# Patient Record
Sex: FEMALE | Race: WHITE | NOT HISPANIC OR LATINO | URBAN - METROPOLITAN AREA
[De-identification: names, ages, dates, MRNs, and addresses within clinical notes are randomized per-mention and may not be internally consistent; named-entity substitution may affect disease eponyms.]

---

## 2017-01-25 ENCOUNTER — INPATIENT (INPATIENT)
Facility: HOSPITAL | Age: 82
LOS: 0 days | Discharge: HOME | End: 2017-01-26
Admitting: INTERNAL MEDICINE

## 2017-06-27 DIAGNOSIS — Z85.3 PERSONAL HISTORY OF MALIGNANT NEOPLASM OF BREAST: ICD-10-CM

## 2017-06-27 DIAGNOSIS — Z88.6 ALLERGY STATUS TO ANALGESIC AGENT: ICD-10-CM

## 2017-06-27 DIAGNOSIS — I95.1 ORTHOSTATIC HYPOTENSION: ICD-10-CM

## 2017-06-27 DIAGNOSIS — Z90.11 ACQUIRED ABSENCE OF RIGHT BREAST AND NIPPLE: ICD-10-CM

## 2017-06-27 DIAGNOSIS — Z86.73 PERSONAL HISTORY OF TRANSIENT ISCHEMIC ATTACK (TIA), AND CEREBRAL INFARCTION WITHOUT RESIDUAL DEFICITS: ICD-10-CM

## 2017-06-27 DIAGNOSIS — I65.22 OCCLUSION AND STENOSIS OF LEFT CAROTID ARTERY: ICD-10-CM

## 2017-06-27 DIAGNOSIS — E11.9 TYPE 2 DIABETES MELLITUS WITHOUT COMPLICATIONS: ICD-10-CM

## 2018-01-07 ENCOUNTER — INPATIENT (INPATIENT)
Facility: HOSPITAL | Age: 83
LOS: 1 days | Discharge: HOME | End: 2018-01-09
Attending: INTERNAL MEDICINE

## 2018-01-07 DIAGNOSIS — R42 DIZZINESS AND GIDDINESS: ICD-10-CM

## 2018-01-07 DIAGNOSIS — N39.0 URINARY TRACT INFECTION, SITE NOT SPECIFIED: ICD-10-CM

## 2018-01-07 DIAGNOSIS — E11.9 TYPE 2 DIABETES MELLITUS WITHOUT COMPLICATIONS: ICD-10-CM

## 2018-01-07 DIAGNOSIS — E78.00 PURE HYPERCHOLESTEROLEMIA, UNSPECIFIED: ICD-10-CM

## 2018-01-07 DIAGNOSIS — I10 ESSENTIAL (PRIMARY) HYPERTENSION: ICD-10-CM

## 2018-01-07 DIAGNOSIS — R55 SYNCOPE AND COLLAPSE: ICD-10-CM

## 2018-01-12 DIAGNOSIS — R42 DIZZINESS AND GIDDINESS: ICD-10-CM

## 2018-01-12 DIAGNOSIS — E11.9 TYPE 2 DIABETES MELLITUS WITHOUT COMPLICATIONS: ICD-10-CM

## 2018-01-12 DIAGNOSIS — S09.90XA UNSPECIFIED INJURY OF HEAD, INITIAL ENCOUNTER: ICD-10-CM
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
## 2018-01-12 DIAGNOSIS — I10 ESSENTIAL (PRIMARY) HYPERTENSION: ICD-10-CM

## 2018-01-12 DIAGNOSIS — H53.8 OTHER VISUAL DISTURBANCES: ICD-10-CM

## 2018-01-12 DIAGNOSIS — H91.8X1 OTHER SPECIFIED HEARING LOSS, RIGHT EAR: ICD-10-CM

## 2018-01-12 DIAGNOSIS — G45.0 VERTEBRO-BASILAR ARTERY SYNDROME: ICD-10-CM

## 2018-01-12 DIAGNOSIS — Z90.11 ACQUIRED ABSENCE OF RIGHT BREAST AND NIPPLE: ICD-10-CM

## 2018-01-12 DIAGNOSIS — E78.5 HYPERLIPIDEMIA, UNSPECIFIED: ICD-10-CM

## 2018-01-12 DIAGNOSIS — I69.398 OTHER SEQUELAE OF CEREBRAL INFARCTION: ICD-10-CM

## 2018-01-12 DIAGNOSIS — R26.81 UNSTEADINESS ON FEET: ICD-10-CM
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
## 2018-01-12 DIAGNOSIS — Z79.84 LONG TERM (CURRENT) USE OF ORAL HYPOGLYCEMIC DRUGS: ICD-10-CM

## 2018-08-30 ENCOUNTER — INPATIENT (INPATIENT)
Facility: HOSPITAL | Age: 83
LOS: 1 days | Discharge: HOME | End: 2018-09-01
Attending: INTERNAL MEDICINE | Admitting: INTERNAL MEDICINE

## 2018-08-30 VITALS
SYSTOLIC BLOOD PRESSURE: 165 MMHG | HEART RATE: 84 BPM | DIASTOLIC BLOOD PRESSURE: 77 MMHG | TEMPERATURE: 97 F | RESPIRATION RATE: 20 BRPM | OXYGEN SATURATION: 99 %

## 2018-08-30 PROBLEM — Z00.00 ENCOUNTER FOR PREVENTIVE HEALTH EXAMINATION: Status: ACTIVE | Noted: 2018-08-30

## 2018-08-30 LAB
ALBUMIN SERPL ELPH-MCNC: 4.4 G/DL — SIGNIFICANT CHANGE UP (ref 3.5–5.2)
ALP SERPL-CCNC: 78 U/L — SIGNIFICANT CHANGE UP (ref 30–115)
ALT FLD-CCNC: 17 U/L — SIGNIFICANT CHANGE UP (ref 0–41)
ANION GAP SERPL CALC-SCNC: 21 MMOL/L — HIGH (ref 7–14)
APPEARANCE UR: ABNORMAL
AST SERPL-CCNC: 27 U/L — SIGNIFICANT CHANGE UP (ref 0–41)
BASE EXCESS BLDV CALC-SCNC: 3.3 MMOL/L — HIGH (ref -2–2)
BASOPHILS # BLD AUTO: 0.04 K/UL — SIGNIFICANT CHANGE UP (ref 0–0.2)
BASOPHILS NFR BLD AUTO: 0.5 % — SIGNIFICANT CHANGE UP (ref 0–1)
BILIRUB SERPL-MCNC: 0.3 MG/DL — SIGNIFICANT CHANGE UP (ref 0.2–1.2)
BILIRUB UR-MCNC: NEGATIVE — SIGNIFICANT CHANGE UP
BUN SERPL-MCNC: 21 MG/DL — HIGH (ref 10–20)
CA-I SERPL-SCNC: 1.28 MMOL/L — SIGNIFICANT CHANGE UP (ref 1.12–1.3)
CALCIUM SERPL-MCNC: 9.5 MG/DL — SIGNIFICANT CHANGE UP (ref 8.5–10.1)
CHLORIDE SERPL-SCNC: 99 MMOL/L — SIGNIFICANT CHANGE UP (ref 98–110)
CO2 SERPL-SCNC: 20 MMOL/L — SIGNIFICANT CHANGE UP (ref 17–32)
COLOR SPEC: YELLOW — SIGNIFICANT CHANGE UP
CREAT SERPL-MCNC: 0.9 MG/DL — SIGNIFICANT CHANGE UP (ref 0.7–1.5)
DIFF PNL FLD: ABNORMAL
EOSINOPHIL # BLD AUTO: 0.06 K/UL — SIGNIFICANT CHANGE UP (ref 0–0.7)
EOSINOPHIL NFR BLD AUTO: 0.8 % — SIGNIFICANT CHANGE UP (ref 0–8)
GAS PNL BLDV: 140 MMOL/L — SIGNIFICANT CHANGE UP (ref 136–145)
GAS PNL BLDV: SIGNIFICANT CHANGE UP
GLUCOSE SERPL-MCNC: 162 MG/DL — HIGH (ref 70–99)
GLUCOSE UR QL: 250 MG/DL
HCO3 BLDV-SCNC: 30 MMOL/L — HIGH (ref 22–29)
HCT VFR BLD CALC: 35 % — LOW (ref 37–47)
HCT VFR BLDA CALC: 38.8 % — SIGNIFICANT CHANGE UP (ref 34–44)
HGB BLD CALC-MCNC: 12.7 G/DL — LOW (ref 14–18)
HGB BLD-MCNC: 11.7 G/DL — LOW (ref 12–16)
IMM GRANULOCYTES NFR BLD AUTO: 0.1 % — SIGNIFICANT CHANGE UP (ref 0.1–0.3)
KETONES UR-MCNC: NEGATIVE — SIGNIFICANT CHANGE UP
LACTATE BLDV-MCNC: 1.6 MMOL/L — SIGNIFICANT CHANGE UP (ref 0.5–1.6)
LEUKOCYTE ESTERASE UR-ACNC: ABNORMAL
LYMPHOCYTES # BLD AUTO: 1.44 K/UL — SIGNIFICANT CHANGE UP (ref 1.2–3.4)
LYMPHOCYTES # BLD AUTO: 18.5 % — LOW (ref 20.5–51.1)
MCHC RBC-ENTMCNC: 28 PG — SIGNIFICANT CHANGE UP (ref 27–31)
MCHC RBC-ENTMCNC: 33.4 G/DL — SIGNIFICANT CHANGE UP (ref 32–37)
MCV RBC AUTO: 83.7 FL — SIGNIFICANT CHANGE UP (ref 81–99)
MONOCYTES # BLD AUTO: 0.65 K/UL — HIGH (ref 0.1–0.6)
MONOCYTES NFR BLD AUTO: 8.3 % — SIGNIFICANT CHANGE UP (ref 1.7–9.3)
NEUTROPHILS # BLD AUTO: 5.59 K/UL — SIGNIFICANT CHANGE UP (ref 1.4–6.5)
NEUTROPHILS NFR BLD AUTO: 71.8 % — SIGNIFICANT CHANGE UP (ref 42.2–75.2)
NITRITE UR-MCNC: NEGATIVE — SIGNIFICANT CHANGE UP
NRBC # BLD: 0 /100 WBCS — SIGNIFICANT CHANGE UP (ref 0–0)
PCO2 BLDV: 51 MMHG — SIGNIFICANT CHANGE UP (ref 41–51)
PH BLDV: 7.37 — SIGNIFICANT CHANGE UP (ref 7.26–7.43)
PH UR: 6 — SIGNIFICANT CHANGE UP (ref 5–8)
PLATELET # BLD AUTO: 215 K/UL — SIGNIFICANT CHANGE UP (ref 130–400)
PO2 BLDV: 40 MMHG — SIGNIFICANT CHANGE UP (ref 20–40)
POTASSIUM BLDV-SCNC: 4.8 MMOL/L — SIGNIFICANT CHANGE UP (ref 3.3–5.6)
POTASSIUM SERPL-MCNC: 4.9 MMOL/L — SIGNIFICANT CHANGE UP (ref 3.5–5)
POTASSIUM SERPL-SCNC: 4.9 MMOL/L — SIGNIFICANT CHANGE UP (ref 3.5–5)
PROT SERPL-MCNC: 7.1 G/DL — SIGNIFICANT CHANGE UP (ref 6–8)
PROT UR-MCNC: 100 MG/DL
RBC # BLD: 4.18 M/UL — LOW (ref 4.2–5.4)
RBC # FLD: 13.3 % — SIGNIFICANT CHANGE UP (ref 11.5–14.5)
RBC CASTS # UR COMP ASSIST: ABNORMAL /HPF
SAO2 % BLDV: 72 % — SIGNIFICANT CHANGE UP
SODIUM SERPL-SCNC: 140 MMOL/L — SIGNIFICANT CHANGE UP (ref 135–146)
SP GR SPEC: 1.02 — SIGNIFICANT CHANGE UP (ref 1.01–1.03)
TROPONIN T SERPL-MCNC: <0.01 NG/ML — SIGNIFICANT CHANGE UP
UROBILINOGEN FLD QL: 0.2 MG/DL — SIGNIFICANT CHANGE UP (ref 0.2–0.2)
WBC # BLD: 7.79 K/UL — SIGNIFICANT CHANGE UP (ref 4.8–10.8)
WBC # FLD AUTO: 7.79 K/UL — SIGNIFICANT CHANGE UP (ref 4.8–10.8)
WBC UR QL: ABNORMAL /HPF

## 2018-08-30 RX ORDER — CEFTRIAXONE 500 MG/1
1 INJECTION, POWDER, FOR SOLUTION INTRAMUSCULAR; INTRAVENOUS EVERY 24 HOURS
Qty: 0 | Refills: 0 | Status: DISCONTINUED | OUTPATIENT
Start: 2018-08-30 | End: 2018-09-01

## 2018-08-30 RX ORDER — SODIUM CHLORIDE 9 MG/ML
500 INJECTION INTRAMUSCULAR; INTRAVENOUS; SUBCUTANEOUS ONCE
Qty: 0 | Refills: 0 | Status: COMPLETED | OUTPATIENT
Start: 2018-08-30 | End: 2018-08-30

## 2018-08-30 RX ORDER — MECLIZINE HCL 12.5 MG
25 TABLET ORAL ONCE
Qty: 0 | Refills: 0 | Status: DISCONTINUED | OUTPATIENT
Start: 2018-08-30 | End: 2018-08-30

## 2018-08-30 RX ADMIN — CEFTRIAXONE 1 GRAM(S): 500 INJECTION, POWDER, FOR SOLUTION INTRAMUSCULAR; INTRAVENOUS at 22:00

## 2018-08-30 RX ADMIN — SODIUM CHLORIDE 500 MILLILITER(S): 9 INJECTION INTRAMUSCULAR; INTRAVENOUS; SUBCUTANEOUS at 20:03

## 2018-08-30 RX ADMIN — CEFTRIAXONE 100 GRAM(S): 500 INJECTION, POWDER, FOR SOLUTION INTRAMUSCULAR; INTRAVENOUS at 21:30

## 2018-08-30 NOTE — ED PROVIDER NOTE - CARE PLAN
Principal Discharge DX:	Near syncope  Secondary Diagnosis:	Dizziness  Secondary Diagnosis:	UTI (urinary tract infection)

## 2018-08-30 NOTE — ED ADULT NURSE NOTE - OBJECTIVE STATEMENT
The patient complains of dizziness while standing for 1 week intermittently.  Patient states she was recently diagnosed with UTI by PCP. Patient denies chest pain and SOB.

## 2018-08-30 NOTE — ED PROVIDER NOTE - PHYSICAL EXAMINATION
PHYSICAL EXAM:    Constitutional: awake, alert, NAD  Eyes: EOMI, no conj injection  HENT: NC AT  Back: no c/t/l spine ttp  Respiratory: no respiratory distress, breath sounds equal b/l, no wheezing, rhonchi or stridor.   Cardiovascular: RRR nml S1S2  Gastrointestinal: soft, no masses, mild suprapubic tenderness. No guarding or rebound.   Extremities: no peripheral edema  Neurological: AAOx3, CN II-XII grossly intact, + LUE weakness 4/5 (old per pt), RUE/LLE/RLE str 5/5, sensation intact throughout.  HINTS  Skin: no rash  Musculoskeletal: no gross deformity

## 2018-08-30 NOTE — ED PROVIDER NOTE - NS ED ROS FT
Constutional: no fever or rigors  Eyes: no eye redness, acute visual change  ENMT: no ear pain, no throat pain  Card: no chest pain, no palpitations  Pulm: no cough, no shortness of breath  GI: suprapubic abdominal pain, no nausea or vomiting  : pos dysuria and hesitency  MSK: no limitation in range of motion, no neck pain  Skin: no rash, no abrasion  Neuro: no numbness, pos chronic weakness to LUE  Heme/Onc: no easy bruising, no bleeding tendency   Allergic: no hives, no throat swelling

## 2018-08-30 NOTE — ED PROVIDER NOTE - OBJECTIVE STATEMENT
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 pt saw pcp (Josefa) and was rx abx which could not be filled. pt feels presyncopal with standing. pt also c/o increasing vertigo. symptoms began 1 week ago when she felt dysuria and since then symptoms have continued to worsen.

## 2018-08-30 NOTE — ED PROVIDER NOTE - MEDICAL DECISION MAKING DETAILS
patient was signed out to me pending CT scans and Neuro evaluation. CT results noted, spoke with neurology, followed their recommendations, patient is admitted to Medicine in stable condition

## 2018-08-30 NOTE — ED PROVIDER NOTE - PROGRESS NOTE DETAILS
I signed out to Dr. Larsen: pending ct angio, neuro consult, admission for pyelo and stroke w/u Dr. Larson called ED back, as recommended Neuro NP is consulted, will evaluate patient

## 2018-08-31 DIAGNOSIS — Z90.10 ACQUIRED ABSENCE OF UNSPECIFIED BREAST AND NIPPLE: Chronic | ICD-10-CM

## 2018-08-31 LAB
ANION GAP SERPL CALC-SCNC: 16 MMOL/L — HIGH (ref 7–14)
BASOPHILS # BLD AUTO: 0.04 K/UL — SIGNIFICANT CHANGE UP (ref 0–0.2)
BASOPHILS NFR BLD AUTO: 0.7 % — SIGNIFICANT CHANGE UP (ref 0–1)
BUN SERPL-MCNC: 15 MG/DL — SIGNIFICANT CHANGE UP (ref 10–20)
CALCIUM SERPL-MCNC: 10.2 MG/DL — HIGH (ref 8.5–10.1)
CHLORIDE SERPL-SCNC: 97 MMOL/L — LOW (ref 98–110)
CK MB CFR SERPL CALC: 2.8 NG/ML — SIGNIFICANT CHANGE UP (ref 0.6–6.3)
CK SERPL-CCNC: 178 U/L — SIGNIFICANT CHANGE UP (ref 0–225)
CO2 SERPL-SCNC: 25 MMOL/L — SIGNIFICANT CHANGE UP (ref 17–32)
CREAT SERPL-MCNC: 0.8 MG/DL — SIGNIFICANT CHANGE UP (ref 0.7–1.5)
EOSINOPHIL # BLD AUTO: 0.05 K/UL — SIGNIFICANT CHANGE UP (ref 0–0.7)
EOSINOPHIL NFR BLD AUTO: 0.8 % — SIGNIFICANT CHANGE UP (ref 0–8)
GLUCOSE BLDC GLUCOMTR-MCNC: 240 MG/DL — HIGH (ref 70–99)
GLUCOSE SERPL-MCNC: 254 MG/DL — HIGH (ref 70–99)
HCT VFR BLD CALC: 35.3 % — LOW (ref 37–47)
HGB BLD-MCNC: 11.6 G/DL — LOW (ref 12–16)
IMM GRANULOCYTES NFR BLD AUTO: 0.2 % — SIGNIFICANT CHANGE UP (ref 0.1–0.3)
LYMPHOCYTES # BLD AUTO: 1.33 K/UL — SIGNIFICANT CHANGE UP (ref 1.2–3.4)
LYMPHOCYTES # BLD AUTO: 22.1 % — SIGNIFICANT CHANGE UP (ref 20.5–51.1)
MAGNESIUM SERPL-MCNC: 1.9 MG/DL — SIGNIFICANT CHANGE UP (ref 1.8–2.4)
MCHC RBC-ENTMCNC: 27.9 PG — SIGNIFICANT CHANGE UP (ref 27–31)
MCHC RBC-ENTMCNC: 32.9 G/DL — SIGNIFICANT CHANGE UP (ref 32–37)
MCV RBC AUTO: 84.9 FL — SIGNIFICANT CHANGE UP (ref 81–99)
MONOCYTES # BLD AUTO: 0.6 K/UL — SIGNIFICANT CHANGE UP (ref 0.1–0.6)
MONOCYTES NFR BLD AUTO: 10 % — HIGH (ref 1.7–9.3)
NEUTROPHILS # BLD AUTO: 3.99 K/UL — SIGNIFICANT CHANGE UP (ref 1.4–6.5)
NEUTROPHILS NFR BLD AUTO: 66.2 % — SIGNIFICANT CHANGE UP (ref 42.2–75.2)
NRBC # BLD: 0 /100 WBCS — SIGNIFICANT CHANGE UP (ref 0–0)
PLATELET # BLD AUTO: 182 K/UL — SIGNIFICANT CHANGE UP (ref 130–400)
POTASSIUM SERPL-MCNC: 5.5 MMOL/L — HIGH (ref 3.5–5)
POTASSIUM SERPL-SCNC: 5.5 MMOL/L — HIGH (ref 3.5–5)
RBC # BLD: 4.16 M/UL — LOW (ref 4.2–5.4)
RBC # FLD: 13.2 % — SIGNIFICANT CHANGE UP (ref 11.5–14.5)
SODIUM SERPL-SCNC: 138 MMOL/L — SIGNIFICANT CHANGE UP (ref 135–146)
TROPONIN T SERPL-MCNC: <0.01 NG/ML — SIGNIFICANT CHANGE UP
WBC # BLD: 6.02 K/UL — SIGNIFICANT CHANGE UP (ref 4.8–10.8)
WBC # FLD AUTO: 6.02 K/UL — SIGNIFICANT CHANGE UP (ref 4.8–10.8)

## 2018-08-31 RX ORDER — CHLORHEXIDINE GLUCONATE 213 G/1000ML
1 SOLUTION TOPICAL
Qty: 0 | Refills: 0 | Status: DISCONTINUED | OUTPATIENT
Start: 2018-08-31 | End: 2018-09-01

## 2018-08-31 RX ORDER — ACETAMINOPHEN 500 MG
975 TABLET ORAL ONCE
Qty: 0 | Refills: 0 | Status: COMPLETED | OUTPATIENT
Start: 2018-08-31 | End: 2018-08-31

## 2018-08-31 RX ORDER — ATORVASTATIN CALCIUM 80 MG/1
40 TABLET, FILM COATED ORAL AT BEDTIME
Qty: 0 | Refills: 0 | Status: DISCONTINUED | OUTPATIENT
Start: 2018-08-31 | End: 2018-09-01

## 2018-08-31 RX ORDER — ENOXAPARIN SODIUM 100 MG/ML
40 INJECTION SUBCUTANEOUS EVERY 24 HOURS
Qty: 0 | Refills: 0 | Status: DISCONTINUED | OUTPATIENT
Start: 2018-08-31 | End: 2018-09-01

## 2018-08-31 RX ORDER — CLOPIDOGREL BISULFATE 75 MG/1
75 TABLET, FILM COATED ORAL DAILY
Qty: 0 | Refills: 0 | Status: DISCONTINUED | OUTPATIENT
Start: 2018-08-31 | End: 2018-09-01

## 2018-08-31 RX ORDER — LISINOPRIL 2.5 MG/1
5 TABLET ORAL DAILY
Qty: 0 | Refills: 0 | Status: DISCONTINUED | OUTPATIENT
Start: 2018-08-31 | End: 2018-09-01

## 2018-08-31 RX ORDER — ASPIRIN/CALCIUM CARB/MAGNESIUM 324 MG
81 TABLET ORAL DAILY
Qty: 0 | Refills: 0 | Status: DISCONTINUED | OUTPATIENT
Start: 2018-08-31 | End: 2018-09-01

## 2018-08-31 RX ADMIN — Medication 975 MILLIGRAM(S): at 02:00

## 2018-08-31 RX ADMIN — LISINOPRIL 5 MILLIGRAM(S): 2.5 TABLET ORAL at 06:22

## 2018-08-31 RX ADMIN — Medication 81 MILLIGRAM(S): at 11:25

## 2018-08-31 RX ADMIN — ATORVASTATIN CALCIUM 40 MILLIGRAM(S): 80 TABLET, FILM COATED ORAL at 21:07

## 2018-08-31 RX ADMIN — ENOXAPARIN SODIUM 40 MILLIGRAM(S): 100 INJECTION SUBCUTANEOUS at 06:22

## 2018-08-31 RX ADMIN — CEFTRIAXONE 100 GRAM(S): 500 INJECTION, POWDER, FOR SOLUTION INTRAMUSCULAR; INTRAVENOUS at 21:06

## 2018-08-31 RX ADMIN — CLOPIDOGREL BISULFATE 75 MILLIGRAM(S): 75 TABLET, FILM COATED ORAL at 11:25

## 2018-08-31 RX ADMIN — CHLORHEXIDINE GLUCONATE 1 APPLICATION(S): 213 SOLUTION TOPICAL at 06:21

## 2018-08-31 NOTE — CHART NOTE - NSCHARTNOTEFT_GEN_A_CORE
spoke with pt- pt is followed by Dr. Jaeger and saw her last week.  Pt requesting Dr. Cabrera for formal neurologic evaluation.  REcommend contacting Dr. Cardona/[facility] service for neurologic evaluation.  Discussed with housestaff.

## 2018-08-31 NOTE — CONSULT NOTE ADULT - ASSESSMENT
Imp; 84 y/o F with chronic dizziness and left side weakness presents with new found UTI with dysuria and continued vertigo in the setting of UTI    Plan; Recommend MRI of brain / MRA too  No acute intervention at this time  Will continue to follow along    Case discussed with Dr SHETH Imp; 82 y/o F with chronic dizziness and left side weakness presents with new found UTI with dysuria and continued vertigo in the setting of UTI    Plan:  No acute intervention at this time  Will continue to follow along

## 2018-08-31 NOTE — H&P ADULT - ATTENDING COMMENTS
#dizziness  orthostatics neg  +horz nystagmus; ?central vertigo from prior cva  pending neuro recs  cont dapt, lipitor

## 2018-08-31 NOTE — CONSULT NOTE ADULT - SUBJECTIVE AND OBJECTIVE BOX
Neurology consult    VAN [id number]    HPI:  82 yo F with PMH of DM, HTN, chronic vertigo, multiple strokes in past w/ residual L-sided weakness presented to ED with worsening dizziness. Dizziness started ~1 week PTP. States it is more a lightheaded sensation than room spinning. occurs when standing up, looking at certain objects. had this for many months since her stroke 6 mos ago. Reports occasional associated headaches. Denies chest pain, palpitations, vision changes, nausea, vomiting, or any falls or LOC. States symptoms started at same time she began having urinary symptoms (burning sensation). Has had frequent UTI's in past. Was told by her PCP she has a UTI and was given prescription of Abx.       MEDICATIONS    aspirin  chewable 81 milliGRAM(s) Oral daily  atorvastatin 40 milliGRAM(s) Oral at bedtime  cefTRIAXone   IVPB 1 Gram(s) IV Intermittent every 24 hours  chlorhexidine 4% Liquid 1 Application(s) Topical <User Schedule>  clopidogrel Tablet 75 milliGRAM(s) Oral daily  enoxaparin Injectable 40 milliGRAM(s) SubCutaneous every 24 hours  lisinopril 5 milliGRAM(s) Oral daily         Family history: No history of dementia, strokes, or seizures   FAMILY HISTORY:  No pertinent family history in first degree relatives    SOCIAL HISTORY -- No history of tobacco or alcohol use     Allergies    codeine (Hives; Urticaria)    Intolerances        Height (cm): 157.48 ( @ 04:44)  Weight (kg): 53.2 ( @ 04:44)  BMI (kg/m2): 21.5 ( @ 04:44)    Vital Signs Last 24 Hrs  T(C): 36.3 (31 Aug 2018 13:35), Max: 37.6 (30 Aug 2018 22:58)  T(F): 97.3 (31 Aug 2018 13:35), Max: 99.6 (30 Aug 2018 22:58)  HR: 73 (31 Aug 2018 13:35) (52 - 84)  BP: 117/55 (31 Aug 2018 13:35) (117/55 - 165/77)  BP(mean): --  RR: 18 (31 Aug 2018 13:35) (18 - 20)  SpO2: 99% (30 Aug 2018 22:58) (99% - 99%)      PHYSICAL EXAM:  General:  NAD. Neck supple. No carotid bruit. No extremity edema.  Neurological:  Mental status: awake, alert, oriented x3  Speech: fluent, no dysarthria  CN: II-XII intact, no nystagmus, no facial droop  Motor: muscle tone normal, muslce power 5/5 in all R extremities proximally and distally, L-hemiparesis 4/5  DTRs: 2+ throughuot, plantar - toes downgoig on R, Upgoing on L  Sensory: intact to light touch and pinprick  Cerebellar: normal F/N and H/N b/l  Gait:  ataxic, hemiparetic      LABS:  CBC Full  -  ( 31 Aug 2018 12:24 )  WBC Count : 6.02 K/uL  Hemoglobin : 11.6 g/dL  Hematocrit : 35.3 %  Platelet Count - Automated : 182 K/uL  Mean Cell Volume : 84.9 fL  Mean Cell Hemoglobin : 27.9 pg  Mean Cell Hemoglobin Concentration : 32.9 g/dL  Auto Neutrophil # : 3.99 K/uL  Auto Lymphocyte # : 1.33 K/uL  Auto Monocyte # : 0.60 K/uL  Auto Eosinophil # : 0.05 K/uL  Auto Basophil # : 0.04 K/uL  Auto Neutrophil % : 66.2 %  Auto Lymphocyte % : 22.1 %  Auto Monocyte % : 10.0 %  Auto Eosinophil % : 0.8 %  Auto Basophil % : 0.7 %    Urinalysis Basic - ( 30 Aug 2018 19:03 )    Color: Yellow / Appearance: Cloudy / S.020 / pH: x  Gluc: x / Ketone: Negative  / Bili: Negative / Urobili: 0.2 mg/dL   Blood: x / Protein: 100 mg/dL / Nitrite: Negative   Leuk Esterase: Large / RBC: 25-50 /HPF / WBC 26-50 /HPF   Sq Epi: x / Non Sq Epi: x / Bacteria: x      -    138  |  97<L>  |  15  ----------------------------<  254<H>  5.5<H>   |  25  |  0.8    Ca    10.2<H>      31 Aug 2018 12:24  Mg     1.9         TPro  7.1  /  Alb  4.4  /  TBili  0.3  /  DBili  x   /  AST  27  /  ALT  17  /  AlkPhos  78      Hemoglobin A1C:     LIVER FUNCTIONS - ( 30 Aug 2018 19:03 )  Alb: 4.4 g/dL / Pro: 7.1 g/dL / ALK PHOS: 78 U/L / ALT: 17 U/L / AST: 27 U/L / GGT: x                 RADIOLOGY:  Head CT:  1.  Stable chronic infarct in the right occipital lobe.  2.  Stable chronic microvascular ischemic changes.  3.  No CT evidence of acute intracranial abnormality.    CTA Neck:  CTA neck: Mild atherosclerosis at the carotid bifurcations with mild   (less than 50%) focal stenosis.    CTA Head: pending        Impression:   82 yo F with PMH of DM, HTN, chronic vertigo, multiple strokes in past w/ residual L-sided weakness presented to ED with worsening dizziness most likely 2 to    -Peripheral vs Central Vestibulopathy.  -S/P Stroke with L-HP  -UTI  -H/o DM, HTN    Plan:  -Continue present management  -F/U CTA Head report  -Vestibular Tx as outpt  -F/U Neuro as outpt  -Neurologicaly stable  -Case was discussed with Dr. Hassan.

## 2018-08-31 NOTE — CONSULT NOTE ADULT - ASSESSMENT
IMPRESSION: Rehab of gait abnormality secondary to UTI    PRECAUTIONS: [ x ] Cardiac  [  ] Respiratory  [  ] Seizures [  ] Contact Isolation  [  ] Droplet Isolation  [  ] Other    Weight Bearing Status:     RECOMMENDATION:    Out of Bed to Chair     DVT/Decubiti Prophylaxis    REHAB PLAN:     [ xx  ] Bedside P/T 3-5 times a week   [   ]   Bedside O/T  2-3 times a week             [   ] No Rehab Therapy Indicated                   [   ]  Speech Therapy   Conditioning/ROM                                    ADL  Bed Mobility                                               Conditioning/ROM  Transfers                                                     Bed Mobility  Sitting /Standing Balance                         Transfers                                        Gait Training                                               Sitting/Standing Balance  Stair Training [   ]Applicable                    Home equipment Eval                                                                        Splinting  [   ] Only      GOALS:   ADL   [ x  ]   Independent                    Transfers  [ x  ] Independent                          Ambulation  [ x  ] Independent     [ x   ] With device                            [   ]  CG                                                         [   ]  CG                                                                  [   ] CG                            [    ] Min A                                                   [   ] Min A                                                              [   ] Min  A          DISCHARGE PLAN:   [   ]  Good candidate for Intensive Rehabilitation/Hospital based-[facility]                                             Will tolerate 3hrs Intensive Rehab Daily                                       [    ]  Short Term Rehab in Skilled Nursing Facility                                       [xx    ]  Home with Outpatient or VN services                                         [    ]  Possible Candidate for Intensive Hospital based Rehab

## 2018-08-31 NOTE — H&P ADULT - NSHPPHYSICALEXAM_GEN_ALL_CORE
GEN: NAD, comfortable, elderly  CARDIO: RRR, no m/r/g  RESP: CTAB, no w/r/r  ABD: soft, non-distended, +bs, mild tenderness to palpation lower > upper abdomen  EXT: no edema  NEURO: AAOx3, LUE + LLE strength 3-4/5, RUE + RLE 5/5, no facial asymmetry, moderate dysmetria noted LUE GEN: NAD, comfortable, elderly  CARDIO: RRR, no m/r/g  RESP: CTAB, no w/r/r  ABD: soft, non-distended, +bs, mild tenderness to palpation lower > upper abdomen  EXT: no edema  NEURO: AAOx3, LUE + LLE strength 3-4/5, RUE + RLE 5/5, moderate dysmetria noted LUE, no facial asymmetry GEN: NAD, comfortable, elderly  CARDIO: RRR, no m/r/g  RESP: CTAB, no w/r/r  ABD: soft, non-distended, +bs, mild tenderness to palpation lower > upper abdomen  EXT: no edema  NEURO: AAOx3, LUE + LLE strength 3-4/5, RUE + RLE 5/5, moderate dysmetria noted LUE, no facial asymmetry, horizontal nystagmus  skin: no ulcers, rashes  back: nl rom  psych: nl affect

## 2018-08-31 NOTE — H&P ADULT - PMH
Breast cancer    CVA (cerebral vascular accident)    DM (diabetes mellitus)    HTN (hypertension)    Vertigo

## 2018-08-31 NOTE — CONSULT NOTE ADULT - SUBJECTIVE AND OBJECTIVE BOX
HPI:  82 yo F with PMH of DM, HTN, chronic vertigo, multiple strokes in past w/ residual L-sided weakness presented to ED with worsening dizziness. Dizziness started ~1 week PTP. States it is more a lightheaded sensation than room spinning. occurs when standing up, looking at certain objects. had this for many months since her stroke 6 mos ago. Reports occasional associated headaches. Denies chest pain, palpitations, vision changes, nausea, vomiting, or any falls or LOC. States symptoms started at same time she began having urinary symptoms (burning sensation). Has had frequent UTI's in past. Was told by her PCP she has a UTI and was given prescription of Abx. Patient had issue filling prescription (wrong number of pills sent?). Before mistake could be corrected patient came in because symptoms became so bad. Denies any fever or chills at home. At baseline patient ambulates with cane. Has no steps at home though does not believe she could do them if she had to. (31 Aug 2018 03:53)      PAST MEDICAL & SURGICAL HISTORY:  Breast cancer  CVA (cerebral vascular accident)  Vertigo  HTN (hypertension)  DM (diabetes mellitus)  H/O mastectomy      Hospital Course:  She has urinary frequency and dysuria. she is deconditioned and weak. She denies vertigo c/o.  TODAY'S SUBJECTIVE & REVIEW OF SYMPTOMS:     Constitutional WNL   Cardio WNL   Resp WNL   GI WNL  Heme WNL  Endo WNL  Skin WNL  MSK WNL  Neuro WNL  Cognitive WNL  Psych WNL      MEDICATIONS  (STANDING):  aspirin  chewable 81 milliGRAM(s) Oral daily  atorvastatin 40 milliGRAM(s) Oral at bedtime  cefTRIAXone   IVPB 1 Gram(s) IV Intermittent every 24 hours  chlorhexidine 4% Liquid 1 Application(s) Topical <User Schedule>  clopidogrel Tablet 75 milliGRAM(s) Oral daily  enoxaparin Injectable 40 milliGRAM(s) SubCutaneous every 24 hours  lisinopril 5 milliGRAM(s) Oral daily    MEDICATIONS  (PRN):      FAMILY HISTORY:  No pertinent family history in first degree relatives      Allergies    codeine (Hives; Urticaria)    Intolerances        SOCIAL HISTORY:    [  ] Etoh  [  ] Smoking  [  ] Substance abuse     Home Environment:  [ x ] Home Alone  [  ] Lives with Family  [  ] Home Health Aid    Dwelling:  [  ] Apartment  [  ] Private House  [  ] Adult Home  [  ] Skilled Nursing Facility      [  ] Short Term  [  ] Long Term  [  ] Stairs       Elevator [  ]    FUNCTIONAL STATUS PTA: (Check all that apply)  Ambulation: [ x  ]Independent    [  ] Dependent     [  ] Non-Ambulatory  Assistive Device: [  ] SA Cane  [  ]  Q Cane  [ x ] Walker  [  ]  Wheelchair  ADL : [  ] Independent  [  ]  Dependent       Vital Signs Last 24 Hrs  T(C): 36.3 (31 Aug 2018 13:35), Max: 37.6 (30 Aug 2018 22:58)  T(F): 97.3 (31 Aug 2018 13:35), Max: 99.6 (30 Aug 2018 22:58)  HR: 73 (31 Aug 2018 13:35) (52 - 84)  BP: 117/55 (31 Aug 2018 13:35) (117/55 - 165/77)  BP(mean): --  RR: 18 (31 Aug 2018 13:35) (18 - 20)  SpO2: 99% (30 Aug 2018 22:58) (99% - 99%)      PHYSICAL EXAM: Alert & Oriented X3  GENERAL: NAD, well-groomed, well-developed  HEAD:  Atraumatic, Normocephalic  EYES: EOMI, PERRLA, conjunctiva and sclera clear  NECK: Supple, No JVD, Normal thyroid  CHEST/LUNG: Clear to percussion bilaterally; No rales, rhonchi, wheezing, or rubs  HEART: Regular rate and rhythm; No murmurs, rubs, or gallops  ABDOMEN: Soft, Nontender, Nondistended; Bowel sounds present  EXTREMITIES:  2+ Peripheral Pulses, No clubbing, cyanosis, or edema    NERVOUS SYSTEM:  Cranial Nerves 2-12 intact [  ] Abnormal  [  ]  ROM: WFL all extremities [  ]  Abnormal [  ]  Motor Strength: WFL all extremities  [  ]  Abnormal [ x ]5-/5 motor power  Sensation: intact to light touch [x  ] Abnormal [  ]  Reflexes: Symmetric [  ]  Abnormal [  ]    FUNCTIONAL STATUS:  Bed Mobility: Independent [  ]  Supervision [  ]  Needs Assistance [  ]  N/A [  ]  Transfers: Independent [  ]  Supervision [  ]  Needs Assistance [  ]  N/A [  ]   Ambulation: Independent [  ]  Supervision [  ]  Needs Assistance [  ]  N/A [  ]  ADL: Independent [  ] Requires Assistance [  ] N/A [  ]      LABS:                        11.6   6.02  )-----------( 182      ( 31 Aug 2018 12:24 )             35.3         138  |  97<L>  |  15  ----------------------------<  254<H>  5.5<H>   |  25  |  0.8    Ca    10.2<H>      31 Aug 2018 12:24  Mg     1.9         TPro  7.1  /  Alb  4.4  /  TBili  0.3  /  DBili  x   /  AST  27  /  ALT  17  /  AlkPhos  78        Urinalysis Basic - ( 30 Aug 2018 19:03 )    Color: Yellow / Appearance: Cloudy / S.020 / pH: x  Gluc: x / Ketone: Negative  / Bili: Negative / Urobili: 0.2 mg/dL   Blood: x / Protein: 100 mg/dL / Nitrite: Negative   Leuk Esterase: Large / RBC: 25-50 /HPF / WBC 26-50 /HPF   Sq Epi: x / Non Sq Epi: x / Bacteria: x        RADIOLOGY & ADDITIONAL STUDIES:    Assesment:

## 2018-08-31 NOTE — H&P ADULT - ASSESSMENT
82 yo F with PMH of DM, HTN, chronic vertigo, multiple strokes in past w/ residual L-sided weakness presented to ED with worsening dizziness. UA +ve with symptoms. In ED CT head + CTA head/neck was negative, CT abd/pel also negative. Seen by Neuro who recommended patient get MRI.    #) Vertigo  - has history of chronic vertigo but currently worsening  - possibly 2/2 to UTI and autonomic dysreflexia?  - reports worsening symptoms with Meclizine  - Neuro following - Rx MRI + MRA brain. Patient is adamantly refusing and states she will not be able to tolerate MRI. Offered sedation. Still refusing. Will hold off on ordering for now    #) Cystitis, uncomplicated  - UA +ve, patient reports burning + abdominal discomfort. No fevers  - f/u Urine Cx  - c/w Rocephin 1g q24 for now    #) DM - holding home Glimepiride, monitor fs, insulin PRN    #) HTN - c/w ACE    #) hx of CVA - c/w DAPT + statin    DVT ppx: Lovenox subQ  Diet: DASH, carb consistent  Activity: OOBTC  Code status: FULL  Dispo: anticipate home

## 2018-08-31 NOTE — CONSULT NOTE ADULT - SUBJECTIVE AND OBJECTIVE BOX
Neurology Consult    CC: 83y old F c/o Vertigo    HPI:      PAST MEDICAL & SURGICAL HISTORY:      FAMILY HISTORY:      Social History: (-) x 3    Allergies    codeine (Hives; Urticaria)    Intolerances        MEDICATIONS  (STANDING):  cefTRIAXone   IVPB 1 Gram(s) IV Intermittent every 24 hours    MEDICATIONS  (PRN):      Review of systems:    Constitutional: No fever, weight loss or fatigue    Eyes: No eye pain or discharge  ENMT:  No difficulty hearing; No sinus or throat pain  Neck: No pain or stiffness  Respiratory: No cough, wheezing, chills or hemoptysis  Cardiovascular: No chest pain, palpitations, shortness of breath, dyspnea on exertion  Gastrointestinal: No abdominal pain, nausea, vomiting or hematemesis; No diarrhea or constipation.   Genitourinary: No dysuria, frequency, hematuria or incontinence  Neurological: As per HPI  Skin: No rashes or lesions   Endocrine: No heat or cold intolerance; No hair loss  Musculoskeletal: No joint pain or swelling  Psychiatric: No depression, anxiety, mood swings  Heme/Lymph: No easy bruising or bleeding gums    Vital Signs Last 24 Hrs  T(C): 37.6 (30 Aug 2018 22:58), Max: 37.6 (30 Aug 2018 22:58)  T(F): 99.6 (30 Aug 2018 22:58), Max: 99.6 (30 Aug 2018 22:58)  HR: 52 (30 Aug 2018 22:58) (52 - 84)  BP: 155/62 (30 Aug 2018 22:58) (155/62 - 165/77)  BP(mean): --  RR: 20 (30 Aug 2018 22:58) (20 - 20)  SpO2: 99% (30 Aug 2018 22:58) (99% - 99%)    Neurologic Examination:  General:  Appearance is consistent with chronologic age.  No abnormal facies.   General: The patient is oriented to person, place, time and date.  Recent and remote memory intact.  Fund of knowledge is intact and normal.  Language with normal repetition, comprehension and naming.  Nondysarthric.    Cranial nerves: intact VA, VFF.  EOMI w/o nystagmus, skew or reported double vision.  PERRL.  No ptosis/weakness of eyelid closure.  Facial sensation is normal with normal bite.  No facial asymmetry.  Hearing grossly intact b/l.  Palate elevates midline.  Tongue midline.  Motor examination:   Normal tone, bulk and range of motion.  No tenderness, twitching, tremors or involuntary movements.  Formal Muscle Strength Testing: (MRC grade R/L) 5/5 UE; 5/5 LE.  No observable drift.  Reflexes:   2+ b/l pectoralis, biceps, triceps, brachioradialis, patella and Achilles.  Plantar response downgoing b/l.  Jaw jerk, Vidhya, clonus absent.  Sensory examination:   Intact to light touch and pinprick, pain, temperature and proprioception and vibration in all extremities.  Cerebellum:   FTN/HKS intact with normal KATARZYNA in all limbs.  No dysmetria or dysdiadokinesia.  Gait narrow based and normal.    Labs:   CBC Full  -  ( 30 Aug 2018 19:03 )  WBC Count : 7.79 K/uL  Hemoglobin : 11.7 g/dL  Hematocrit : 35.0 %  Platelet Count - Automated : 215 K/uL  Mean Cell Volume : 83.7 fL  Mean Cell Hemoglobin : 28.0 pg  Mean Cell Hemoglobin Concentration : 33.4 g/dL  Auto Neutrophil # : 5.59 K/uL  Auto Lymphocyte # : 1.44 K/uL  Auto Monocyte # : 0.65 K/uL  Auto Eosinophil # : 0.06 K/uL  Auto Basophil # : 0.04 K/uL  Auto Neutrophil % : 71.8 %  Auto Lymphocyte % : 18.5 %  Auto Monocyte % : 8.3 %  Auto Eosinophil % : 0.8 %  Auto Basophil % : 0.5 %    08-30    140  |  99  |  21<H>  ----------------------------<  162<H>  4.9   |  20  |  0.9    Ca    9.5      30 Aug 2018 19:03    TPro  7.1  /  Alb  4.4  /  TBili  0.3  /  DBili  x   /  AST  27  /  ALT  17  /  AlkPhos  78  08-30    LIVER FUNCTIONS - ( 30 Aug 2018 19:03 )  Alb: 4.4 g/dL / Pro: 7.1 g/dL / ALK PHOS: 78 U/L / ALT: 17 U/L / AST: 27 U/L / GGT: x             Urinalysis Basic - ( 30 Aug 2018 19:03 )    Color: Yellow / Appearance: Cloudy / S.020 / pH: x  Gluc: x / Ketone: Negative  / Bili: Negative / Urobili: 0.2 mg/dL   Blood: x / Protein: 100 mg/dL / Nitrite: Negative   Leuk Esterase: Large / RBC: 25-50 /HPF / WBC 26-50 /HPF   Sq Epi: x / Non Sq Epi: x / Bacteria: x          Neuroimaging:  NCHCT: CT Head No Cont:   EXAM:  CT BRAIN            PROCEDURE DATE:  2018            INTERPRETATION:  Clinical History / Reason for exam: Vertigo    Technique: Multiple contiguous axial CT images of the head were obtained    from the base of the skull to the vertex without administration of   intravenous contrast. Coronal and sagittal reformats were obtained.    Comparison: CT head dated 3/7/2018    Findings:    The cortical sulci and ventricular system are appropriate for patient's   stated age.     Confluent periventricular white matter disease consistent with chronic   microvascular ischemic changes.    Stable hypoattenuation in the right occipital lobe representing chronic   infarct.    There is no acute mass effect, midline shift or intracranial hemorrhage.      The calvarium is intact.    The visualized paranasal sinuses and bilateral mastoid complexes are   unremarkable. The globes and orbits are unremarkable.    Impression:     1.  Stable chronic infarct in the right occipital lobe.    2.  Stable chronic microvascular ischemic changes.    3.  No CT evidence of acute intracranial abnormality.              QUINTON LOREDO M.D., RESIDENT RADIOLOGIST  This document has been electronically signed.  MATT SANCHEZ M.D., ATTENDING RADIOLOGIST  This document has been electronically signed. 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 pt saw pcp (Josefa) and was rx abx which could not be filled. pt feels presyncopal with standing. pt also c/o increasing vertigo. Symptoms began 1 week ago when she felt dysuria and since then symptoms have continued to worsen.          PAST MEDICAL & SURGICAL HISTORY:      FAMILY HISTORY:      Social History: (-) x 3    Allergies    codeine (Hives; Urticaria)    Intolerances        MEDICATIONS  (STANDING):  cefTRIAXone   IVPB 1 Gram(s) IV Intermittent every 24 hours    MEDICATIONS  (PRN):      Review of systems:    Constitutional: No fever, weight loss or fatigue    Eyes: No eye pain or discharge  ENMT:  No difficulty hearing; No sinus or throat pain  Neck: No pain or stiffness  Respiratory: No cough, wheezing, chills or hemoptysis  Cardiovascular: No chest pain, palpitations, shortness of breath, dyspnea on exertion  Gastrointestinal: No abdominal pain, nausea, vomiting or hematemesis; No diarrhea or constipation.   Genitourinary: No dysuria, frequency, hematuria or incontinence  Neurological: As per HPI  Skin: No rashes or lesions   Endocrine: No heat or cold intolerance; No hair loss  Musculoskeletal: No joint pain or swelling  Psychiatric: No depression, anxiety, mood swings  Heme/Lymph: No easy bruising or bleeding gums    Vital Signs Last 24 Hrs  T(C): 37.6 (30 Aug 2018 22:58), Max: 37.6 (30 Aug 2018 22:58)  T(F): 99.6 (30 Aug 2018 22:58), Max: 99.6 (30 Aug 2018 22:58)  HR: 52 (30 Aug 2018 22:58) (52 - 84)  BP: 155/62 (30 Aug 2018 22:58) (155/62 - 165/77)  BP(mean): --  RR: 20 (30 Aug 2018 22:58) (20 - 20)  SpO2: 99% (30 Aug 2018 22:58) (99% - 99%)    Neurologic Examination:  General:  Appearance is consistent with chronologic age.  No abnormal facies.   General: The patient is oriented to person, place, time and date.  Recent and remote memory intact.  Fund of knowledge is intact and normal.  Language with normal repetition, comprehension and naming.  Nondysarthric.    Cranial nerves: intact VA, VFF.  EOMI w/o nystagmus, skew or reported double vision.  PERRL.  No ptosis/weakness of eyelid closure.  Facial sensation is normal with normal bite.  No facial asymmetry.  Hearing grossly intact b/l.  Palate elevates midline.  Tongue midline.  Motor examination:   Normal tone, bulk and range of motion.  No tenderness, twitching, tremors or involuntary movements.  Formal Muscle Strength Testing: (MRC grade R/L) 5/5 UE; 5/5 LE.  No observable drift.  Reflexes:   2+ b/l pectoralis, biceps, triceps, brachioradialis, patella and Achilles.  Plantar response downgoing b/l.  Jaw jerk, Vidhya, clonus absent.  Sensory examination:   Intact to light touch and pinprick, pain, temperature and proprioception and vibration in all extremities.  Cerebellum:   FTN/HKS intact with normal KATARZYNA in all limbs.  No dysmetria or dysdiadokinesia.  Gait narrow based and normal.    Labs:   CBC Full  -  ( 30 Aug 2018 19:03 )  WBC Count : 7.79 K/uL  Hemoglobin : 11.7 g/dL  Hematocrit : 35.0 %  Platelet Count - Automated : 215 K/uL  Mean Cell Volume : 83.7 fL  Mean Cell Hemoglobin : 28.0 pg  Mean Cell Hemoglobin Concentration : 33.4 g/dL  Auto Neutrophil # : 5.59 K/uL  Auto Lymphocyte # : 1.44 K/uL  Auto Monocyte # : 0.65 K/uL  Auto Eosinophil # : 0.06 K/uL  Auto Basophil # : 0.04 K/uL  Auto Neutrophil % : 71.8 %  Auto Lymphocyte % : 18.5 %  Auto Monocyte % : 8.3 %  Auto Eosinophil % : 0.8 %  Auto Basophil % : 0.5 %        140  |  99  |  21<H>  ----------------------------<  162<H>  4.9   |  20  |  0.9    Ca    9.5      30 Aug 2018 19:03    TPro  7.1  /  Alb  4.4  /  TBili  0.3  /  DBili  x   /  AST  27  /  ALT  17  /  AlkPhos  78      LIVER FUNCTIONS - ( 30 Aug 2018 19:03 )  Alb: 4.4 g/dL / Pro: 7.1 g/dL / ALK PHOS: 78 U/L / ALT: 17 U/L / AST: 27 U/L / GGT: x             Urinalysis Basic - ( 30 Aug 2018 19:03 )    Color: Yellow / Appearance: Cloudy / S.020 / pH: x  Gluc: x / Ketone: Negative  / Bili: Negative / Urobili: 0.2 mg/dL   Blood: x / Protein: 100 mg/dL / Nitrite: Negative   Leuk Esterase: Large / RBC: 25-50 /HPF / WBC 26-50 /HPF   Sq Epi: x / Non Sq Epi: x / Bacteria: x          Neuroimaging:  NCHCT: CT Head No Cont:   EXAM:  CT BRAIN            PROCEDURE DATE:  2018            INTERPRETATION:  Clinical History / Reason for exam: Vertigo    Technique: Multiple contiguous axial CT images of the head were obtained    from the base of the skull to the vertex without administration of   intravenous contrast. Coronal and sagittal reformats were obtained.    Comparison: CT head dated 3/7/2018    Findings:    The cortical sulci and ventricular system are appropriate for patient's   stated age.     Confluent periventricular white matter disease consistent with chronic   microvascular ischemic changes.    Stable hypoattenuation in the right occipital lobe representing chronic   infarct.    There is no acute mass effect, midline shift or intracranial hemorrhage.      The calvarium is intact.    The visualized paranasal sinuses and bilateral mastoid complexes are   unremarkable. The globes and orbits are unremarkable.    Impression:     1.  Stable chronic infarct in the right occipital lobe.    2.  Stable chronic microvascular ischemic changes.    3.  No CT evidence of acute intracranial abnormality.              QUINTON LOREDO M.D., RESIDENT RADIOLOGIST  This document has been electronically signed.  MATT SANCHEZ M.D., ATTENDING RADIOLOGIST  This document has been electronically signed. 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 Pt saw pcp (Josefa) and was rx abx which could not be filled. pt feels presyncopal with standing. Pt also c/o increasing vertigo. Symptoms began 1 week ago when she felt dysuria and has base line vertigo since her previous stroke 3 yrs ago. Pt states she completely independant  and ambulates with a cane. HCT shows chronic occipital infarct small r cerebellar wedge infarct. Pt + for UTI started on ABX at this ED admission. 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           PAST MEDICAL & SURGICAL HISTORY:      FAMILY HISTORY:      Social History: (-) x 3    Allergies    codeine (Hives; Urticaria)    Intolerances        MEDICATIONS  (STANDING):  cefTRIAXone   IVPB 1 Gram(s) IV Intermittent every 24 hours    MEDICATIONS  (PRN):      Review of systems:    Constitutional: No fever, weight loss or fatigue    Eyes: No eye pain or discharge  ENMT:  No difficulty hearing; No sinus or throat pain  Neck: No pain or stiffness  Respiratory: No cough, wheezing, chills or hemoptysis  Cardiovascular: No chest pain, palpitations, shortness of breath, dyspnea on exertion  Gastrointestinal: No abdominal pain, nausea, vomiting or hematemesis; No diarrhea or constipation.   Genitourinary: No dysuria, frequency, hematuria or incontinence  Neurological: As per HPI  Skin: No rashes or lesions   Endocrine: No heat or cold intolerance; No hair loss  Musculoskeletal: No joint pain or swelling  Psychiatric: No depression, anxiety, mood swings  Heme/Lymph: No easy bruising or bleeding gums    Vital Signs Last 24 Hrs  T(C): 37.6 (30 Aug 2018 22:58), Max: 37.6 (30 Aug 2018 22:58)  T(F): 99.6 (30 Aug 2018 22:58), Max: 99.6 (30 Aug 2018 22:58)  HR: 52 (30 Aug 2018 22:58) (52 - 84)  BP: 155/62 (30 Aug 2018 22:58) (155/62 - 165/77)  BP(mean): --  RR: 20 (30 Aug 2018 22:58) (20 - 20)  SpO2: 99% (30 Aug 2018 22:58) (99% - 99%)    Neurologic Examination:  General:  Appearance is consistent with chronologic age.  No abnormal facies.   Alert and Orientedx 3  PEERLA EOMI  No facial Noted Tongue Midline  Speech Intact   Motor: RUE 5/5 LUE 3+/5   LLE 4/5 RLE 5/5  + Left side drift  Sensory: grossly intact to light touch  Cerebellar; FTN = Dysmetria on Left (past pointing)  Gait Not tested     Labs:   CBC Full  -  ( 30 Aug 2018 19:03 )  WBC Count : 7.79 K/uL  Hemoglobin : 11.7 g/dL  Hematocrit : 35.0 %  Platelet Count - Automated : 215 K/uL  Mean Cell Volume : 83.7 fL  Mean Cell Hemoglobin : 28.0 pg  Mean Cell Hemoglobin Concentration : 33.4 g/dL  Auto Neutrophil # : 5.59 K/uL  Auto Lymphocyte # : 1.44 K/uL  Auto Monocyte # : 0.65 K/uL  Auto Eosinophil # : 0.06 K/uL  Auto Basophil # : 0.04 K/uL  Auto Neutrophil % : 71.8 %  Auto Lymphocyte % : 18.5 %  Auto Monocyte % : 8.3 %  Auto Eosinophil % : 0.8 %  Auto Basophil % : 0.5 %        140  |  99  |  21<H>  ----------------------------<  162<H>  4.9   |  20  |  0.9    Ca    9.5      30 Aug 2018 19:03    TPro  7.1  /  Alb  4.4  /  TBili  0.3  /  DBili  x   /  AST  27  /  ALT  17  /  AlkPhos  78  08-30    LIVER FUNCTIONS - ( 30 Aug 2018 19:03 )  Alb: 4.4 g/dL / Pro: 7.1 g/dL / ALK PHOS: 78 U/L / ALT: 17 U/L / AST: 27 U/L / GGT: x             Urinalysis Basic - ( 30 Aug 2018 19:03 )    Color: Yellow / Appearance: Cloudy / S.020 / pH: x  Gluc: x / Ketone: Negative  / Bili: Negative / Urobili: 0.2 mg/dL   Blood: x / Protein: 100 mg/dL / Nitrite: Negative   Leuk Esterase: Large / RBC: 25-50 /HPF / WBC 26-50 /HPF   Sq Epi: x / Non Sq Epi: x / Bacteria: x          Neuroimaging:  NCHCT: CT Head No Cont:   EXAM:  CT BRAIN            PROCEDURE DATE:  2018            INTERPRETATION:  Clinical History / Reason for exam: Vertigo    Technique: Multiple contiguous axial CT images of the head were obtained    from the base of the skull to the vertex without administration of   intravenous contrast. Coronal and sagittal reformats were obtained.    Comparison: CT head dated 3/7/2018    Findings:    The cortical sulci and ventricular system are appropriate for patient's   stated age.     Confluent periventricular white matter disease consistent with chronic   microvascular ischemic changes.    Stable hypoattenuation in the right occipital lobe representing chronic   infarct.    There is no acute mass effect, midline shift or intracranial hemorrhage.      The calvarium is intact.    The visualized paranasal sinuses and bilateral mastoid complexes are   unremarkable. The globes and orbits are unremarkable.    Impression:     1.  Stable chronic infarct in the right occipital lobe.    2.  Stable chronic microvascular ischemic changes.    3.  No CT evidence of acute intracranial abnormality.              QUINTON LOREDO M.D., RESIDENT RADIOLOGIST  This document has been electronically signed.  MATT SANCHEZ M.D., ATTENDING RADIOLOGIST  This document has been electronically signed. Aug 30 2018  8:37PM             (18 @ 20:22)    CT Angiography/Perfusion: Milds 50% stenosis of b/l internal carotids arteries. 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 Pt saw pcp (Josefa) and was rx abx which could not be filled. pt feels presyncopal with standing. Pt also c/o increasing vertigo. Symptoms began 1 week ago when she felt dysuria and has base line vertigo since her previous stroke 3 yrs ago. Pt states she completely independant  and ambulates with a cane. HCT shows chronic occipital infarct small r cerebellar wedge infarct. Pt + for UTI started on ABX at this ED admission. 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           PAST MEDICAL & SURGICAL HISTORY:      FAMILY HISTORY:      Social History: (-) x 3    Allergies    codeine (Hives; Urticaria)    Intolerances        MEDICATIONS  (STANDING):  cefTRIAXone   IVPB 1 Gram(s) IV Intermittent every 24 hours    MEDICATIONS  (PRN):      Review of systems:    Constitutional: No fever, weight loss or fatigue    Eyes: No eye pain or discharge  ENMT:  No difficulty hearing; No sinus or throat pain  Neck: No pain or stiffness  Respiratory: No cough, wheezing, chills or hemoptysis  Cardiovascular: No chest pain, palpitations, shortness of breath, dyspnea on exertion  Gastrointestinal: No abdominal pain, nausea, vomiting or hematemesis; No diarrhea or constipation.   Genitourinary: No dysuria, frequency, hematuria or incontinence  Neurological: As per HPI  Skin: No rashes or lesions   Endocrine: No heat or cold intolerance; No hair loss  Musculoskeletal: No joint pain or swelling  Psychiatric: No depression, anxiety, mood swings  Heme/Lymph: No easy bruising or bleeding gums    Vital Signs Last 24 Hrs  T(C): 37.6 (30 Aug 2018 22:58), Max: 37.6 (30 Aug 2018 22:58)  T(F): 99.6 (30 Aug 2018 22:58), Max: 99.6 (30 Aug 2018 22:58)  HR: 52 (30 Aug 2018 22:58) (52 - 84)  BP: 155/62 (30 Aug 2018 22:58) (155/62 - 165/77)  BP(mean): --  RR: 20 (30 Aug 2018 22:58) (20 - 20)  SpO2: 99% (30 Aug 2018 22:58) (99% - 99%)    Neurologic Examination:  General:  Appearance is consistent with chronologic age.  No abnormal facies.   Alert and Orientedx 3  PEERLA EOMI  No facial Noted Tongue Midline  Speech Intact   Motor: RUE 5/5 LUE 3+/5   LLE 4/5 RLE 5/5  + Left side drift  Sensory: grossly intact to light touch  Cerebellar; FTN = Dysmetria on Left (past pointing)  Gait Not tested     Labs:   CBC Full  -  ( 30 Aug 2018 19:03 )  WBC Count : 7.79 K/uL  Hemoglobin : 11.7 g/dL  Hematocrit : 35.0 %  Platelet Count - Automated : 215 K/uL  Mean Cell Volume : 83.7 fL  Mean Cell Hemoglobin : 28.0 pg  Mean Cell Hemoglobin Concentration : 33.4 g/dL  Auto Neutrophil # : 5.59 K/uL  Auto Lymphocyte # : 1.44 K/uL  Auto Monocyte # : 0.65 K/uL  Auto Eosinophil # : 0.06 K/uL  Auto Basophil # : 0.04 K/uL  Auto Neutrophil % : 71.8 %  Auto Lymphocyte % : 18.5 %  Auto Monocyte % : 8.3 %  Auto Eosinophil % : 0.8 %  Auto Basophil % : 0.5 %        140  |  99  |  21<H>  ----------------------------<  162<H>  4.9   |  20  |  0.9    Ca    9.5      30 Aug 2018 19:03    TPro  7.1  /  Alb  4.4  /  TBili  0.3  /  DBili  x   /  AST  27  /  ALT  17  /  AlkPhos  78  08-30    LIVER FUNCTIONS - ( 30 Aug 2018 19:03 )  Alb: 4.4 g/dL / Pro: 7.1 g/dL / ALK PHOS: 78 U/L / ALT: 17 U/L / AST: 27 U/L / GGT: x             Urinalysis Basic - ( 30 Aug 2018 19:03 )    Color: Yellow / Appearance: Cloudy / S.020 / pH: x  Gluc: x / Ketone: Negative  / Bili: Negative / Urobili: 0.2 mg/dL   Blood: x / Protein: 100 mg/dL / Nitrite: Negative   Leuk Esterase: Large / RBC: 25-50 /HPF / WBC 26-50 /HPF   Sq Epi: x / Non Sq Epi: x / Bacteria: x    Neuroimaging:  NCHCT: CT Head No Cont:   EXAM:  CT BRAIN            PROCEDURE DATE:  2018      INTERPRETATION:  Clinical History / Reason for exam: Vertigo    Technique: Multiple contiguous axial CT images of the head were obtained    from the base of the skull to the vertex without administration of   intravenous contrast. Coronal and sagittal reformats were obtained.    Comparison: CT head dated 3/7/2018    Findings:    The cortical sulci and ventricular system are appropriate for patient's   stated age.     Confluent periventricular white matter disease consistent with chronic   microvascular ischemic changes.    Stable hypoattenuation in the right occipital lobe representing chronic   infarct.    There is no acute mass effect, midline shift or intracranial hemorrhage.      The calvarium is intact.    The visualized paranasal sinuses and bilateral mastoid complexes are   unremarkable. The globes and orbits are unremarkable.    Impression:     1.  Stable chronic infarct in the right occipital lobe.    2.  Stable chronic microvascular ischemic changes.    3.  No CT evidence of acute intracranial abnormality.    QUINTON LOREDO M.D., RESIDENT RADIOLOGIST  This document has been electronically signed.  MATT SANCHEZ M.D., ATTENDING RADIOLOGIST  This document has been electronically signed. Aug 30 2018  8:37PM     (18 @ 20:22)    CT Angiography/Perfusion: Milds 50% stenosis of b/l internal carotids arteries. No large vessel occlusion seen

## 2018-08-31 NOTE — H&P ADULT - HISTORY OF PRESENT ILLNESS
84 yo F with PMH of DM, HTN, chronic vertigo, multiple strokes in past w/ residual L-sided weakness presented to ED with worsening dizziness. Dizziness started ~1 week PTP. States it is more a lightheaded sensation than room spinning. Reports occasional associated headaches. Denies chest pain, palpitations, vision changes, nausea, vomiting, or any falls or LOC. States symptoms started at same time she began having urinary symptoms (burning sensation). Has had frequent UTI's in past. Was told by her PCP she has a UTI and was given prescription of Abx. Patient had issue filling prescription (wrong number of pills sent?). Before mistake could be corrected patient came in because symptoms became so bad. Denies any fever or chills at home. At baseline patient ambulates with cane. Has no steps at home though does not believe she could do them if she had to. 84 yo F with PMH of DM, HTN, chronic vertigo, multiple strokes in past w/ residual L-sided weakness presented to ED with worsening dizziness. Dizziness started ~1 week PTP. States it is more a lightheaded sensation than room spinning. occurs when standing up, looking at certain objects. had this for many months since her stroke 6 mos ago. Reports occasional associated headaches. Denies chest pain, palpitations, vision changes, nausea, vomiting, or any falls or LOC. States symptoms started at same time she began having urinary symptoms (burning sensation). Has had frequent UTI's in past. Was told by her PCP she has a UTI and was given prescription of Abx. Patient had issue filling prescription (wrong number of pills sent?). Before mistake could be corrected patient came in because symptoms became so bad. Denies any fever or chills at home. At baseline patient ambulates with cane. Has no steps at home though does not believe she could do them if she had to.

## 2018-09-01 ENCOUNTER — TRANSCRIPTION ENCOUNTER (OUTPATIENT)
Age: 83
End: 2018-09-01

## 2018-09-01 VITALS — OXYGEN SATURATION: 97 %

## 2018-09-01 LAB
-  AMIKACIN: SIGNIFICANT CHANGE UP
-  AMOXICILLIN/CLAVULANIC ACID: SIGNIFICANT CHANGE UP
-  AMPICILLIN/SULBACTAM: SIGNIFICANT CHANGE UP
-  AMPICILLIN: SIGNIFICANT CHANGE UP
-  AZTREONAM: SIGNIFICANT CHANGE UP
-  CEFAZOLIN: SIGNIFICANT CHANGE UP
-  CEFEPIME: SIGNIFICANT CHANGE UP
-  CEFOXITIN: SIGNIFICANT CHANGE UP
-  CEFTRIAXONE: SIGNIFICANT CHANGE UP
-  CIPROFLOXACIN: SIGNIFICANT CHANGE UP
-  ERTAPENEM: SIGNIFICANT CHANGE UP
-  GENTAMICIN: SIGNIFICANT CHANGE UP
-  IMIPENEM: SIGNIFICANT CHANGE UP
-  LEVOFLOXACIN: SIGNIFICANT CHANGE UP
-  MEROPENEM: SIGNIFICANT CHANGE UP
-  NITROFURANTOIN: SIGNIFICANT CHANGE UP
-  PIPERACILLIN/TAZOBACTAM: SIGNIFICANT CHANGE UP
-  TIGECYCLINE: SIGNIFICANT CHANGE UP
-  TOBRAMYCIN: SIGNIFICANT CHANGE UP
-  TRIMETHOPRIM/SULFAMETHOXAZOLE: SIGNIFICANT CHANGE UP
ANION GAP SERPL CALC-SCNC: 21 MMOL/L — HIGH (ref 7–14)
BUN SERPL-MCNC: 14 MG/DL — SIGNIFICANT CHANGE UP (ref 10–20)
CALCIUM SERPL-MCNC: 10.2 MG/DL — HIGH (ref 8.5–10.1)
CHLORIDE SERPL-SCNC: 95 MMOL/L — LOW (ref 98–110)
CO2 SERPL-SCNC: 21 MMOL/L — SIGNIFICANT CHANGE UP (ref 17–32)
CREAT SERPL-MCNC: 0.9 MG/DL — SIGNIFICANT CHANGE UP (ref 0.7–1.5)
CULTURE RESULTS: SIGNIFICANT CHANGE UP
GLUCOSE BLDC GLUCOMTR-MCNC: 179 MG/DL — HIGH (ref 70–99)
GLUCOSE BLDC GLUCOMTR-MCNC: 264 MG/DL — HIGH (ref 70–99)
GLUCOSE BLDC GLUCOMTR-MCNC: 310 MG/DL — HIGH (ref 70–99)
GLUCOSE BLDC GLUCOMTR-MCNC: 330 MG/DL — HIGH (ref 70–99)
GLUCOSE SERPL-MCNC: 199 MG/DL — HIGH (ref 70–99)
HCT VFR BLD CALC: 38.4 % — SIGNIFICANT CHANGE UP (ref 37–47)
HGB BLD-MCNC: 12.7 G/DL — SIGNIFICANT CHANGE UP (ref 12–16)
MCHC RBC-ENTMCNC: 27.8 PG — SIGNIFICANT CHANGE UP (ref 27–31)
MCHC RBC-ENTMCNC: 33.1 G/DL — SIGNIFICANT CHANGE UP (ref 32–37)
MCV RBC AUTO: 84 FL — SIGNIFICANT CHANGE UP (ref 81–99)
METHOD TYPE: SIGNIFICANT CHANGE UP
NRBC # BLD: 0 /100 WBCS — SIGNIFICANT CHANGE UP (ref 0–0)
ORGANISM # SPEC MICROSCOPIC CNT: SIGNIFICANT CHANGE UP
ORGANISM # SPEC MICROSCOPIC CNT: SIGNIFICANT CHANGE UP
PLATELET # BLD AUTO: 224 K/UL — SIGNIFICANT CHANGE UP (ref 130–400)
POTASSIUM SERPL-MCNC: 4.5 MMOL/L — SIGNIFICANT CHANGE UP (ref 3.5–5)
POTASSIUM SERPL-SCNC: 4.5 MMOL/L — SIGNIFICANT CHANGE UP (ref 3.5–5)
RBC # BLD: 4.57 M/UL — SIGNIFICANT CHANGE UP (ref 4.2–5.4)
RBC # FLD: 13.4 % — SIGNIFICANT CHANGE UP (ref 11.5–14.5)
SODIUM SERPL-SCNC: 137 MMOL/L — SIGNIFICANT CHANGE UP (ref 135–146)
SPECIMEN SOURCE: SIGNIFICANT CHANGE UP
WBC # BLD: 8.08 K/UL — SIGNIFICANT CHANGE UP (ref 4.8–10.8)
WBC # FLD AUTO: 8.08 K/UL — SIGNIFICANT CHANGE UP (ref 4.8–10.8)

## 2018-09-01 RX ORDER — INSULIN LISPRO 100/ML
3 VIAL (ML) SUBCUTANEOUS ONCE
Qty: 0 | Refills: 0 | Status: COMPLETED | OUTPATIENT
Start: 2018-09-01 | End: 2018-09-01

## 2018-09-01 RX ORDER — MOXIFLOXACIN HYDROCHLORIDE TABLETS, 400 MG 400 MG/1
1 TABLET, FILM COATED ORAL
Qty: 10 | Refills: 0
Start: 2018-09-01 | End: 2018-09-05

## 2018-09-01 RX ADMIN — ENOXAPARIN SODIUM 40 MILLIGRAM(S): 100 INJECTION SUBCUTANEOUS at 05:22

## 2018-09-01 RX ADMIN — CHLORHEXIDINE GLUCONATE 1 APPLICATION(S): 213 SOLUTION TOPICAL at 05:22

## 2018-09-01 RX ADMIN — Medication 3 UNIT(S): at 11:35

## 2018-09-01 RX ADMIN — Medication 81 MILLIGRAM(S): at 11:05

## 2018-09-01 RX ADMIN — CLOPIDOGREL BISULFATE 75 MILLIGRAM(S): 75 TABLET, FILM COATED ORAL at 11:05

## 2018-09-01 RX ADMIN — LISINOPRIL 5 MILLIGRAM(S): 2.5 TABLET ORAL at 05:22

## 2018-09-01 NOTE — PROGRESS NOTE ADULT - SUBJECTIVE AND OBJECTIVE BOX
Patient is a 83y old  Female who presents with a chief complaint of worsening dizziness (31 Aug 2018 03:53)      PAST MEDICAL & SURGICAL HISTORY:  Breast cancer  CVA (cerebral vascular accident)  Vertigo  HTN (hypertension)  DM (diabetes mellitus)  H/O mastectomy      MEDICATIONS  (STANDING):  aspirin  chewable 81 milliGRAM(s) Oral daily  atorvastatin 40 milliGRAM(s) Oral at bedtime  cefTRIAXone   IVPB 1 Gram(s) IV Intermittent every 24 hours  chlorhexidine 4% Liquid 1 Application(s) Topical <User Schedule>  clopidogrel Tablet 75 milliGRAM(s) Oral daily  enoxaparin Injectable 40 milliGRAM(s) SubCutaneous every 24 hours  lisinopril 5 milliGRAM(s) Oral daily        Vital Signs Last 24 Hrs  T(C): 36.4 (01 Sep 2018 05:47), Max: 37.2 (31 Aug 2018 20:11)  T(F): 97.6 (01 Sep 2018 05:47), Max: 98.9 (31 Aug 2018 20:11)  HR: 64 (31 Aug 2018 20:11) (64 - 73)  BP: 117/61 (31 Aug 2018 20:11) (117/55 - 117/61)  BP(mean): --  RR: 18 (01 Sep 2018 05:47) (18 - 18)  SpO2: 97% (01 Sep 2018 07:20) (96% - 97%)  CAPILLARY BLOOD GLUCOSE  179 (01 Sep 2018 07:20)  178 (01 Sep 2018 02:00)  240 (31 Aug 2018 22:15)      POCT Blood Glucose.: 179 mg/dL (01 Sep 2018 07:17)  POCT Blood Glucose.: 240 mg/dL (31 Aug 2018 22:14)    I&O's Summary    31 Aug 2018 07:  -  01 Sep 2018 07:00  --------------------------------------------------------  IN: 0 mL / OUT: 900 mL / NET: -900 mL    01 Sep 2018 07:  -  01 Sep 2018 09:48  --------------------------------------------------------  IN: 0 mL / OUT: 100 mL / NET: -100 mL        Physical Exam:    GEN: NAD, comfortable, no new complaints, sitting up in chair  CARDIO: RRR, no m/r/g  RESP: cta b/l   ABD: soft, NT, +BS  EXT: no edema  NEURO: AAOx3, LUE + LLE strength 4/5, RUE + RLE 5/5        Labs:                        12.7   8.08  )-----------( 224      ( 01 Sep 2018 08:47 )             38.4             09-    137  |  95<L>  |  14  ----------------------------<  199<H>  4.5   |  21  |  0.9    Ca    10.2<H>      01 Sep 2018 08:47  Mg     1.9     08-31    TPro  7.1  /  Alb  4.4  /  TBili  0.3  /  DBili  x   /  AST  27  /  ALT  17  /  AlkPhos  78  08-30    LIVER FUNCTIONS - ( 30 Aug 2018 19:03 )  Alb: 4.4 g/dL / Pro: 7.1 g/dL / ALK PHOS: 78 U/L / ALT: 17 U/L / AST: 27 U/L / GGT: x                   CARDIAC MARKERS ( 31 Aug 2018 12:24 )  x     / <0.01 ng/mL / 178 U/L / x     / 2.8 ng/mL  CARDIAC MARKERS ( 30 Aug 2018 19:03 )  x     / <0.01 ng/mL / x     / x     / x            Urinalysis Basic - ( 30 Aug 2018 19:03 )    Color: Yellow / Appearance: Cloudy / S.020 / pH: x  Gluc: x / Ketone: Negative  / Bili: Negative / Urobili: 0.2 mg/dL   Blood: x / Protein: 100 mg/dL / Nitrite: Negative   Leuk Esterase: Large / RBC: 25-50 /HPF / WBC 26-50 /HPF   Sq Epi: x / Non Sq Epi: x / Bacteria: x        Culture - Urine (collected 30 Aug 2018 19:03)  Source: .Urine Clean Catch (Midstream)  Preliminary Report (31 Aug 2018 18:51):    >100,000 CFU/ml Escherichia coli Patient is a 83y old  Female who presents with a chief complaint of worsening dizziness (31 Aug 2018 03:53)  no cp, sob, abd pain, fever, dizziness.    PAST MEDICAL & SURGICAL HISTORY:  Breast cancer  CVA (cerebral vascular accident)  Vertigo  HTN (hypertension)  DM (diabetes mellitus)  H/O mastectomy      MEDICATIONS  (STANDING):  aspirin  chewable 81 milliGRAM(s) Oral daily  atorvastatin 40 milliGRAM(s) Oral at bedtime  cefTRIAXone   IVPB 1 Gram(s) IV Intermittent every 24 hours  chlorhexidine 4% Liquid 1 Application(s) Topical <User Schedule>  clopidogrel Tablet 75 milliGRAM(s) Oral daily  enoxaparin Injectable 40 milliGRAM(s) SubCutaneous every 24 hours  lisinopril 5 milliGRAM(s) Oral daily        Vital Signs Last 24 Hrs  T(C): 36.4 (01 Sep 2018 05:47), Max: 37.2 (31 Aug 2018 20:11)  T(F): 97.6 (01 Sep 2018 05:47), Max: 98.9 (31 Aug 2018 20:11)  HR: 64 (31 Aug 2018 20:11) (64 - 73)  BP: 117/61 (31 Aug 2018 20:11) (117/55 - 117/61)  BP(mean): --  RR: 18 (01 Sep 2018 05:47) (18 - 18)  SpO2: 97% (01 Sep 2018 07:20) (96% - 97%)  CAPILLARY BLOOD GLUCOSE  179 (01 Sep 2018 07:20)  178 (01 Sep 2018 02:00)  240 (31 Aug 2018 22:15)      POCT Blood Glucose.: 179 mg/dL (01 Sep 2018 07:17)  POCT Blood Glucose.: 240 mg/dL (31 Aug 2018 22:14)    I&O's Summary    31 Aug 2018 07:  -  01 Sep 2018 07:00  --------------------------------------------------------  IN: 0 mL / OUT: 900 mL / NET: -900 mL    01 Sep 2018 07:  -  01 Sep 2018 09:48  --------------------------------------------------------  IN: 0 mL / OUT: 100 mL / NET: -100 mL        Physical Exam:    GEN: NAD, comfortable, no new complaints, sitting up in chair  CARDIO: RRR, no m/r/g  RESP: cta b/l   ABD: soft, NT, +BS  EXT: no edema  NEURO: AAOx3, LUE + LLE strength 4/5, RUE + RLE 5/5        Labs:                        12.7   8.08  )-----------( 224      ( 01 Sep 2018 08:47 )             38.4             09-    137  |  95<L>  |  14  ----------------------------<  199<H>  4.5   |  21  |  0.9    Ca    10.2<H>      01 Sep 2018 08:47  Mg     1.9     08-    TPro  7.1  /  Alb  4.4  /  TBili  0.3  /  DBili  x   /  AST  27  /  ALT  17  /  AlkPhos  78  08-30    LIVER FUNCTIONS - ( 30 Aug 2018 19:03 )  Alb: 4.4 g/dL / Pro: 7.1 g/dL / ALK PHOS: 78 U/L / ALT: 17 U/L / AST: 27 U/L / GGT: x                   CARDIAC MARKERS ( 31 Aug 2018 12:24 )  x     / <0.01 ng/mL / 178 U/L / x     / 2.8 ng/mL  CARDIAC MARKERS ( 30 Aug 2018 19:03 )  x     / <0.01 ng/mL / x     / x     / x            Urinalysis Basic - ( 30 Aug 2018 19:03 )    Color: Yellow / Appearance: Cloudy / S.020 / pH: x  Gluc: x / Ketone: Negative  / Bili: Negative / Urobili: 0.2 mg/dL   Blood: x / Protein: 100 mg/dL / Nitrite: Negative   Leuk Esterase: Large / RBC: 25-50 /HPF / WBC 26-50 /HPF   Sq Epi: x / Non Sq Epi: x / Bacteria: x        Culture - Urine (collected 30 Aug 2018 19:03)  Source: .Urine Clean Catch (Midstream)  Preliminary Report (31 Aug 2018 18:51):    >100,000 CFU/ml Escherichia coli

## 2018-09-01 NOTE — DISCHARGE NOTE ADULT - HOSPITAL COURSE
83F PMHx DM2, HTN, vertigo, prior CVA with left sided weakness here with dizziness. Vital signs stable on admission, CTH negative for acute stroke. Neuro exam nonfocal. Noted to have ecoli uti, started on ceftriaxone. Orthostatics neg, of note + horizontal nystagmus. Her dizziness improved after abx therapy started, she was able to ambulate without provoking dizziness. Neuro evaluated pt. Suspected cause of dizziness was due to uti. She was deemed stable for discharge on 9/1 on cipro. She will follow up with pmd; of note she has uncontrolled dm2, needs to f/u pmd.     50 minutes spent on discharge planning.

## 2018-09-01 NOTE — DISCHARGE NOTE ADULT - CARE PLAN
Principal Discharge DX:	UTI (urinary tract infection)  Goal:	Please take antibiotics as above  Assessment and plan of treatment:	Please take antibiotics as above and follow up with Dr. Castro in one week.  Secondary Diagnosis:	Vertigo Principal Discharge DX:	UTI (urinary tract infection)  Goal:	Please take antibiotics as above  Assessment and plan of treatment:	Please take antibiotics as above and follow up with Dr. Castro in one week.  Secondary Diagnosis:	Vertigo  Goal:	Follow up with Dr. Castro in one week  Assessment and plan of treatment:	We suspect this may be due to your uti. Please follow up with Dr. Castro in one week.  Secondary Diagnosis:	DM (diabetes mellitus)  Goal:	Please follow up with Dr. Castro in one week  Assessment and plan of treatment:	You were found to have high blood glucose levels. Please take the medications as listed above and follow up with Dr. Castro in one week.

## 2018-09-01 NOTE — DISCHARGE NOTE ADULT - MEDICATION SUMMARY - MEDICATIONS TO TAKE
I will START or STAY ON the medications listed below when I get home from the hospital:    aspirin 81 mg oral tablet  -- 1 tab(s) by mouth once a day  -- Indication: For CVA (cerebral vascular accident)    quinapril 5 mg oral tablet  -- 1 tab(s) by mouth once a day  -- Indication: For HTN (hypertension)    glimepiride 4 mg oral tablet  -- 1 tab(s) by mouth once a day  -- Indication: For Diabetes    atorvastatin 40 mg oral tablet  -- 1 tab(s) by mouth once a day  -- Indication: For CVA (cerebral vascular accident)    clopidogrel 75 mg oral tablet  -- 1 tab(s) by mouth once a day  -- Indication: For CVA (cerebral vascular accident)    Cipro 500 mg oral tablet  -- 1 tab(s) by mouth 2 times a day   -- Avoid prolonged or excessive exposure to direct and/or artificial sunlight while taking this medication.  Check with your doctor before becoming pregnant.  Do not take dairy products, antacids, or iron preparations within one hour of this medication.  Finish all this medication unless otherwise directed by prescriber.  Medication should be taken with plenty of water.    -- Indication: For UTI (urinary tract infection)

## 2018-09-01 NOTE — DISCHARGE NOTE ADULT - PATIENT PORTAL LINK FT
You can access the [facility] Patient Portal, offered by [facility], by registering with the following website: [email or web address]

## 2018-09-01 NOTE — PROGRESS NOTE ADULT - ATTENDING COMMENTS
dizziness resolved, suspect due to uti  d/c today on cipro  will f/u cultures  elevated sugars: cont home meds but need to f/u pmd

## 2018-09-01 NOTE — DISCHARGE NOTE ADULT - PLAN OF CARE
Please take antibiotics as above Please take antibiotics as above and follow up with Dr. Castro in one week. Follow up with Dr. Castro in one week We suspect this may be due to your uti. Please follow up with Dr. Castro in one week. Please follow up with Dr. Castro in one week You were found to have high blood glucose levels. Please take the medications as listed above and follow up with Dr. Castro in one week.

## 2018-09-01 NOTE — PROGRESS NOTE ADULT - ASSESSMENT
82 yo F with PMH of DM, HTN, chronic vertigo, multiple strokes in past w/ residual L-sided weakness presented to ED with worsening dizziness. UA +ve with symptoms. In ED CT head + CTA head/neck was negative, CT abd/pel also negative. Seen by Neuro who recommended patient get MRI.    #) Vertigo  - has history of chronic vertigo but came to hospital because worse than usual  - possibly 2/2 to UTI and autonomic dysreflexia?  - reports worsening symptoms with Meclizine  - pt refused MRI/MRA  -neuro advised continue present management, vestibular treatment as outpt, f/u neuro as outpt  -awaiting PT rehab     #) Cystitis, uncomplicated  - UA +ve, patient reports burning + abdominal discomfort. No fevers  - urine culture: prelim:  E. coli   - c/w Rocephin 1g q24     #) DM - holding home Glimepiride, monitor fs, insulin PRN    #) HTN - c/w ACE    #) hx of CVA - c/w DAPT + statin    DVT ppx: Lovenox subQ  Diet: DASH, carb consistent  Activity: OOBTC  Code status: FULL

## 2018-09-06 DIAGNOSIS — I10 ESSENTIAL (PRIMARY) HYPERTENSION: ICD-10-CM

## 2018-09-06 DIAGNOSIS — I69.954 HEMIPLEGIA AND HEMIPARESIS FOLLOWING UNSPECIFIED CEREBROVASCULAR DISEASE AFFECTING LEFT NON-DOMINANT SIDE: ICD-10-CM

## 2018-09-06 DIAGNOSIS — N39.0 URINARY TRACT INFECTION, SITE NOT SPECIFIED: ICD-10-CM

## 2018-09-06 DIAGNOSIS — E11.65 TYPE 2 DIABETES MELLITUS WITH HYPERGLYCEMIA: ICD-10-CM

## 2018-09-06 DIAGNOSIS — R42 DIZZINESS AND GIDDINESS: ICD-10-CM

## 2018-09-06 LAB — GLUCOSE BLDC GLUCOMTR-MCNC: 178 MG/DL — HIGH (ref 70–99)

## 2019-08-30 ENCOUNTER — EMERGENCY (EMERGENCY)
Facility: HOSPITAL | Age: 84
LOS: 0 days | Discharge: HOME | End: 2019-08-31
Attending: EMERGENCY MEDICINE | Admitting: EMERGENCY MEDICINE
Payer: MEDICARE

## 2019-08-30 VITALS
SYSTOLIC BLOOD PRESSURE: 182 MMHG | RESPIRATION RATE: 20 BRPM | DIASTOLIC BLOOD PRESSURE: 77 MMHG | TEMPERATURE: 98 F | OXYGEN SATURATION: 98 % | HEART RATE: 70 BPM

## 2019-08-30 DIAGNOSIS — Z87.891 PERSONAL HISTORY OF NICOTINE DEPENDENCE: ICD-10-CM

## 2019-08-30 DIAGNOSIS — R07.9 CHEST PAIN, UNSPECIFIED: ICD-10-CM

## 2019-08-30 DIAGNOSIS — Z90.10 ACQUIRED ABSENCE OF UNSPECIFIED BREAST AND NIPPLE: ICD-10-CM

## 2019-08-30 DIAGNOSIS — Z90.10 ACQUIRED ABSENCE OF UNSPECIFIED BREAST AND NIPPLE: Chronic | ICD-10-CM

## 2019-08-30 DIAGNOSIS — I10 ESSENTIAL (PRIMARY) HYPERTENSION: ICD-10-CM

## 2019-08-30 DIAGNOSIS — Z85.3 PERSONAL HISTORY OF MALIGNANT NEOPLASM OF BREAST: ICD-10-CM

## 2019-08-30 DIAGNOSIS — E78.5 HYPERLIPIDEMIA, UNSPECIFIED: ICD-10-CM

## 2019-08-30 DIAGNOSIS — Z86.73 PERSONAL HISTORY OF TRANSIENT ISCHEMIC ATTACK (TIA), AND CEREBRAL INFARCTION WITHOUT RESIDUAL DEFICITS: ICD-10-CM

## 2019-08-30 DIAGNOSIS — E11.9 TYPE 2 DIABETES MELLITUS WITHOUT COMPLICATIONS: ICD-10-CM

## 2019-08-30 PROBLEM — R42 DIZZINESS AND GIDDINESS: Chronic | Status: ACTIVE | Noted: 2018-08-31

## 2019-08-30 PROBLEM — C50.919 MALIGNANT NEOPLASM OF UNSPECIFIED SITE OF UNSPECIFIED FEMALE BREAST: Chronic | Status: ACTIVE | Noted: 2018-08-31

## 2019-08-30 PROBLEM — I63.9 CEREBRAL INFARCTION, UNSPECIFIED: Chronic | Status: ACTIVE | Noted: 2018-08-31

## 2019-08-30 LAB
ALBUMIN SERPL ELPH-MCNC: 4.4 G/DL — SIGNIFICANT CHANGE UP (ref 3.5–5.2)
ALP SERPL-CCNC: 67 U/L — SIGNIFICANT CHANGE UP (ref 30–115)
ALT FLD-CCNC: 11 U/L — SIGNIFICANT CHANGE UP (ref 0–41)
ANION GAP SERPL CALC-SCNC: 11 MMOL/L — SIGNIFICANT CHANGE UP (ref 7–14)
AST SERPL-CCNC: 18 U/L — SIGNIFICANT CHANGE UP (ref 0–41)
BILIRUB SERPL-MCNC: <0.2 MG/DL — SIGNIFICANT CHANGE UP (ref 0.2–1.2)
BUN SERPL-MCNC: 15 MG/DL — SIGNIFICANT CHANGE UP (ref 10–20)
CALCIUM SERPL-MCNC: 10 MG/DL — SIGNIFICANT CHANGE UP (ref 8.5–10.1)
CHLORIDE SERPL-SCNC: 104 MMOL/L — SIGNIFICANT CHANGE UP (ref 98–110)
CO2 SERPL-SCNC: 29 MMOL/L — SIGNIFICANT CHANGE UP (ref 17–32)
CREAT SERPL-MCNC: 0.8 MG/DL — SIGNIFICANT CHANGE UP (ref 0.7–1.5)
GLUCOSE SERPL-MCNC: 117 MG/DL — HIGH (ref 70–99)
HCT VFR BLD CALC: 34.8 % — LOW (ref 37–47)
HGB BLD-MCNC: 11.3 G/DL — LOW (ref 12–16)
MCHC RBC-ENTMCNC: 27.8 PG — SIGNIFICANT CHANGE UP (ref 27–31)
MCHC RBC-ENTMCNC: 32.5 G/DL — SIGNIFICANT CHANGE UP (ref 32–37)
MCV RBC AUTO: 85.7 FL — SIGNIFICANT CHANGE UP (ref 81–99)
NRBC # BLD: 0 /100 WBCS — SIGNIFICANT CHANGE UP (ref 0–0)
PLATELET # BLD AUTO: 177 K/UL — SIGNIFICANT CHANGE UP (ref 130–400)
POTASSIUM SERPL-MCNC: 4.1 MMOL/L — SIGNIFICANT CHANGE UP (ref 3.5–5)
POTASSIUM SERPL-SCNC: 4.1 MMOL/L — SIGNIFICANT CHANGE UP (ref 3.5–5)
PROT SERPL-MCNC: 6.9 G/DL — SIGNIFICANT CHANGE UP (ref 6–8)
RBC # BLD: 4.06 M/UL — LOW (ref 4.2–5.4)
RBC # FLD: 13.2 % — SIGNIFICANT CHANGE UP (ref 11.5–14.5)
SODIUM SERPL-SCNC: 144 MMOL/L — SIGNIFICANT CHANGE UP (ref 135–146)
TROPONIN T SERPL-MCNC: <0.01 NG/ML — SIGNIFICANT CHANGE UP
TROPONIN T SERPL-MCNC: <0.01 NG/ML — SIGNIFICANT CHANGE UP
WBC # BLD: 7.57 K/UL — SIGNIFICANT CHANGE UP (ref 4.8–10.8)
WBC # FLD AUTO: 7.57 K/UL — SIGNIFICANT CHANGE UP (ref 4.8–10.8)

## 2019-08-30 PROCEDURE — 99220: CPT

## 2019-08-30 PROCEDURE — 93010 ELECTROCARDIOGRAM REPORT: CPT

## 2019-08-30 PROCEDURE — 93010 ELECTROCARDIOGRAM REPORT: CPT | Mod: 77

## 2019-08-30 PROCEDURE — 71045 X-RAY EXAM CHEST 1 VIEW: CPT | Mod: 26

## 2019-08-30 RX ORDER — ADENOSINE 3 MG/ML
60 INJECTION INTRAVENOUS ONCE
Refills: 0 | Status: COMPLETED | OUTPATIENT
Start: 2019-08-30 | End: 2019-08-31

## 2019-08-30 RX ORDER — ASPIRIN/CALCIUM CARB/MAGNESIUM 324 MG
325 TABLET ORAL ONCE
Refills: 0 | Status: COMPLETED | OUTPATIENT
Start: 2019-08-30 | End: 2019-08-30

## 2019-08-30 RX ORDER — GLIMEPIRIDE 1 MG
4 TABLET ORAL ONCE
Refills: 0 | Status: COMPLETED | OUTPATIENT
Start: 2019-08-30 | End: 2019-08-30

## 2019-08-30 RX ORDER — CLOPIDOGREL BISULFATE 75 MG/1
75 TABLET, FILM COATED ORAL DAILY
Refills: 0 | Status: DISCONTINUED | OUTPATIENT
Start: 2019-08-30 | End: 2019-08-31

## 2019-08-30 RX ORDER — METFORMIN HYDROCHLORIDE 850 MG/1
1000 TABLET ORAL ONCE
Refills: 0 | Status: COMPLETED | OUTPATIENT
Start: 2019-08-30 | End: 2019-08-30

## 2019-08-30 RX ORDER — ATORVASTATIN CALCIUM 80 MG/1
20 TABLET, FILM COATED ORAL ONCE
Refills: 0 | Status: COMPLETED | OUTPATIENT
Start: 2019-08-30 | End: 2019-08-30

## 2019-08-30 RX ADMIN — METFORMIN HYDROCHLORIDE 1000 MILLIGRAM(S): 850 TABLET ORAL at 22:25

## 2019-08-30 RX ADMIN — Medication 4 MILLIGRAM(S): at 22:26

## 2019-08-30 RX ADMIN — Medication 325 MILLIGRAM(S): at 15:26

## 2019-08-30 RX ADMIN — ATORVASTATIN CALCIUM 20 MILLIGRAM(S): 80 TABLET, FILM COATED ORAL at 22:26

## 2019-08-30 RX ADMIN — CLOPIDOGREL BISULFATE 75 MILLIGRAM(S): 75 TABLET, FILM COATED ORAL at 22:28

## 2019-08-30 NOTE — ED PROVIDER NOTE - MUSCULOSKELETAL, MLM
TTP to anterior chest wall near the costochondral joint; no palpable crepitus or bony deformities. Range of motion is not limited.

## 2019-08-30 NOTE — ED CDU PROVIDER INITIAL DAY NOTE - PHYSICAL EXAMINATION
CONSTITUTIONAL: Well-developed; well-nourished; in no acute distress.   SKIN: warm, dry  HEAD: Normocephalic; atraumatic.  EYES: PERRL, EOMI, normal sclera and conjunctiva   ENT: No nasal discharge; airway clear.  NECK: Supple; non tender.  CARD: S1, S2 normal; no murmurs, gallops, or rubs. Regular rate and rhythm. Reproducible cp over L anterior chest.   RESP: No wheezes, rales or rhonchi.  ABD: soft ntnd  EXT: Normal ROM.  No clubbing, cyanosis or edema. No posterior calf ttp.   LYMPH: No acute cervical adenopathy.  NEURO: Alert, oriented, grossly unremarkable  PSYCH: Cooperative, appropriate.

## 2019-08-30 NOTE — ED CDU PROVIDER INITIAL DAY NOTE - MEDICAL DECISION MAKING DETAILS
patient evaluated for chest pain, symptoms atypical but given age, placed in obs after negative troponin and nml ekg for further risk stratification.

## 2019-08-30 NOTE — ED ADULT TRIAGE NOTE - CHIEF COMPLAINT QUOTE
Sent from Urgent care for chest pain. Patient denies CP at this time. States she feels "off", but gets these episodes since previous stroke 2 years ago. Hx left side residual from previous stroke.

## 2019-08-30 NOTE — ED CDU PROVIDER INITIAL DAY NOTE - OBJECTIVE STATEMENT
84 y f pmh cva with no residual deficits, dm, htn, hld, breast ca in remission s/p mastectomy pw chest pain. CP for the past day. Sharp, intermittent. Worse with movement, improving since arrival to ED. NO associated sx. Tried aspirin for pain relief with no relief. Denies fever, chills, cough, sob, back pain, n/v, abd pain, diarrhea, headache. Unsure when she had cardiac workup done last, unsure who cardiologist is.

## 2019-08-30 NOTE — ED PROVIDER NOTE - ATTENDING CONTRIBUTION TO CARE
h/o R mastectomy, HTN, CVA, and DM presents to the ED with  of L sided chest pain onset 3AM today that is decreasing in severity, no associated symptoms with it.  patient discussed this with pmd who requested patient to come here. on exam nml heart and lungs, abd soft, ext nml plan is to evaluat for acs. h/o R mastectomy, HTN, CVA, and DM presents to the ED with  of L sided chest pain onset 3AM today that is decreasing in severity, no associated symptoms with it.  patient discussed this with pmd who requested patient to come here. on exam nml heart and lungs however there is reproducible chest wall tenderness anteriorly , abd soft, ext nml plan is to evaluat for acs.

## 2019-08-30 NOTE — ED CDU PROVIDER INITIAL DAY NOTE - ATTENDING CONTRIBUTION TO CARE
presents with chest pain. Cx 1 day . Sharp, intermittent. Worse with movement, improved since arrival to Ed without any associated symptoms. exam is nml, imp: chest pain, obtain cardiac enzymes, ekg, cxr.

## 2019-08-30 NOTE — ED PROVIDER NOTE - OBJECTIVE STATEMENT
Pt is an 83 yo F with a h/o R mastectomy, HTN, CVA, and DM presents to the ED with sudden onset of L sided chest pain onset 3AM today that woke pt from sleeping. Describes pain as sharp, intermittent, and occasionally radiates to the back. Pain has decreased in severity since onset. Also c/o mild L ankle swelling x 3 days. Denies shortness of breath, diaphoresis, nausea, vomiting, abdominal pain, or other signs and sx. She had a cardiac workup approximately 4 years ago and does not remember her cardiologist's name. She has a history of chronic dizziness and has been evaluated by her PCP for this; states this episode of dizziness is similar to previous episodes with no changes in quality or severity. No further complaints at this time.     PCP: Julia Pt is an 85 yo F with a h/o R mastectomy, HTN, CVA, and DM presents to the ED with  of L sided chest pain onset 3AM today that woke pt from sleeping. Describes pain as sharp, intermittent, and occasionally radiates to the back. Pain has decreased in severity since onset.  Denies shortness of breath, diaphoresis, nausea, vomiting, abdominal pain, or other signs and sx. She had a cardiac workup approximately 4 years ago and does not remember her cardiologist's name. She has a history of chronic dizziness and has been evaluated by her PCP for this; states this episode of dizziness is similar to previous episodes with no changes in quality or severity. No further complaints at this time.     PCP: Julia

## 2019-08-30 NOTE — ED ADULT NURSE NOTE - OBJECTIVE STATEMENT
Pt sent in from urgent care for chest pain. Pt states pain started around three in the morning. denies past cardiac history. denies chest pain/ SOB at this time.

## 2019-08-30 NOTE — ED CDU PROVIDER INITIAL DAY NOTE - NS ED ROS FT
Eyes:  No visual changes, eye pain or discharge.  ENMT:  No hearing changes, pain, discharge or infections. No neck pain or stiffness.  Cardiac: +CP. No SOB or edema. No chest pain with exertion.  Respiratory:  No cough or respiratory distress. No hemoptysis. No history of asthma or RAD.  GI:  No nausea, vomiting, diarrhea or abdominal pain.  :  No dysuria, frequency or burning.  MS:  No myalgia, muscle weakness, joint pain or back pain.  Neuro:  No headache or weakness.  No LOC.  Skin:  No skin rash.   Endocrine: No history of thyroid disease or diabetes.

## 2019-08-31 VITALS
DIASTOLIC BLOOD PRESSURE: 58 MMHG | TEMPERATURE: 97 F | HEART RATE: 69 BPM | RESPIRATION RATE: 18 BRPM | SYSTOLIC BLOOD PRESSURE: 152 MMHG | OXYGEN SATURATION: 94 %

## 2019-08-31 PROCEDURE — 93018 CV STRESS TEST I&R ONLY: CPT

## 2019-08-31 PROCEDURE — 93016 CV STRESS TEST SUPVJ ONLY: CPT

## 2019-08-31 PROCEDURE — 78452 HT MUSCLE IMAGE SPECT MULT: CPT | Mod: 26

## 2019-08-31 PROCEDURE — 99217: CPT

## 2019-08-31 RX ADMIN — CLOPIDOGREL BISULFATE 75 MILLIGRAM(S): 75 TABLET, FILM COATED ORAL at 15:46

## 2019-08-31 RX ADMIN — ADENOSINE 600 MILLIGRAM(S): 3 INJECTION INTRAVENOUS at 11:44

## 2019-08-31 NOTE — ED CDU PROVIDER DISPOSITION NOTE - CARE PROVIDER_API CALL
Shala Moreira)  Internal Medicine  [address] [place] [address]  Phone: [phone]  Fax: [phone]  Follow Up Time:

## 2019-08-31 NOTE — ED CDU PROVIDER SUBSEQUENT DAY NOTE - ATTENDING CONTRIBUTION TO CARE
Patient placed in edou for chest pain protocol, Patient found to have 2 krysten, sets of cardiac enzymes, 2 unremarkable ekg and unremarkable stress test. Patient feels well, requesting to go home, will follow with pmd and private cardiologist.    CONSTITUTIONAL: Well-developed; well-nourished; in no acute distress. Sitting up and providing appropriate history and physical examination  SKIN: skin exam is warm and dry, no acute rash.  HEAD: Normocephalic; atraumatic.  EYES: PERRL, 3 mm bilateral, no nystagmus, EOM intact; conjunctiva and sclera clear.  ENT: No nasal discharge; airway clear.  NECK: Supple; non tender. + full passive ROM in all directions. No JVD  CARD: S1, S2 normal; no murmurs, gallops, or rubs. Regular rate and rhythm. + Symmetric Strong Pulses  RESP: No wheezes, rales or rhonchi. Good air movement bilaterally  ABD: soft; non-distended; non-tender. No Rebound, No Guarding, No signs of peritonitis, No CVA tenderness. No pulsatile abdominal mass. + Strong and Symmetric Pulses  EXT: Normal ROM. No clubbing, cyanosis or edema. Dp and Pt Pulses intact. Cap refill less than 3 seconds  NEURO: CN 2-12 intact, normal finger to nose, normal romberg, stable gait, no sensory or motor deficits, Alert, oriented, grossly unremarkable. No Focal deficits. GCS 15. NIH 0  PSYCH: Cooperative, appropriate.

## 2019-08-31 NOTE — ED CDU PROVIDER DISPOSITION NOTE - CLINICAL COURSE
Patient placed in edou for chest pain protocol, Patient found to have 2 krysten, sets of cardiac enzymes, 2 unremarkable ekg and unremarkable stress test. Patient feels well, requesting to go home, will follow with pmd and private cardiologist. I have fully discussed the medical management and delivery of care with the patient. I have discussed any available labs, imaging and treatment options with the patient. Patient confirms understanding and has been given detailed return precautions. Patient instructed to return to the ED should symptoms persist or worsen. Patient has demonstrated capacity and has verbalized understanding. Patient is well appearing upon discharge.

## 2019-08-31 NOTE — ED CDU PROVIDER DISPOSITION NOTE - PATIENT PORTAL LINK FT
You can access the FollowMyHealth Patient Portal offered by [facility] by registering at the following website: [email or web address]. By joining [employer]’s FollowMyHealth portal, you will also be able to view your health information using other applications (apps) compatible with our system.

## 2019-08-31 NOTE — ED CDU PROVIDER SUBSEQUENT DAY NOTE - PROGRESS NOTE DETAILS
pt. in nad, resting comfortably. will continue to reassess. workup neg, pt given results and f/u with PMD

## 2019-08-31 NOTE — ED CDU PROVIDER DISPOSITION NOTE - NSFOLLOWUPINSTRUCTIONS_ED_ALL_ED_FT

## 2019-08-31 NOTE — ED ADULT NURSE REASSESSMENT NOTE - NS ED NURSE REASSESS COMMENT FT1
Patient received from [facility] .Patient is currently on cardiac moniter .Patient not in any acute distress
Pt reassessed  back from NM stress test done VS stable placed on cardiac monitor ,lunch tray provide did eat 75% ,assistance provide safety precaution on progress ,on going nursing observation .
Received pt from previous RN A/O times 4 Vs stable on cardiac monitor denies chest pain no SOB no N/V no dizziness ,assistance provide ambulate with 1 person guard , safety precaution on progress on going nursing observation .
received pt from day shift RN c/o chest pain .Pt now resting comfortably on bed , no grimaced face noted , no labored breathing noted , AO x 4 , no vomiting , denies nausea, no vomiting , denies headache , no complaints made as of this time , on continuos cardiac monitor , on OBS status
report given to ED nurse , pt no SOB , AO x 4 , denies chest pain , denies dizziness , denies abdominal pain , no labored breathing , denies headache , no vomiting
Pt resting comfortably in bed. Pt A&Ox4. Pt states relief of chest pain. Pt aware of plan of care. Pt had no further questions. Will continue to monitor.

## 2020-06-20 ENCOUNTER — EMERGENCY (EMERGENCY)
Facility: HOSPITAL | Age: 85
LOS: 0 days | Discharge: HOME | End: 2020-06-20
Attending: EMERGENCY MEDICINE | Admitting: EMERGENCY MEDICINE
Payer: MEDICARE

## 2020-06-20 VITALS
OXYGEN SATURATION: 98 % | DIASTOLIC BLOOD PRESSURE: 101 MMHG | HEART RATE: 80 BPM | SYSTOLIC BLOOD PRESSURE: 211 MMHG | RESPIRATION RATE: 18 BRPM | TEMPERATURE: 99 F | WEIGHT: 119.93 LBS | HEIGHT: 62 IN

## 2020-06-20 DIAGNOSIS — E11.9 TYPE 2 DIABETES MELLITUS WITHOUT COMPLICATIONS: ICD-10-CM

## 2020-06-20 DIAGNOSIS — Z90.10 ACQUIRED ABSENCE OF UNSPECIFIED BREAST AND NIPPLE: Chronic | ICD-10-CM

## 2020-06-20 DIAGNOSIS — Z88.5 ALLERGY STATUS TO NARCOTIC AGENT: ICD-10-CM

## 2020-06-20 DIAGNOSIS — Z79.899 OTHER LONG TERM (CURRENT) DRUG THERAPY: ICD-10-CM

## 2020-06-20 DIAGNOSIS — Z85.3 PERSONAL HISTORY OF MALIGNANT NEOPLASM OF BREAST: ICD-10-CM

## 2020-06-20 DIAGNOSIS — I10 ESSENTIAL (PRIMARY) HYPERTENSION: ICD-10-CM

## 2020-06-20 DIAGNOSIS — Z48.01 ENCOUNTER FOR CHANGE OR REMOVAL OF SURGICAL WOUND DRESSING: ICD-10-CM

## 2020-06-20 DIAGNOSIS — Z90.10 ACQUIRED ABSENCE OF UNSPECIFIED BREAST AND NIPPLE: ICD-10-CM

## 2020-06-20 DIAGNOSIS — L98.8 OTHER SPECIFIED DISORDERS OF THE SKIN AND SUBCUTANEOUS TISSUE: ICD-10-CM

## 2020-06-20 PROCEDURE — 99283 EMERGENCY DEPT VISIT LOW MDM: CPT

## 2020-06-20 RX ORDER — CEPHALEXIN 500 MG
1 CAPSULE ORAL
Qty: 28 | Refills: 0
Start: 2020-06-20 | End: 2020-06-26

## 2020-06-20 NOTE — ED PROVIDER NOTE - PROVIDER TOKENS
FREE:[LAST:[Your Dermatologist],PHONE:[(   )    -],FAX:[(   )    -],FOLLOWUP:[1-3 Days]],PROVIDER:[TOKEN:[[id number]]]

## 2020-06-20 NOTE — ED ADULT NURSE NOTE - NSIMPLEMENTINTERV_GEN_ALL_ED
Implemented All Universal Safety Interventions:  [place] to call system. Call bell, personal items and telephone within reach. Instruct patient to call for assistance. Room bathroom lighting operational. Non-slip footwear when patient is off stretcher. Physically safe environment: no spills, clutter or unnecessary equipment. Stretcher in lowest position, wheels locked, appropriate side rails in place.

## 2020-06-20 NOTE — ED PROVIDER NOTE - PATIENT PORTAL LINK FT
You can access the FollowMyHealth Patient Portal offered by [facility] by registering at the following website: [email or web address]. By joining [employer]’s FollowMyHealth portal, you will also be able to view your health information using other applications (apps) compatible with our system.

## 2020-06-20 NOTE — ED PROVIDER NOTE - PROGRESS NOTE DETAILS
ATTENDING NOTE:   86 y/o F to ED c/o leg wound s/p skin biopsy done to r/o skin CA 3 weeks ago. As per pt, wound is unchanging x 3 weeks, only notes mild redness and drainage of clear liquid as well as intermittent pain. Today neighbor who is a nurse noticed and concern for infection so pt came to ED. No f/c, numbness/tingling, or streaking.   AVSS, exam as noted, CTAB, RRR, abdomen soft NTND, (+) bowel sounds, (+)2x2 cm, punched out lesion with clear serous drainage and central granulation tissue with mild redness around margins, neuro nonfocal.

## 2020-06-20 NOTE — ED PROVIDER NOTE - PHYSICAL EXAMINATION
GEN: Alert & Oriented x 3, No acute distress. Calm, appropriate.  Head and Neck: Normocephalic, atraumatic.  RESP: Lungs clear to auscult bilat. no wheezes, rhonchi or rales. No retractions. Equal air entry.  CARDIO: regular rate and rhythm, no murmurs, rubs or gallops. Normal S1, S2. No JVD or hepatojugular reflex noted. distal pedal pulses intact.  MS: slight tenderness with palpation around lesion.   SKIN: quarter sized lesion to right mid-shin with some erythema surrounding lesion and clear drainage.   NEURO: CN II-XII grossly intact. Strength + sensation intact x 4 extremities. Speech and cognition normal.

## 2020-06-20 NOTE — ED PROVIDER NOTE - CARE PROVIDER_API CALL
Your Dermatologist,   Phone: (   )    -  Fax: (   )    -  Follow Up Time: 1-3 Days    Shar Mcclure  Dermatology  [address] [place] [address]  Phone: [phone]  Fax: [phone]  Follow Up Time:

## 2020-06-20 NOTE — ED PROVIDER NOTE - NS ED ROS FT
GEN: (-) fever, (-) chills, (-) malaise  NEURO: (-) weakness, (-) paresthesias  MS: (-) back pain, (-) joint pain, (-)myalgias, (-) swelling  SKIN: (-) rashes, (+) new lesions  HEME: (-) bleeding, (-) ecchymosis

## 2020-06-20 NOTE — ED PROVIDER NOTE - NSFOLLOWUPINSTRUCTIONS_ED_ALL_ED_FT
Wound Infection  A wound infection happens when germs start to grow in a wound. Germs that cause wound infections are most often bacteria. Other types of infections can occur as well. An infection can cause the wound to break open. Wound infections need treatment. If a wound infection is not treated, problems can happen.  What are the causes?  Most often caused by germs (bacteria) that grow in a wound.Other germs, such as yeast and funguses, can also cause wound infections.What increases the risk?  Having a weak body defense system (immune system).Having diabetes.Taking certain medicines (steroids) for a long time.Smoking.Being an older person.Being overweight.Taking certain medicines for cancer treatment.What are the signs or symptoms?  Having more redness, swelling, or pain at the wound site.Having more blood or fluid at the wound site.A bad smell coming from a wound or bandage (dressing).Having a fever.Feeling very tired.Having warmth at or around the wound.Having pus at the wound site.How is this treated?  This condition is most often treated with an antibiotic medicine.  The infection should improve 24–48 hours after you start antibiotics.After 24–48 hours, redness around the wound should stop spreading. The wound should also be less painful.Follow these instructions at home:  Medicines     Take or apply over-the-counter and prescription medicines only as told by your doctor.If you were prescribed an antibiotic medicine, take or apply it as told by your doctor. Do not stop using the antibiotic even if you start to feel better.Wound care        Clean the wound each day, or as told by your doctor.  Wash the wound with mild soap and water.Rinse the wound with water to remove all soap.Pat the wound dry with a clean towel. Do not rub it.Follow instructions from your doctor about how to take care of your wound. Make sure you:  Wash your hands with soap and water before and after you change your bandage. If you cannot use soap and water, use hand .Change your bandage as told by your doctor.Leave stitches (sutures), skin glue, or skin tape (adhesive) strips in place if your wound has been closed. They may need to stay in place for 2 weeks or longer. If tape strips get loose and curl up, you may trim the loose edges. Do not remove tape strips completely unless your doctor says it is okay. Some wounds are left open to heal on their own.Check your wound every day for signs of infection. Watch for:  More redness, swelling, or pain.More fluid or blood.Warmth.Pus or a bad smell.General instructions     Keep the bandage dry until your doctor says it can be removed.Do not take baths, swim, or use a hot tub until your doctor approves. Ask your doctor if you may take showers. You may only be allowed to take sponge baths.Raise (elevate) the injured area above the level of your heart while you are sitting or lying down.Do not scratch or pick at the wound.Keep all follow-up visits as told by your doctor. This is important.Contact a doctor if:  Medicine does not help your pain.You have more redness, swelling, or pain around your wound.You have more fluid or blood coming from your wound.Your wound feels warm to the touch.You have pus coming from your wound.You notice a bad smell coming from your wound or your bandage.Your wound that was closed breaks open.Get help right away if:  You have a red streak going away from your wound.You have a fever.Summary  A wound infection happens when germs start to grow in a wound.This condition is usually treated with an antibiotic medicine.Follow instructions from your doctor about how to take care of your wound.Contact a doctor if your wound infection does not start to get better in 24–48 hours, or your symptoms get worse.Keep all follow-up visits as told by your doctor. This is important.This information is not intended to replace advice given to you by your health care provider. Make sure you discuss any questions you have with your health care provider.

## 2020-06-20 NOTE — ED PROVIDER NOTE - OBJECTIVE STATEMENT
The pt is a 85y F with PMH CVA, HTN, DM, vertigo, hx Breast ca is presenting to ED for wound check. Pt states she had a skin biopsy 3 wks ago and since then the wound hasn't healed. She states there hasn't been any change to the wound, but that there has been some redness around the lesion and clear drainage from the wound. Pt denies f/c, joint pain, myalgias, swelling, purulent drainage, red streaking, paresthesias.

## 2021-12-18 NOTE — H&P ADULT - I WAS PHYSICALLY PRESENT FOR THE KEY PORTIONS OF THE EVALUATION AND MANAGEMENT (E/M) SERVICE PROVIDED.  I AGREE WITH THE ABOVE HISTORY, PHYSICAL, AND PLAN WHICH I HAVE REVIEWED AND EDITED WHERE APPROPRIATE
Bed: 16  Expected date:   Expected time:   Means of arrival:   Comments:  Fall/AMS   Statement Selected

## 2022-04-10 ENCOUNTER — TRANSCRIPTION ENCOUNTER (OUTPATIENT)
Age: 87
End: 2022-04-10

## 2022-10-01 ENCOUNTER — EMERGENCY (EMERGENCY)
Facility: HOSPITAL | Age: 87
LOS: 0 days | Discharge: HOME | End: 2022-10-01
Attending: EMERGENCY MEDICINE | Admitting: EMERGENCY MEDICINE

## 2022-10-01 VITALS
OXYGEN SATURATION: 97 % | HEART RATE: 84 BPM | HEIGHT: 62 IN | SYSTOLIC BLOOD PRESSURE: 193 MMHG | WEIGHT: 119.93 LBS | TEMPERATURE: 99 F | RESPIRATION RATE: 20 BRPM | DIASTOLIC BLOOD PRESSURE: 86 MMHG

## 2022-10-01 DIAGNOSIS — I10 ESSENTIAL (PRIMARY) HYPERTENSION: ICD-10-CM

## 2022-10-01 DIAGNOSIS — Z79.82 LONG TERM (CURRENT) USE OF ASPIRIN: ICD-10-CM

## 2022-10-01 DIAGNOSIS — R42 DIZZINESS AND GIDDINESS: ICD-10-CM

## 2022-10-01 DIAGNOSIS — Z79.84 LONG TERM (CURRENT) USE OF ORAL HYPOGLYCEMIC DRUGS: ICD-10-CM

## 2022-10-01 DIAGNOSIS — I25.10 ATHEROSCLEROTIC HEART DISEASE OF NATIVE CORONARY ARTERY WITHOUT ANGINA PECTORIS: ICD-10-CM

## 2022-10-01 DIAGNOSIS — E11.9 TYPE 2 DIABETES MELLITUS WITHOUT COMPLICATIONS: ICD-10-CM

## 2022-10-01 DIAGNOSIS — Z88.5 ALLERGY STATUS TO NARCOTIC AGENT: ICD-10-CM

## 2022-10-01 DIAGNOSIS — E78.5 HYPERLIPIDEMIA, UNSPECIFIED: ICD-10-CM

## 2022-10-01 DIAGNOSIS — Z95.5 PRESENCE OF CORONARY ANGIOPLASTY IMPLANT AND GRAFT: ICD-10-CM

## 2022-10-01 DIAGNOSIS — Z86.73 PERSONAL HISTORY OF TRANSIENT ISCHEMIC ATTACK (TIA), AND CEREBRAL INFARCTION WITHOUT RESIDUAL DEFICITS: ICD-10-CM

## 2022-10-01 DIAGNOSIS — Z20.822 CONTACT WITH AND (SUSPECTED) EXPOSURE TO COVID-19: ICD-10-CM

## 2022-10-01 DIAGNOSIS — Z90.10 ACQUIRED ABSENCE OF UNSPECIFIED BREAST AND NIPPLE: Chronic | ICD-10-CM

## 2022-10-01 DIAGNOSIS — Z79.02 LONG TERM (CURRENT) USE OF ANTITHROMBOTICS/ANTIPLATELETS: ICD-10-CM

## 2022-10-01 LAB
ALBUMIN SERPL ELPH-MCNC: 4.6 G/DL — SIGNIFICANT CHANGE UP (ref 3.5–5.2)
ALP SERPL-CCNC: 60 U/L — SIGNIFICANT CHANGE UP (ref 30–115)
ALT FLD-CCNC: 25 U/L — SIGNIFICANT CHANGE UP (ref 0–41)
ANION GAP SERPL CALC-SCNC: 10 MMOL/L — SIGNIFICANT CHANGE UP (ref 7–14)
AST SERPL-CCNC: 47 U/L — HIGH (ref 0–41)
BASOPHILS # BLD AUTO: 0.03 K/UL — SIGNIFICANT CHANGE UP (ref 0–0.2)
BASOPHILS NFR BLD AUTO: 0.8 % — SIGNIFICANT CHANGE UP (ref 0–1)
BILIRUB SERPL-MCNC: 0.4 MG/DL — SIGNIFICANT CHANGE UP (ref 0.2–1.2)
BUN SERPL-MCNC: 13 MG/DL — SIGNIFICANT CHANGE UP (ref 10–20)
CALCIUM SERPL-MCNC: 9.6 MG/DL — SIGNIFICANT CHANGE UP (ref 8.4–10.5)
CHLORIDE SERPL-SCNC: 102 MMOL/L — SIGNIFICANT CHANGE UP (ref 98–110)
CO2 SERPL-SCNC: 25 MMOL/L — SIGNIFICANT CHANGE UP (ref 17–32)
CREAT SERPL-MCNC: 0.9 MG/DL — SIGNIFICANT CHANGE UP (ref 0.7–1.5)
EGFR: 62 ML/MIN/1.73M2 — SIGNIFICANT CHANGE UP
EOSINOPHIL # BLD AUTO: 0.03 K/UL — SIGNIFICANT CHANGE UP (ref 0–0.7)
EOSINOPHIL NFR BLD AUTO: 0.8 % — SIGNIFICANT CHANGE UP (ref 0–8)
GLUCOSE SERPL-MCNC: 181 MG/DL — HIGH (ref 70–99)
HCT VFR BLD CALC: 35 % — LOW (ref 37–47)
HGB BLD-MCNC: 11.5 G/DL — LOW (ref 12–16)
IMM GRANULOCYTES NFR BLD AUTO: 0.3 % — SIGNIFICANT CHANGE UP (ref 0.1–0.3)
LYMPHOCYTES # BLD AUTO: 0.56 K/UL — LOW (ref 1.2–3.4)
LYMPHOCYTES # BLD AUTO: 15.1 % — LOW (ref 20.5–51.1)
MAGNESIUM SERPL-MCNC: 1.7 MG/DL — LOW (ref 1.8–2.4)
MCHC RBC-ENTMCNC: 27.4 PG — SIGNIFICANT CHANGE UP (ref 27–31)
MCHC RBC-ENTMCNC: 32.9 G/DL — SIGNIFICANT CHANGE UP (ref 32–37)
MCV RBC AUTO: 83.5 FL — SIGNIFICANT CHANGE UP (ref 81–99)
MONOCYTES # BLD AUTO: 0.37 K/UL — SIGNIFICANT CHANGE UP (ref 0.1–0.6)
MONOCYTES NFR BLD AUTO: 10 % — HIGH (ref 1.7–9.3)
NEUTROPHILS # BLD AUTO: 2.71 K/UL — SIGNIFICANT CHANGE UP (ref 1.4–6.5)
NEUTROPHILS NFR BLD AUTO: 73 % — SIGNIFICANT CHANGE UP (ref 42.2–75.2)
NRBC # BLD: 0 /100 WBCS — SIGNIFICANT CHANGE UP (ref 0–0)
NT-PROBNP SERPL-SCNC: 245 PG/ML — SIGNIFICANT CHANGE UP (ref 0–300)
PLATELET # BLD AUTO: 139 K/UL — SIGNIFICANT CHANGE UP (ref 130–400)
POTASSIUM SERPL-MCNC: 5.2 MMOL/L — HIGH (ref 3.5–5)
POTASSIUM SERPL-SCNC: 5.2 MMOL/L — HIGH (ref 3.5–5)
PROT SERPL-MCNC: 7 G/DL — SIGNIFICANT CHANGE UP (ref 6–8)
RBC # BLD: 4.19 M/UL — LOW (ref 4.2–5.4)
RBC # FLD: 13.5 % — SIGNIFICANT CHANGE UP (ref 11.5–14.5)
SARS-COV-2 RNA SPEC QL NAA+PROBE: SIGNIFICANT CHANGE UP
SODIUM SERPL-SCNC: 137 MMOL/L — SIGNIFICANT CHANGE UP (ref 135–146)
TROPONIN T SERPL-MCNC: <0.01 NG/ML — SIGNIFICANT CHANGE UP
WBC # BLD: 3.71 K/UL — LOW (ref 4.8–10.8)
WBC # FLD AUTO: 3.71 K/UL — LOW (ref 4.8–10.8)

## 2022-10-01 PROCEDURE — 93010 ELECTROCARDIOGRAM REPORT: CPT

## 2022-10-01 PROCEDURE — 99285 EMERGENCY DEPT VISIT HI MDM: CPT | Mod: 25

## 2022-10-01 PROCEDURE — 71045 X-RAY EXAM CHEST 1 VIEW: CPT | Mod: 26

## 2022-10-01 PROCEDURE — 70450 CT HEAD/BRAIN W/O DYE: CPT | Mod: 26,MA

## 2022-10-01 PROCEDURE — 36000 PLACE NEEDLE IN VEIN: CPT

## 2022-10-01 RX ORDER — MECLIZINE HCL 12.5 MG
1 TABLET ORAL
Qty: 21 | Refills: 0
Start: 2022-10-01 | End: 2022-10-07

## 2022-10-01 RX ORDER — MAGNESIUM OXIDE 400 MG ORAL TABLET 241.3 MG
400 TABLET ORAL ONCE
Refills: 0 | Status: DISCONTINUED | OUTPATIENT
Start: 2022-10-01 | End: 2022-10-01

## 2022-10-01 RX ORDER — METOCLOPRAMIDE HCL 10 MG
10 TABLET ORAL ONCE
Refills: 0 | Status: COMPLETED | OUTPATIENT
Start: 2022-10-01 | End: 2022-10-01

## 2022-10-01 RX ORDER — MECLIZINE HCL 12.5 MG
50 TABLET ORAL ONCE
Refills: 0 | Status: COMPLETED | OUTPATIENT
Start: 2022-10-01 | End: 2022-10-01

## 2022-10-01 RX ADMIN — Medication 104 MILLIGRAM(S): at 17:18

## 2022-10-01 RX ADMIN — Medication 50 MILLIGRAM(S): at 17:17

## 2022-10-01 NOTE — ED PROVIDER NOTE - CLINICAL SUMMARY MEDICAL DECISION MAKING FREE TEXT BOX
87-year-old female history of hypertension diabetes vertigo CAD PCI prior CVA presenting for evaluation of dizziness.  Patient states states that she chronically has dizziness/room spinning sensation and that it intermittently becomes worse.  States that she has noticed it becoming worse recently.  Also has chronic left upper extremity numbness/weakness, unchanged from baseline.  No vision changes.  Patient states that dizziness is worse on head movement.  No nausea or vomiting.  No other numbness or focal weakness.  Well appearing, NAD, non toxic. NCAT PERRLA EOMI neck supple non tender normal wob cta bl rrr abdomen s nt nd no rebound no guarding WWPx4 neuro non focal.  Labs EKG imaging reviewed.  Patient feeling improved.  Aware of all results, given a copy of all available results, comfortable with discharge and follow-up outpatient, strict return precautions given. Endorses understanding and aware they can return at any time for further evaluation. No further questions or concerns at this time.

## 2022-10-01 NOTE — ED PROVIDER NOTE - PHYSICAL EXAMINATION
Physical Exam    Vital Signs: I have reviewed the initial vital signs.  Constitutional: appears stated age, no acute distress  Eyes: Conjunctiva pink, Sclera clear  Cardiovascular: S1 and S2, regular rate, regular rhythm, well-perfused extremities, radial pulses equal and 2+, pedal pulses 2+ and equal  Respiratory: unlabored respiratory effort, clear to auscultation bilaterally no wheezing, rales and rhonchi  Gastrointestinal: soft, non-tender abdomen, no pulsatile mass, normal bowl sounds  Musculoskeletal: supple neck, no lower extremity edema, no midline tenderness  Integumentary: warm, dry, no rash  Neurologic: awake, alert, cranial nerves II-XII grossly intact, extremities’ motor and sensory functions grossly intact, normal speech, no pronator drift, finger to nose intact.

## 2022-10-01 NOTE — ED PROVIDER NOTE - PATIENT PORTAL LINK FT
You can access the FollowMyHealth Patient Portal offered by [facility] by registering at the following website: [email or web address]. By joining [employer]’s FollowMyHealth portal, you will also be able to view your health information using other applications (apps) compatible with our system.

## 2022-10-01 NOTE — ED PROVIDER NOTE - CARE PROVIDER_API CALL
Leonel Henry)  Neuromuscular Medicine  [address] [place] [address], [address]  [place], [place] [id number]  Phone: [phone]  Fax: [phone]  Follow Up Time:

## 2022-10-01 NOTE — ED PROCEDURE NOTE - CPROC ED NUMBER OF ATTEMPTS1
1
no dysuria/no hematuria/no diarrhea/no abdominal distension/no blood in stool/no burning urination/no chills/no fever

## 2022-10-01 NOTE — ED ADULT NURSE NOTE - NSIMPLEMENTINTERV_GEN_ALL_ED
Implemented All Fall Risk Interventions:  [place] to call system. Call bell, personal items and telephone within reach. Instruct patient to call for assistance. Room bathroom lighting operational. Non-slip footwear when patient is off stretcher. Physically safe environment: no spills, clutter or unnecessary equipment. Stretcher in lowest position, wheels locked, appropriate side rails in place. Provide visual cue, wrist band, yellow gown, etc. Monitor gait and stability. Monitor for mental status changes and reorient to person, place, and time. Review medications for side effects contributing to fall risk. Reinforce activity limits and safety measures with patient and family.

## 2022-10-01 NOTE — ED PROVIDER NOTE - OBJECTIVE STATEMENT
88 yo female, pmh of htn, hld, cva on asa, cad stent on plavix, presents to ed for dizziness, chronic, room spinning, mild, no pain or radiation. Denies fever, chils, cp, sob, numbness, tingling, visual changes, nvd.

## 2023-06-14 ENCOUNTER — INPATIENT (INPATIENT)
Facility: HOSPITAL | Age: 88
LOS: 1 days | Discharge: HOME | DRG: 69 | End: 2023-06-16
Attending: INTERNAL MEDICINE | Admitting: HOSPITALIST
Payer: MEDICARE

## 2023-06-14 VITALS
SYSTOLIC BLOOD PRESSURE: 186 MMHG | RESPIRATION RATE: 18 BRPM | OXYGEN SATURATION: 100 % | DIASTOLIC BLOOD PRESSURE: 84 MMHG | HEART RATE: 84 BPM

## 2023-06-14 DIAGNOSIS — Z90.10 ACQUIRED ABSENCE OF UNSPECIFIED BREAST AND NIPPLE: Chronic | ICD-10-CM

## 2023-06-14 DIAGNOSIS — R42 DIZZINESS AND GIDDINESS: ICD-10-CM

## 2023-06-14 LAB
ALBUMIN SERPL ELPH-MCNC: 4.2 G/DL — SIGNIFICANT CHANGE UP (ref 3.5–5.2)
ALP SERPL-CCNC: 53 U/L — SIGNIFICANT CHANGE UP (ref 30–115)
ALT FLD-CCNC: 13 U/L — SIGNIFICANT CHANGE UP (ref 0–41)
ANION GAP SERPL CALC-SCNC: 10 MMOL/L — SIGNIFICANT CHANGE UP (ref 7–14)
APPEARANCE UR: CLEAR — SIGNIFICANT CHANGE UP
APTT BLD: 29.1 SEC — SIGNIFICANT CHANGE UP (ref 27–39.2)
AST SERPL-CCNC: 18 U/L — SIGNIFICANT CHANGE UP (ref 0–41)
BACTERIA # UR AUTO: ABNORMAL /HPF
BASOPHILS # BLD AUTO: 0.02 K/UL — SIGNIFICANT CHANGE UP (ref 0–0.2)
BASOPHILS NFR BLD AUTO: 0.3 % — SIGNIFICANT CHANGE UP (ref 0–1)
BILIRUB SERPL-MCNC: 0.3 MG/DL — SIGNIFICANT CHANGE UP (ref 0.2–1.2)
BILIRUB UR-MCNC: NEGATIVE — SIGNIFICANT CHANGE UP
BUN SERPL-MCNC: 17 MG/DL — SIGNIFICANT CHANGE UP (ref 10–20)
CALCIUM SERPL-MCNC: 9.3 MG/DL — SIGNIFICANT CHANGE UP (ref 8.4–10.5)
CHLORIDE SERPL-SCNC: 99 MMOL/L — SIGNIFICANT CHANGE UP (ref 98–110)
CO2 SERPL-SCNC: 26 MMOL/L — SIGNIFICANT CHANGE UP (ref 17–32)
COLOR SPEC: YELLOW — SIGNIFICANT CHANGE UP
CREAT SERPL-MCNC: 0.9 MG/DL — SIGNIFICANT CHANGE UP (ref 0.7–1.5)
DIFF PNL FLD: ABNORMAL
EGFR: 61 ML/MIN/1.73M2 — SIGNIFICANT CHANGE UP
EOSINOPHIL # BLD AUTO: 0.01 K/UL — SIGNIFICANT CHANGE UP (ref 0–0.7)
EOSINOPHIL NFR BLD AUTO: 0.1 % — SIGNIFICANT CHANGE UP (ref 0–8)
EPI CELLS # UR: ABNORMAL /HPF (ref 0–5)
GLUCOSE SERPL-MCNC: 154 MG/DL — HIGH (ref 70–99)
GLUCOSE UR QL: NEGATIVE MG/DL — SIGNIFICANT CHANGE UP
HCT VFR BLD CALC: 35.6 % — LOW (ref 37–47)
HGB BLD-MCNC: 11.4 G/DL — LOW (ref 12–16)
IMM GRANULOCYTES NFR BLD AUTO: 0.1 % — SIGNIFICANT CHANGE UP (ref 0.1–0.3)
INR BLD: 1.05 RATIO — SIGNIFICANT CHANGE UP (ref 0.65–1.3)
KETONES UR-MCNC: NEGATIVE — SIGNIFICANT CHANGE UP
LEUKOCYTE ESTERASE UR-ACNC: NEGATIVE — SIGNIFICANT CHANGE UP
LYMPHOCYTES # BLD AUTO: 0.61 K/UL — LOW (ref 1.2–3.4)
LYMPHOCYTES # BLD AUTO: 8.4 % — LOW (ref 20.5–51.1)
MCHC RBC-ENTMCNC: 27 PG — SIGNIFICANT CHANGE UP (ref 27–31)
MCHC RBC-ENTMCNC: 32 G/DL — SIGNIFICANT CHANGE UP (ref 32–37)
MCV RBC AUTO: 84.4 FL — SIGNIFICANT CHANGE UP (ref 81–99)
MONOCYTES # BLD AUTO: 0.34 K/UL — SIGNIFICANT CHANGE UP (ref 0.1–0.6)
MONOCYTES NFR BLD AUTO: 4.7 % — SIGNIFICANT CHANGE UP (ref 1.7–9.3)
NEUTROPHILS # BLD AUTO: 6.28 K/UL — SIGNIFICANT CHANGE UP (ref 1.4–6.5)
NEUTROPHILS NFR BLD AUTO: 86.4 % — HIGH (ref 42.2–75.2)
NITRITE UR-MCNC: NEGATIVE — SIGNIFICANT CHANGE UP
NRBC # BLD: 0 /100 WBCS — SIGNIFICANT CHANGE UP (ref 0–0)
PH UR: 7 — SIGNIFICANT CHANGE UP (ref 5–8)
PLATELET # BLD AUTO: 154 K/UL — SIGNIFICANT CHANGE UP (ref 130–400)
PMV BLD: 8.3 FL — SIGNIFICANT CHANGE UP (ref 7.4–10.4)
POTASSIUM SERPL-MCNC: 4.4 MMOL/L — SIGNIFICANT CHANGE UP (ref 3.5–5)
POTASSIUM SERPL-SCNC: 4.4 MMOL/L — SIGNIFICANT CHANGE UP (ref 3.5–5)
PROT SERPL-MCNC: 6.3 G/DL — SIGNIFICANT CHANGE UP (ref 6–8)
PROT UR-MCNC: ABNORMAL MG/DL
PROTHROM AB SERPL-ACNC: 12 SEC — SIGNIFICANT CHANGE UP (ref 9.95–12.87)
RBC # BLD: 4.22 M/UL — SIGNIFICANT CHANGE UP (ref 4.2–5.4)
RBC # FLD: 13.2 % — SIGNIFICANT CHANGE UP (ref 11.5–14.5)
RBC CASTS # UR COMP ASSIST: SIGNIFICANT CHANGE UP /HPF (ref 0–4)
SODIUM SERPL-SCNC: 135 MMOL/L — SIGNIFICANT CHANGE UP (ref 135–146)
SP GR SPEC: 1.01 — SIGNIFICANT CHANGE UP (ref 1.01–1.03)
TROPONIN T SERPL-MCNC: <0.01 NG/ML — SIGNIFICANT CHANGE UP
UROBILINOGEN FLD QL: 0.2 MG/DL — SIGNIFICANT CHANGE UP
WBC # BLD: 7.27 K/UL — SIGNIFICANT CHANGE UP (ref 4.8–10.8)
WBC # FLD AUTO: 7.27 K/UL — SIGNIFICANT CHANGE UP (ref 4.8–10.8)
WBC UR QL: SIGNIFICANT CHANGE UP /HPF (ref 0–5)

## 2023-06-14 PROCEDURE — 36415 COLL VENOUS BLD VENIPUNCTURE: CPT

## 2023-06-14 PROCEDURE — 92610 EVALUATE SWALLOWING FUNCTION: CPT | Mod: GN

## 2023-06-14 PROCEDURE — 84100 ASSAY OF PHOSPHORUS: CPT

## 2023-06-14 PROCEDURE — 70498 CT ANGIOGRAPHY NECK: CPT | Mod: 26,MA

## 2023-06-14 PROCEDURE — 85027 COMPLETE CBC AUTOMATED: CPT

## 2023-06-14 PROCEDURE — 97116 GAIT TRAINING THERAPY: CPT | Mod: GP

## 2023-06-14 PROCEDURE — 97162 PT EVAL MOD COMPLEX 30 MIN: CPT | Mod: GP

## 2023-06-14 PROCEDURE — 84443 ASSAY THYROID STIM HORMONE: CPT

## 2023-06-14 PROCEDURE — 97110 THERAPEUTIC EXERCISES: CPT | Mod: GP

## 2023-06-14 PROCEDURE — 80061 LIPID PANEL: CPT

## 2023-06-14 PROCEDURE — 93306 TTE W/DOPPLER COMPLETE: CPT

## 2023-06-14 PROCEDURE — 83036 HEMOGLOBIN GLYCOSYLATED A1C: CPT

## 2023-06-14 PROCEDURE — 0042T: CPT | Mod: MA

## 2023-06-14 PROCEDURE — 82962 GLUCOSE BLOOD TEST: CPT

## 2023-06-14 PROCEDURE — 70551 MRI BRAIN STEM W/O DYE: CPT

## 2023-06-14 PROCEDURE — 99285 EMERGENCY DEPT VISIT HI MDM: CPT

## 2023-06-14 PROCEDURE — 70496 CT ANGIOGRAPHY HEAD: CPT | Mod: 26,MA

## 2023-06-14 PROCEDURE — 83735 ASSAY OF MAGNESIUM: CPT

## 2023-06-14 PROCEDURE — 92523 SPEECH SOUND LANG COMPREHEN: CPT | Mod: GN

## 2023-06-14 PROCEDURE — 80053 COMPREHEN METABOLIC PANEL: CPT

## 2023-06-14 PROCEDURE — 97165 OT EVAL LOW COMPLEX 30 MIN: CPT | Mod: GO

## 2023-06-14 RX ORDER — SODIUM CHLORIDE 9 MG/ML
1000 INJECTION, SOLUTION INTRAVENOUS
Refills: 0 | Status: DISCONTINUED | OUTPATIENT
Start: 2023-06-14 | End: 2023-06-16

## 2023-06-14 RX ORDER — LISINOPRIL 2.5 MG/1
5 TABLET ORAL DAILY
Refills: 0 | Status: DISCONTINUED | OUTPATIENT
Start: 2023-06-14 | End: 2023-06-16

## 2023-06-14 RX ORDER — DEXTROSE 50 % IN WATER 50 %
25 SYRINGE (ML) INTRAVENOUS ONCE
Refills: 0 | Status: DISCONTINUED | OUTPATIENT
Start: 2023-06-14 | End: 2023-06-16

## 2023-06-14 RX ORDER — MECLIZINE HCL 12.5 MG
25 TABLET ORAL EVERY 8 HOURS
Refills: 0 | Status: DISCONTINUED | OUTPATIENT
Start: 2023-06-14 | End: 2023-06-16

## 2023-06-14 RX ORDER — DEXTROSE 50 % IN WATER 50 %
12.5 SYRINGE (ML) INTRAVENOUS ONCE
Refills: 0 | Status: DISCONTINUED | OUTPATIENT
Start: 2023-06-14 | End: 2023-06-16

## 2023-06-14 RX ORDER — DEXTROSE 50 % IN WATER 50 %
15 SYRINGE (ML) INTRAVENOUS ONCE
Refills: 0 | Status: DISCONTINUED | OUTPATIENT
Start: 2023-06-14 | End: 2023-06-16

## 2023-06-14 RX ORDER — HEPARIN SODIUM 5000 [USP'U]/ML
5000 INJECTION INTRAVENOUS; SUBCUTANEOUS EVERY 12 HOURS
Refills: 0 | Status: DISCONTINUED | OUTPATIENT
Start: 2023-06-14 | End: 2023-06-16

## 2023-06-14 RX ORDER — CLOPIDOGREL BISULFATE 75 MG/1
75 TABLET, FILM COATED ORAL DAILY
Refills: 0 | Status: DISCONTINUED | OUTPATIENT
Start: 2023-06-14 | End: 2023-06-16

## 2023-06-14 RX ORDER — MECLIZINE HCL 12.5 MG
50 TABLET ORAL ONCE
Refills: 0 | Status: COMPLETED | OUTPATIENT
Start: 2023-06-14 | End: 2023-06-14

## 2023-06-14 RX ORDER — GLUCAGON INJECTION, SOLUTION 0.5 MG/.1ML
1 INJECTION, SOLUTION SUBCUTANEOUS ONCE
Refills: 0 | Status: DISCONTINUED | OUTPATIENT
Start: 2023-06-14 | End: 2023-06-16

## 2023-06-14 RX ORDER — INSULIN LISPRO 100/ML
VIAL (ML) SUBCUTANEOUS AT BEDTIME
Refills: 0 | Status: DISCONTINUED | OUTPATIENT
Start: 2023-06-14 | End: 2023-06-16

## 2023-06-14 RX ORDER — ASPIRIN/CALCIUM CARB/MAGNESIUM 324 MG
81 TABLET ORAL DAILY
Refills: 0 | Status: DISCONTINUED | OUTPATIENT
Start: 2023-06-14 | End: 2023-06-16

## 2023-06-14 RX ORDER — INSULIN LISPRO 100/ML
VIAL (ML) SUBCUTANEOUS
Refills: 0 | Status: DISCONTINUED | OUTPATIENT
Start: 2023-06-14 | End: 2023-06-16

## 2023-06-14 RX ORDER — ATORVASTATIN CALCIUM 80 MG/1
80 TABLET, FILM COATED ORAL AT BEDTIME
Refills: 0 | Status: DISCONTINUED | OUTPATIENT
Start: 2023-06-14 | End: 2023-06-16

## 2023-06-14 RX ADMIN — ATORVASTATIN CALCIUM 80 MILLIGRAM(S): 80 TABLET, FILM COATED ORAL at 22:16

## 2023-06-14 RX ADMIN — Medication 50 MILLIGRAM(S): at 15:00

## 2023-06-14 RX ADMIN — HEPARIN SODIUM 5000 UNIT(S): 5000 INJECTION INTRAVENOUS; SUBCUTANEOUS at 18:31

## 2023-06-14 NOTE — ED PROVIDER NOTE - ATTENDING APP SHARED VISIT CONTRIBUTION OF CARE
I have personally performed a history and physical exam on this patient and personally directed the management of the patient. Patient is a 80-year-old female presents for evaluation of dizziness has a past medical history of hypertension hyperlipidemia prior history of CVAs patient on aspirin brought in for evaluation of dizziness states his room spinning upon awakening last known well time was described as bedtime the night before as she was on a telephone conversation with her daughter-in-law which is normal at that time patient denies any other symptoms at this time denies headache visual changes chest pain shortness of breath numbness tingling of extremities or any weakness    On physical exam patient is normocephalic atraumatic pupils equal round react light accommodation extraocular muscles intact oropharynx clear chest clear to auscultation bilaterally abdomen soft nontender nondistended bowel sounds positive no guarding no rebound extremities full range of motion    Neurologic patient has intact strength 5 out of 5 normal sensation no focal deficits noted    Assessment plan given this patient's symptoms we  activated stroke code upon arrival however given this patient's time course that she is passed window for TNK in addition nihss 0 full strength full sensation at this time we obtained EKG per my independent evaluation is not consistent with STEMI we obtained labs which is negative nevertheless we obtained a CT CT head and neck which is negative we discussed case with neurology advised admit to every 4 neurochecks discussed case with ICU who agrees with plan of care

## 2023-06-14 NOTE — H&P ADULT - NSHPREVIEWOFSYSTEMS_GEN_ALL_CORE
REVIEW OF SYSTEMS:    CONSTITUTIONAL:  No weakness, fevers or chills  EYES/ENT:  No visual changes;  No vertigo or throat pain   NECK:  No pain or stiffness  RESPIRATORY:  No cough, wheezing, hemoptysis; No shortness of breath  CARDIOVASCULAR:  No chest pain or palpitations  GASTROINTESTINAL:  No abdominal or epigastric pain. No nausea, vomiting, or hematemesis; No diarrhea or constipation. No melena or hematochezia.  GENITOURINARY:  No dysuria, frequency or hematuria  NEUROLOGICAL: Dizziness/ slurred speech this AM as above. No past history of similar symptoms. No weakness in Ext x 4   SKIN:  No itching, rashes

## 2023-06-14 NOTE — PATIENT PROFILE ADULT - PATIENT'S SEXUAL ORIENTATION
This is a Refill request from: [facility]  Name of Medication and Dose:  Trulicity 1.5mg/0.5mL, Mobic 15 mg  Quantity requested:  90 day supply   Last fill date: 5/3/18, 3/5/18  Last Office Visit: 3/12/18  PCP:  Adebayo Dubois MD  Controlled Substance Agreement: N/A  Associated Diagnosis: Uncontrolled Type 2 diabetes/ Displacement of lumbar intervertebral disc without myelopathy      Denice Camargo LPN ....................  5/31/2018   4:22 PM        
Heterosexual

## 2023-06-14 NOTE — H&P ADULT - NSHPPHYSICALEXAM_GEN_ALL_CORE
ICU Vital Signs Last 24 Hrs  T(C): 36.2 (14 Jun 2023 11:17), Max: 36.2 (14 Jun 2023 11:09)  T(F): 97.2 (14 Jun 2023 11:17), Max: 97.2 (14 Jun 2023 11:09)  HR: 98 (14 Jun 2023 16:09) (56 - 103)  BP: 156/68 (14 Jun 2023 16:09) (139/58 - 186/84)  BP(mean): --  ABP: --  ABP(mean): --  RR: 18 (14 Jun 2023 16:09) (18 - 18)  SpO2: 98% (14 Jun 2023 16:09) (96% - 100%)    O2 Parameters below as of 14 Jun 2023 16:09  Patient On (Oxygen Delivery Method): room air    PHYSICAL EXAM:    Constitutional: NAD, A + O x 3. Breathing comfortably on room air,     Eyes: PEERL, EOM intact b/l.     Neck: Supple, non-tender, trachea midline.     Respiratory: CTA b/l. No rhonchi/ rales/ wheeze.     Cardiovascular: S1/ S2 present and clear, no murmur, gallops, or rub.     Gastrointestinal: Abd soft + non-tender. BS NA x 4 quadrants.     Extremities: Strength slightly diminished on LUE (Patient states that this is her baseline). RUE/LE b/l with strength 5/5 + FROM, no drift/ dysmetria. No edema, clubbing, or cyanosis in Ext x 4.     Vascular: DP + radial Pulse 2+ b/l.     Skin: No rash/ focal lesions.     Neuro: Speech clear. No facial droop. Becomes dizzy when sitting upright. CN II-XII intact b/l. Sensation intact to soft touch + Strength 5/5 in ext x 4.

## 2023-06-14 NOTE — H&P ADULT - ASSESSMENT
88 year old female with pmhx of htn, hld, cva on asa, cad stent on plavix, brought in by ems for dizziness + slurred speech this AM.   Slurred speech has resolved now.  Dizziness is improved but persistent at this time. VSS on room air.    R/o CVA + TIA   CT head w/ chronic infarct per Radiology report   Neuro Eval   TTE   Keep -180mmHg overnight   TSH/ A1c/ Lipid lvl   Cont ASA + Statin     DVT PPX   Dispo: Admit to SDU  Case discussed with Intensivist     Torsten SINGLETARY

## 2023-06-14 NOTE — H&P ADULT - NSHPLABSRESULTS_GEN_ALL_CORE
LABS:                          11.4   7.27  )-----------( 154      ( 2023 11:00 )             35.6     06-14    135  |  99  |  17  ----------------------------<  154<H>  4.4   |  26  |  0.9    Ca    9.3      2023 11:00    TPro  6.3  /  Alb  4.2  /  TBili  0.3  /  DBili  x   /  AST  18  /  ALT  13  /  AlkPhos  53  06-14    LIVER FUNCTIONS - ( 2023 11:00 )  Alb: 4.2 g/dL / Pro: 6.3 g/dL / ALK PHOS: 53 U/L / ALT: 13 U/L / AST: 18 U/L / GGT: x           PT/INR - ( 2023 11:00 )   PT: 12.00 sec;   INR: 1.05 ratio         PTT - ( 2023 11:00 )  PTT:29.1 sec  Urinalysis Basic - ( 2023 14:15 )    Color: Yellow / Appearance: Clear / S.010 / pH: x  Gluc: x / Ketone: Negative  / Bili: Negative / Urobili: 0.2 mg/dL   Blood: x / Protein: Trace mg/dL / Nitrite: Negative   Leuk Esterase: Negative / RBC: 1-2 /HPF / WBC 1-2 /HPF   Sq Epi: x / Non Sq Epi: x / Bacteria: Few /HPF      < from: CT Brain Perfusion Maps Stroke (23 @ 10:44) >    IMPRESSION:    1.  No evidence of acute large vessel occlusion. Number perfusion images.    2.  Stable CTA exam since 2018 with calcific plaque at the ICA   origins with mild (< 50%) stenosis.    --- End of Report ---    < end of copied text >

## 2023-06-14 NOTE — ED PROVIDER NOTE - CLINICAL SUMMARY MEDICAL DECISION MAKING FREE TEXT BOX
given this patient's symptoms we  activated stroke code upon arrival however given this patient's time course that she is passed window for TNK in addition nihss 0 full strength full sensation at this time we obtained EKG per my independent evaluation is not consistent with STEMI we obtained labs which is negative nevertheless we obtained a CT CT head and neck which is negative we discussed case with neurology advised admit to every 4 neurochecks discussed case with ICU who agrees with plan of care

## 2023-06-14 NOTE — ED ADULT NURSE NOTE - NSICDXPASTMEDICALHX_GEN_ALL_CORE_FT
PAST MEDICAL HISTORY:  Breast cancer     CVA (cerebral vascular accident)     DM (diabetes mellitus)     HTN (hypertension)     Vertigo

## 2023-06-14 NOTE — ED PROVIDER NOTE - CCCP TRG CHIEF CMPLNT

## 2023-06-14 NOTE — ED ADULT NURSE NOTE - NSFALLHARMRISKINTERV_ED_ALL_ED
Assistance OOB with selected safe patient handling equipment if applicable/Assistance with ambulation/Communicate risk of Fall with Harm to all staff, patient, and family/Encourage patient to sit up slowly, dangle for a short time, stand at bedside before walking/Monitor gait and stability/Orthostatic vital signs/Provide visual cue: red socks, yellow wristband, yellow gown, etc/Reinforce activity limits and safety measures with patient and family/Bed in lowest position, wheels locked, appropriate side rails in place/Call bell, personal items and telephone in reach/Instruct patient to call for assistance before getting out of bed/chair/stretcher/Non-slip footwear applied when patient is off stretcher/[place] to call system/Physically safe environment - no spills, clutter or unnecessary equipment/Purposeful Proactive Rounding/Room/bathroom lighting operational, light cord in reach

## 2023-06-14 NOTE — ED PROVIDER NOTE - OBJECTIVE STATEMENT
88 year old female with pmhx of htn, hld, cva on asa, cad stent on plavix, brought in by ems for dizziness. Pt admits to room spinning sensation upon awakening. Pt called her son and son noted she was slurring her words. Pt states having trouble getting her words out. no ha, visual changes, paresthesias or weakness. no chest pain or sob.

## 2023-06-14 NOTE — PATIENT PROFILE ADULT - FUNCTIONAL ASSESSMENT - BASIC MOBILITY 6.
4-calculated by average/Not able to assess (calculate score using [facility] averaging method)

## 2023-06-14 NOTE — PATIENT PROFILE ADULT - FALL HARM RISK - HARM RISK INTERVENTIONS
Assistance with ambulation/Assistance OOB with selected safe patient handling equipment/Communicate Risk of Fall with Harm to all staff/Monitor gait and stability/Reinforce activity limits and safety measures with patient and family/Sit up slowly, dangle for a short time, stand at bedside before walking/Tailored Fall Risk Interventions/Visual Cue: Yellow wristband and red socks/Bed in lowest position, wheels locked, appropriate side rails in place/Call bell, personal items and telephone in reach/Instruct patient to call for assistance before getting out of bed or chair/Non-slip footwear when patient is out of bed/[place] to call system/Physically safe environment - no spills, clutter or unnecessary equipment/Purposeful Proactive Rounding/Room/bathroom lighting operational, light cord in reach

## 2023-06-14 NOTE — ED PROVIDER NOTE - PHYSICAL EXAMINATION
CONST: Well appearing in NAD  EYES: PERRL, EOMI, Sclera and conjunctiva clear  ENT: No nasal discharge. ropharynx normal appearing, no erythema or exudates. No abscess or swelling. Uvula midline. No temporal artery or mastoid tenderness.  NECK: Non-tender, no meningeal signs. normal ROM. supple   CARD: Normal S1 S2; Normal rate and rhythm  RESP: Equal BS B/L, No wheezes, rhonchi or rales. No distress  GI: Soft, non-tender, non-distended. no cva tenderness. normal BS  MS: Normal ROM in all extremities. No midline spinal tenderness. pulses 2 +. no calf tenderness or swelling  SKIN: Warm, dry, no acute rashes. Good turgor  NEURO: A&Ox3, No focal deficits. Strength 5/5 with no sensory deficits.  Finger to nose intact. Negative pronator drift

## 2023-06-14 NOTE — ED ADULT TRIAGE NOTE - CHIEF COMPLAINT QUOTE
As per EMS, Patient's son stated that patient woke up at 0730 today with slurred speech, dizziness and weakness.

## 2023-06-14 NOTE — H&P ADULT - NS ATTEND AMEND GEN_ALL_CORE FT
88 year old female with pmhx of htn, hld, cva on asa, cad stent on plavix, brought in by ems for dizziness + slurred speech this AM.   Slurred speech has resolved now.  Dizziness is improved but persistent at this time. VSS on room air.    R/o CVA + TIA   CT head w/ chronic infarct per Radiology report   Neuro Eval   TTE   Keep -180mmHg overnight   TSH/ A1c/ Lipid lvl   Cont ASA + Statin     DVT PPX   Dispo: Admit to SDU

## 2023-06-15 LAB
A1C WITH ESTIMATED AVERAGE GLUCOSE RESULT: 8.3 % — HIGH (ref 4–5.6)
ALBUMIN SERPL ELPH-MCNC: 3.8 G/DL — SIGNIFICANT CHANGE UP (ref 3.5–5.2)
ALP SERPL-CCNC: 55 U/L — SIGNIFICANT CHANGE UP (ref 30–115)
ALT FLD-CCNC: 12 U/L — SIGNIFICANT CHANGE UP (ref 0–41)
ANION GAP SERPL CALC-SCNC: 10 MMOL/L — SIGNIFICANT CHANGE UP (ref 7–14)
AST SERPL-CCNC: 18 U/L — SIGNIFICANT CHANGE UP (ref 0–41)
BILIRUB SERPL-MCNC: 0.2 MG/DL — SIGNIFICANT CHANGE UP (ref 0.2–1.2)
BUN SERPL-MCNC: 22 MG/DL — HIGH (ref 10–20)
CALCIUM SERPL-MCNC: 9.7 MG/DL — SIGNIFICANT CHANGE UP (ref 8.4–10.5)
CHLORIDE SERPL-SCNC: 104 MMOL/L — SIGNIFICANT CHANGE UP (ref 98–110)
CHOLEST SERPL-MCNC: 303 MG/DL — HIGH
CO2 SERPL-SCNC: 26 MMOL/L — SIGNIFICANT CHANGE UP (ref 17–32)
CREAT SERPL-MCNC: 1 MG/DL — SIGNIFICANT CHANGE UP (ref 0.7–1.5)
EGFR: 54 ML/MIN/1.73M2 — LOW
ESTIMATED AVERAGE GLUCOSE: 192 MG/DL — HIGH (ref 68–114)
GLUCOSE BLDC GLUCOMTR-MCNC: 148 MG/DL — HIGH (ref 70–99)
GLUCOSE BLDC GLUCOMTR-MCNC: 152 MG/DL — HIGH (ref 70–99)
GLUCOSE BLDC GLUCOMTR-MCNC: 279 MG/DL — HIGH (ref 70–99)
GLUCOSE SERPL-MCNC: 107 MG/DL — HIGH (ref 70–99)
HCT VFR BLD CALC: 36.8 % — LOW (ref 37–47)
HDLC SERPL-MCNC: 62 MG/DL — SIGNIFICANT CHANGE UP
HGB BLD-MCNC: 11.9 G/DL — LOW (ref 12–16)
LIPID PNL WITH DIRECT LDL SERPL: 202 MG/DL — HIGH
MAGNESIUM SERPL-MCNC: 1.8 MG/DL — SIGNIFICANT CHANGE UP (ref 1.8–2.4)
MCHC RBC-ENTMCNC: 27.5 PG — SIGNIFICANT CHANGE UP (ref 27–31)
MCHC RBC-ENTMCNC: 32.3 G/DL — SIGNIFICANT CHANGE UP (ref 32–37)
MCV RBC AUTO: 85 FL — SIGNIFICANT CHANGE UP (ref 81–99)
NON HDL CHOLESTEROL: 241 MG/DL — HIGH
NRBC # BLD: 0 /100 WBCS — SIGNIFICANT CHANGE UP (ref 0–0)
PHOSPHATE SERPL-MCNC: 3.4 MG/DL — SIGNIFICANT CHANGE UP (ref 2.1–4.9)
PLATELET # BLD AUTO: 155 K/UL — SIGNIFICANT CHANGE UP (ref 130–400)
PMV BLD: 8.4 FL — SIGNIFICANT CHANGE UP (ref 7.4–10.4)
POTASSIUM SERPL-MCNC: 4.2 MMOL/L — SIGNIFICANT CHANGE UP (ref 3.5–5)
POTASSIUM SERPL-SCNC: 4.2 MMOL/L — SIGNIFICANT CHANGE UP (ref 3.5–5)
PROT SERPL-MCNC: 5.8 G/DL — LOW (ref 6–8)
RBC # BLD: 4.33 M/UL — SIGNIFICANT CHANGE UP (ref 4.2–5.4)
RBC # FLD: 13.3 % — SIGNIFICANT CHANGE UP (ref 11.5–14.5)
SODIUM SERPL-SCNC: 140 MMOL/L — SIGNIFICANT CHANGE UP (ref 135–146)
TRIGL SERPL-MCNC: 195 MG/DL — HIGH
TSH SERPL-MCNC: 3.11 UIU/ML — SIGNIFICANT CHANGE UP (ref 0.27–4.2)
WBC # BLD: 5.32 K/UL — SIGNIFICANT CHANGE UP (ref 4.8–10.8)
WBC # FLD AUTO: 5.32 K/UL — SIGNIFICANT CHANGE UP (ref 4.8–10.8)

## 2023-06-15 PROCEDURE — 99222 1ST HOSP IP/OBS MODERATE 55: CPT

## 2023-06-15 PROCEDURE — 93306 TTE W/DOPPLER COMPLETE: CPT | Mod: 26

## 2023-06-15 PROCEDURE — 99233 SBSQ HOSP IP/OBS HIGH 50: CPT

## 2023-06-15 PROCEDURE — 70551 MRI BRAIN STEM W/O DYE: CPT | Mod: 26

## 2023-06-15 RX ADMIN — Medication 1: at 21:42

## 2023-06-15 RX ADMIN — HEPARIN SODIUM 5000 UNIT(S): 5000 INJECTION INTRAVENOUS; SUBCUTANEOUS at 17:17

## 2023-06-15 RX ADMIN — HEPARIN SODIUM 5000 UNIT(S): 5000 INJECTION INTRAVENOUS; SUBCUTANEOUS at 06:15

## 2023-06-15 RX ADMIN — Medication 81 MILLIGRAM(S): at 11:11

## 2023-06-15 RX ADMIN — ATORVASTATIN CALCIUM 80 MILLIGRAM(S): 80 TABLET, FILM COATED ORAL at 21:41

## 2023-06-15 RX ADMIN — LISINOPRIL 5 MILLIGRAM(S): 2.5 TABLET ORAL at 06:13

## 2023-06-15 RX ADMIN — Medication 1: at 11:43

## 2023-06-15 RX ADMIN — CLOPIDOGREL BISULFATE 75 MILLIGRAM(S): 75 TABLET, FILM COATED ORAL at 11:11

## 2023-06-15 NOTE — CONSULT NOTE ADULT - SUBJECTIVE AND OBJECTIVE BOX
Neurology Consult    Patient is a 88y old  Female who presents with a chief complaint of CVA (2023 17:00)    HPI:  88 year old female with pmhx of htn, hld, cva on asa, cad stent on plavix, brought in by ems for dizziness + slurred speech this AM. Patient states that she first felt dizzy when she woke up this morning and got out of bed. Dizziness reported as room spinning and persistent but worse when standing. Called family at this time and was noted to have slurred speech/ difficulty finding words. Patient reports that speech is at baseline at this time.  No ha, visual changes, paresthesias or weakness. no chest pain or sob.    No intervention since symptoms rapidly improved back to baseline.      PAST MEDICAL & SURGICAL HISTORY:  DM (diabetes mellitus)  HTN (hypertension)  Vertigo  CVA (cerebral vascular accident)  Breast cancer  H/O mastectomy right    FAMILY HISTORY:  No pertinent family history in first degree relatives    Social History: (-) x 3    Allergies    codeine (Hives; Urticaria)    Intolerances    MEDICATIONS  (STANDING):  aspirin  chewable 81 milliGRAM(s) Oral daily  atorvastatin 80 milliGRAM(s) Oral at bedtime  clopidogrel Tablet 75 milliGRAM(s) Oral daily  dextrose 5%. 1000 milliLiter(s) (50 mL/Hr) IV Continuous <Continuous>  dextrose 5%. 1000 milliLiter(s) (100 mL/Hr) IV Continuous <Continuous>  dextrose 50% Injectable 12.5 Gram(s) IV Push once  dextrose 50% Injectable 25 Gram(s) IV Push once  dextrose 50% Injectable 25 Gram(s) IV Push once  glucagon  Injectable 1 milliGRAM(s) IntraMuscular once  heparin   Injectable 5000 Unit(s) SubCutaneous every 12 hours  insulin lispro (ADMELOG) corrective regimen sliding scale   SubCutaneous at bedtime  insulin lispro (ADMELOG) corrective regimen sliding scale   SubCutaneous three times a day before meals  lisinopril 5 milliGRAM(s) Oral daily    MEDICATIONS  (PRN):  dextrose Oral Gel 15 Gram(s) Oral once PRN Blood Glucose LESS THAN 70 milliGRAM(s)/deciliter  meclizine 25 milliGRAM(s) Oral every 8 hours PRN Dizziness    Review of systems:    Constitutional: as per HPI  Eyes: No eye pain or discharge  ENMT:  No difficulty hearing; No sinus or throat pain  Neck: No pain or stiffness  Respiratory: No cough, wheezing, chills or hemoptysis  Cardiovascular: No chest pain, palpitations, shortness of breath, dyspnea on exertion  Gastrointestinal: No abdominal pain, nausea, vomiting or hematemesis; No diarrhea or constipation.   Genitourinary: No dysuria, frequency, hematuria or incontinence  Neurological: As per HPI  Skin: No rashes or lesions   Endocrine: No heat or cold intolerance; No hair loss  Musculoskeletal: No joint pain or swelling  Psychiatric: No depression, anxiety, mood swings  Heme/Lymph: No easy bruising or bleeding gums    Vital Signs Last 24 Hrs  T(C): 36.2 (15 Fabrizio 2023 07:01), Max: 37.5 (2023 17:15)  T(F): 97.2 (15 Fabrizio 2023 07:01), Max: 99.5 (2023 17:15)  HR: 66 (15 Fabrizio 2023 07:04) (52 - 103)  BP: 124/58 (15 Fabrizio 2023 07:04) (114/56 - 186/84)  BP(mean): 84 (15 Fabrizio 2023 07:04) (80 - 96)  RR: 34 (15 Fabrizio 2023 07:04) (17 - 34)  SpO2: 98% (15 Fabrizio 2023 07:04) (95% - 100%)    Parameters below as of 15 Fabrizio 2023 07:04  Patient On (Oxygen Delivery Method): room air    Examination:  General:  Appearance is consistent with chronologic age.  No abnormal facies.  Gross skin survey within normal limits.    Cognitive/Language:  The patient is oriented to person, place, time and date.  Recent and remote memory intact.  Fund of knowledge is intact and normal.  Language with normal repetition, comprehension and naming.  Nondysarthric.    Eyes: intact VA, VFF.  EOMI w/o nystagmus, skew or reported double vision.  PERRL.  No ptosis/weakness of eyelid closure.    Face:  Facial sensation normal V1 - 3, no facial asymmetry.    Ears/Nose/Throat:  Hearing grossly intact b/l.  Palate elevates midline.  Tongue and uvula midline.   Motor examination:   Normal tone, bulk and range of motion.  No tenderness, twitching, tremors or involuntary movements.  Formal Muscle Strength Testing: (MRC grade R/L) 5/5 UE; 5/5 LE.  No observable drift.  Reflexes:   2+ b/l pectoralis, biceps, triceps, brachioradialis, patella and Achilles.  Plantar response downgoing b/l.  Jaw jerk, Vidhya, clonus absent.  Sensory examination:   Intact to light touch and pinprick, pain, temperature and proprioception and vibration in all extremities.  Cerebellum:   FTN/HKS intact with normal KATARZYNA in all limbs.  No dysmetria or dysdiadokinesia.  Gait narrow based and normal.    Labs:   CBC Full  -  ( 15 Fabrizio 2023 05:34 )  WBC Count : 5.32 K/uL  RBC Count : 4.33 M/uL  Hemoglobin : 11.9 g/dL  Hematocrit : 36.8 %  Platelet Count - Automated : 155 K/uL  Mean Cell Volume : 85.0 fL  Mean Cell Hemoglobin : 27.5 pg  Mean Cell Hemoglobin Concentration : 32.3 g/dL    06-15    140  |  104  |  22<H>  ----------------------------<  107<H>  4.2   |  26  |  1.0    Ca    9.7      15 Fabrizio 2023 05:34  Phos  3.4     -15  Mg     1.8     06-15    TPro  5.8<L>  /  Alb  3.8  /  TBili  0.2  /  DBili  x   /  AST  18  /  ALT  12  /  AlkPhos  55  -15    LIVER FUNCTIONS - ( 15 Fabrizio 2023 05:34 )  Alb: 3.8 g/dL / Pro: 5.8 g/dL / ALK PHOS: 55 U/L / ALT: 12 U/L / AST: 18 U/L / GGT: x           PT/INR - ( 2023 11:00 )   PT: 12.00 sec;   INR: 1.05 ratio         PTT - ( 2023 11:00 )  PTT:29.1 sec  Urinalysis Basic - ( 2023 14:15 )    Color: Yellow / Appearance: Clear / S.010 / pH: x  Gluc: x / Ketone: Negative  / Bili: Negative / Urobili: 0.2 mg/dL   Blood: x / Protein: Trace mg/dL / Nitrite: Negative   Leuk Esterase: Negative / RBC: 1-2 /HPF / WBC 1-2 /HPF   Sq Epi: x / Non Sq Epi: x / Bacteria: Few /HPF    < from: CT Angio Neck Stroke Protocol w/ IV Cont (23 @ 10:48) >  IMPRESSION:    1.  No evidence of acute large vessel occlusion. Number perfusion images.    2.  Stable CTA exam since 2018 with calcific plaque at the ICA   origins with mild (< 50%) stenosis.    --- End of Report ---    ERIN POLLARD MD; Attending Radiologist  This document has been electronically signed. 2023 11:18AM    < end of copied text >    < from: CT Brain Stroke Protocol (23 @ 10:24) >  IMPRESSION:    1.  No evidence of acute intracranial hemorrhage or large territory   infarct. Stable exam since 10/1/2022.    2.  Stable chronic microvascular changes and multiple chronic infarcts.    Spoke with TERESA YANEZ MD on 2023 10:27 AM with readback.    --- End of Report ---    ERIN POLLARD MD; Attending Radiologist  This document has been electronically signed. 2023 10:33AM    < end of copied text >       Neurology Consult    Patient is a 88y old  Female who presents with a chief complaint of CVA (2023 17:00)    HPI:  88 year old female with pmhx of htn, hld, cva on asa, cad stent on plavix, brought in by ems for dizziness + slurred speech this AM. Patient states that she first felt dizzy when she woke up this morning and got out of bed. Dizziness reported as room spinning and persistent but worse when standing. Called family at this time and was noted to have slurred speech/ difficulty finding words. Patient reports that speech is at baseline at this time.  No ha, visual changes, paresthesias or weakness. no chest pain or sob.    No intervention since symptoms rapidly improved back to baseline.  No residual deficits after prior strokes.  Compliant with medications.      PAST MEDICAL & SURGICAL HISTORY:  DM (diabetes mellitus)  HTN (hypertension)  Vertigo  CVA (cerebral vascular accident)  Breast cancer  H/O mastectomy right    FAMILY HISTORY:  No pertinent family history in first degree relatives    Social History: (-) x 3    Allergies    codeine (Hives; Urticaria)    Intolerances    MEDICATIONS  (STANDING):  aspirin  chewable 81 milliGRAM(s) Oral daily  atorvastatin 80 milliGRAM(s) Oral at bedtime  clopidogrel Tablet 75 milliGRAM(s) Oral daily  dextrose 5%. 1000 milliLiter(s) (50 mL/Hr) IV Continuous <Continuous>  dextrose 5%. 1000 milliLiter(s) (100 mL/Hr) IV Continuous <Continuous>  dextrose 50% Injectable 12.5 Gram(s) IV Push once  dextrose 50% Injectable 25 Gram(s) IV Push once  dextrose 50% Injectable 25 Gram(s) IV Push once  glucagon  Injectable 1 milliGRAM(s) IntraMuscular once  heparin   Injectable 5000 Unit(s) SubCutaneous every 12 hours  insulin lispro (ADMELOG) corrective regimen sliding scale   SubCutaneous at bedtime  insulin lispro (ADMELOG) corrective regimen sliding scale   SubCutaneous three times a day before meals  lisinopril 5 milliGRAM(s) Oral daily    MEDICATIONS  (PRN):  dextrose Oral Gel 15 Gram(s) Oral once PRN Blood Glucose LESS THAN 70 milliGRAM(s)/deciliter  meclizine 25 milliGRAM(s) Oral every 8 hours PRN Dizziness    Review of systems:    Constitutional: as per HPI  Eyes: No eye pain or discharge  ENMT:  No difficulty hearing; No sinus or throat pain  Neck: No pain or stiffness  Respiratory: No cough, wheezing, chills or hemoptysis  Cardiovascular: No chest pain, palpitations, shortness of breath, dyspnea on exertion  Gastrointestinal: No abdominal pain, nausea, vomiting or hematemesis; No diarrhea or constipation.   Genitourinary: No dysuria, frequency, hematuria or incontinence  Neurological: As per HPI  Skin: No rashes or lesions   Endocrine: No heat or cold intolerance; No hair loss  Musculoskeletal: No joint pain or swelling  Psychiatric: No depression, anxiety, mood swings  Heme/Lymph: No easy bruising or bleeding gums    Vital Signs Last 24 Hrs  T(C): 36.2 (15 Fabrizio 2023 07:01), Max: 37.5 (2023 17:15)  T(F): 97.2 (15 Fabrizio 2023 07:01), Max: 99.5 (2023 17:15)  HR: 66 (15 Fabrizio 2023 07:04) (52 - 103)  BP: 124/58 (15 Fabrizio 2023 07:04) (114/56 - 186/84)  BP(mean): 84 (15 Fabrizio 2023 07:04) (80 - 96)  RR: 34 (15 Fabrizio 2023 07:04) (17 - 34)  SpO2: 98% (15 Fabrizio 2023 07:04) (95% - 100%)    Parameters below as of 15 Fabrizio 2023 07:04  Patient On (Oxygen Delivery Method): room air    Examination:  General:  Appearance is consistent with chronologic age.  No abnormal facies.  Gross skin survey within normal limits.    Cognitive/Language:  The patient is oriented to person, place, time and date.  Recent and remote memory intact.  Fund of knowledge is intact and normal.  Language with normal repetition, comprehension and naming.  Nondysarthric.    Eyes: intact VA, VFF.  EOMI w/o nystagmus, skew or reported double vision.  PERRL.  No ptosis/weakness of eyelid closure.    Face:  Facial sensation normal V1 - 3, slight L NLF - chronic per pt   Ears/Nose/Throat:  Hearing grossly intact b/l.  Palate elevates midline.  Tongue and uvula midline.   Motor examination:   Normal tone, bulk and range of motion.  No tenderness, twitching, tremors or involuntary movements.  Formal Muscle Strength Testing: (MRC grade R/L) 5/5 UE; 5/5 LE.  No observable drift.  Reflexes:   2+ b/l pectoralis, biceps, triceps, brachioradialis, patella and Achilles.  Plantar response downgoing b/l.  Jaw jerk, Vidhya, clonus absent.  Sensory examination:   Intact to light touch and pinprick, pain, temperature and proprioception and vibration in all extremities.  Cerebellum:   FTN/HKS intact with normal KATARZYNA in all limbs.  No dysmetria or dysdiadokinesia.  Gait narrow based and normal.    NIHSS 1 (chronic)    Labs:   CBC Full  -  ( 15 Fabrizio 2023 05:34 )  WBC Count : 5.32 K/uL  RBC Count : 4.33 M/uL  Hemoglobin : 11.9 g/dL  Hematocrit : 36.8 %  Platelet Count - Automated : 155 K/uL  Mean Cell Volume : 85.0 fL  Mean Cell Hemoglobin : 27.5 pg  Mean Cell Hemoglobin Concentration : 32.3 g/dL    -15    140  |  104  |  22<H>  ----------------------------<  107<H>  4.2   |  26  |  1.0    Ca    9.7      15 Fabrizio 2023 05:34  Phos  3.4     06-15  Mg     1.8     06-15    TPro  5.8<L>  /  Alb  3.8  /  TBili  0.2  /  DBili  x   /  AST  18  /  ALT  12  /  AlkPhos  55  06-15    LIVER FUNCTIONS - ( 15 Fabrizio 2023 05:34 )  Alb: 3.8 g/dL / Pro: 5.8 g/dL / ALK PHOS: 55 U/L / ALT: 12 U/L / AST: 18 U/L / GGT: x           PT/INR - ( 2023 11:00 )   PT: 12.00 sec;   INR: 1.05 ratio         PTT - ( 2023 11:00 )  PTT:29.1 sec  Urinalysis Basic - ( 2023 14:15 )    Color: Yellow / Appearance: Clear / S.010 / pH: x  Gluc: x / Ketone: Negative  / Bili: Negative / Urobili: 0.2 mg/dL   Blood: x / Protein: Trace mg/dL / Nitrite: Negative   Leuk Esterase: Negative / RBC: 1-2 /HPF / WBC 1-2 /HPF   Sq Epi: x / Non Sq Epi: x / Bacteria: Few /HPF    < from: CT Angio Neck Stroke Protocol w/ IV Cont (23 @ 10:48) >  IMPRESSION:    1.  No evidence of acute large vessel occlusion. Number perfusion images.    2.  Stable CTA exam since 2018 with calcific plaque at the ICA   origins with mild (< 50%) stenosis.    --- End of Report ---    ERIN POLLARD MD; Attending Radiologist  This document has been electronically signed. 2023 11:18AM    < end of copied text >    < from: CT Brain Stroke Protocol (23 @ 10:24) >  IMPRESSION:    1.  No evidence of acute intracranial hemorrhage or large territory   infarct. Stable exam since 10/1/2022.    2.  Stable chronic microvascular changes and multiple chronic infarcts.    Spoke with TERESA YANEZ MD on 2023 10:27 AM with readback.    --- End of Report ---    ERIN POLLARD MD; Attending Radiologist  This document has been electronically signed. 2023 10:33AM    < end of copied text >

## 2023-06-15 NOTE — OCCUPATIONAL THERAPY INITIAL EVALUATION ADULT - GENERAL OBSERVATIONS, REHAB EVAL
10:52-11:25 Chart reviewed, ok to treat by Occupational Therapist as confirmed by RN Gela Pt received Semi-delaney's +tele with daughter-in-law present in NAD. Pt in agreement with OT IE.

## 2023-06-15 NOTE — SPEECH LANGUAGE PATHOLOGY EVALUATION - SLP DIAGNOSIS
Expressive/receptive language WFL, verbal speech out is clear. Mild word finding deficits reported by pt. Pt. reported "I have to think about what I want to say more"

## 2023-06-15 NOTE — SPEECH LANGUAGE PATHOLOGY EVALUATION - SLP PERTINENT HISTORY OF CURRENT PROBLEM
88 year old female with pmhx of htn, hld, cva on asa, cad stent on plavix, brought in by ems for dizziness + slurred speech this AM. Patient states that she first felt dizzy when she woke up this morning and got out of bed. Dizziness is persistent when laying flat but worse when standing. Called family at this time and was noted to have slurred speech/ difficulty finding words. Patient reports that speech is at baseline at this time.

## 2023-06-15 NOTE — OCCUPATIONAL THERAPY INITIAL EVALUATION ADULT - SITTING BALANCE: STATIC
Patient is scheduled 11/12 with the lung nodule clinic. Please call to schedule with PCP after that (there's a same day on 12/13 but PCP's note says: we should schedule follow up with me 3-4 weeks out just to touch base.   Lindsey Grigsby RN    good minus

## 2023-06-15 NOTE — PHYSICAL THERAPY INITIAL EVALUATION ADULT - PERTINENT HX OF CURRENT PROBLEM, REHAB EVAL
89 y/o female admitted with diagnosis of Dizziness and Giddiness, brought by EMS ro ED with c/o dizziness and slurred speech, no acute pathology on Head CT, awaiting Head MRI
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
## 2023-06-15 NOTE — PHYSICAL THERAPY INITIAL EVALUATION ADULT - PATIENT/FAMILY/SIGNIFICANT OTHER GOALS STATEMENT, PT EVAL
Patient would like to be able to get up and walk by herself so she can go home and take care of her dog

## 2023-06-15 NOTE — PHYSICAL THERAPY INITIAL EVALUATION ADULT - ADDITIONAL COMMENTS
Per patient, she lives alone with a dog in a condo where there is one step with door knob on (L) to hold on to, to enter home, then no further steps inside; uses rolling walker as needed when outside

## 2023-06-15 NOTE — SPEECH LANGUAGE PATHOLOGY EVALUATION - SLP GENERAL OBSERVATIONS
Pt. received OOB in chair. Pt. reported h/o CVA, dizziness, slurred speech, and difficulty "finding the words"

## 2023-06-15 NOTE — CONSULT NOTE ADULT - ASSESSMENT
87 yo RHF w/ h/o HTN, DLD, prior CVA, CAD on DAPT p/w vertigo and dysarthria/aphasia suspect CVA/TIA.    Recommendations:  - continue ASA 81/Plavix 75  - Lipitor 80  - MRI Brain NC  - Echo  - cardiac telemetry  - if MR (-), may d/c w/ outpt stroke clinic f/u in 2 wks 87 yo RHF w/ h/o HTN, DLD, prior CVA, CAD on DAPT p/w vertigo and dysarthria/aphasia suspect CVA/TIA.    Recommendations:  - continue ASA 81/Plavix 75  - Lipitor 80  - MRI Brain NC  - Echo  - cardiac telemetry  - consider MCOT/ILR  - if MR (-), may d/c w/ outpt stroke clinic f/u in 2 wks

## 2023-06-15 NOTE — PHYSICAL THERAPY INITIAL EVALUATION ADULT - GENERAL OBSERVATIONS, REHAB EVAL
11;35-12:00 Chart reviewed. Pt encountered sitting in chair,  may be seen by Physical Therapist as confirmed with Nurse. Patient denied pain at rest and ready to try to walk; +garett/vee CHARLES

## 2023-06-15 NOTE — PHYSICAL THERAPY INITIAL EVALUATION ADULT - MANUAL MUSCLE TESTING RESULTS, REHAB EVAL
Both upper extremites/Both lower extremities muscle strength grossly graded 3 to 3+/5 with (L) side slightly weaker than (R), Please refer to OT erendira for BUE

## 2023-06-15 NOTE — PROGRESS NOTE ADULT - SUBJECTIVE AND OBJECTIVE BOX
88 year old female with pmhx of htn, hld, cva on asa, cad stent on plavix, brought in by ems for dizziness + slurred speech this AM. Patient states that she first felt dizzy when she woke up this morning and got out of bed. Dizziness is persistent when laying flat but worse when standing. Called family at this time and was noted to have slurred speech/ difficulty finding words. Patient reports that speech is at baseline at this time. Denies changes in hearing/ vision, chest pain, SOB, fever, chills, cough, N/V/D, weakness, and any other acute complaints at this time.       PHYSICAL EXAM:  Constitutional: NAD, A + O x 3.   Eyes: PEERL, EOM intact b/l.   Neck: Supple, non-tender, trachea midline.   Respiratory: CTA b/l. No rhonchi/ rales/ wheeze.   Cardiovascular: S1/ S2 present and clear, no murmur, gallops, or rub.   Gastrointestinal: Abd soft + non-tender.   Vascular: DP + radial Pulse 2+ b/l.   Skin: No rash/ focal lesions.   Neuro: Speech clear. No facial droop. CN II-XII intact b/l. Sensation intact to soft touch + Strength 5/5 in ext x 4.

## 2023-06-15 NOTE — PHYSICAL THERAPY INITIAL EVALUATION ADULT - BED MOBILITY LIMITATIONS, REHAB EVAL
Patient encountered already out of bed in chair, no apparent distress. Per patient, required assistx1.

## 2023-06-15 NOTE — PROGRESS NOTE ADULT - SUBJECTIVE AND OBJECTIVE BOX
Patient seen and evaluated this am, reports slurred speech and word finding difficulty has resolved       T(F): 97.2 (06-15-23 @ 07:01), Max: 98.3 (06-14-23 @ 23:34)  HR: 55 (06-15-23 @ 17:02)  BP: 174/67 (06-15-23 @ 17:02)  RR: 18  SpO2: 98% (06-15-23 @ 07:04) (95% - 98%)    PHYSICAL EXAM:  GENERAL: NAD  HEAD:  Atraumatic, Normocephalic  EYES: EOMI, PERRLA, conjunctiva and sclera clear  NERVOUS SYSTEM:  Alert & Oriented X3, no focal deficits   CHEST/LUNG: Clear to percussion bilaterally; No rales, rhonchi, wheezing, or rubs  HEART: Regular rate and rhythm; No murmurs, rubs, or gallops  ABDOMEN: Soft, Nontender, Nondistended; Bowel sounds present  EXTREMITIES:  2+ Peripheral Pulses, No clubbing, cyanosis, or edema    LABS  06-15    140  |  104  |  22<H>  ----------------------------<  107<H>  4.2   |  26  |  1.0    Ca    9.7      15 Fabrizio 2023 05:34  Phos  3.4     06-15  Mg     1.8     06-15    TPro  5.8<L>  /  Alb  3.8  /  TBili  0.2  /  DBili  x   /  AST  18  /  ALT  12  /  AlkPhos  55  06-15                          11.9   5.32  )-----------( 155      ( 15 Fabrizio 2023 05:34 )             36.8     PT/INR - ( 14 Jun 2023 11:00 )   PT: 12.00 sec;   INR: 1.05 ratio         PTT - ( 14 Jun 2023 11:00 )  PTT:29.1 sec    CARDIAC ENZYMES      Troponin T, Serum: <0.01 ng/mL (06-14-23 @ 11:00)    < from: 12 Lead ECG (06.14.23 @ 11:13) >  Diagnosis Line Normal sinus rhythm    < end of copied text >  < from: TTE Echo Complete w/o Contrast w/ Doppler (06.15.23 @ 08:05) >  Summary:   1. LV Ejection Fraction by Potter's Method with a biplane EF of 72 %.   2. Normal left atrial size.   3. There is no evidence of pericardial effusion.   4. Mild mitral annular calcification.   5. Mild mitral valve regurgitation.   6. Mild tricuspid regurgitation.   7. Sclerotic aortic valve with normal opening.   8. There is mild aortic root calcification.    < end of copied text >      RADIOLOGY  < from: MR Head No Cont (06.15.23 @ 15:38) >  IMPRESSION:    No acute intracranial pathology.    Stable moderate chronic microvascular ischemic changes and scattered   chronic infarcts.      < end of copied text >    MEDICATIONS  (STANDING):  aspirin  chewable 81 milliGRAM(s) Oral daily  atorvastatin 80 milliGRAM(s) Oral at bedtime  clopidogrel Tablet 75 milliGRAM(s) Oral daily  heparin   Injectable 5000 Unit(s) SubCutaneous every 12 hours  insulin lispro (ADMELOG) corrective regimen sliding scale   SubCutaneous three times a day before meals  insulin lispro (ADMELOG) corrective regimen sliding scale   SubCutaneous at bedtime  lisinopril 5 milliGRAM(s) Oral daily    MEDICATIONS  (PRN):  dextrose Oral Gel 15 Gram(s) Oral once PRN Blood Glucose LESS THAN 70 milliGRAM(s)/deciliter  meclizine 25 milliGRAM(s) Oral every 8 hours PRN Dizziness

## 2023-06-15 NOTE — PHYSICAL THERAPY INITIAL EVALUATION ADULT - GAIT DEVIATIONS NOTED, PT EVAL
stooped posture, dec heel strike/pushoff/decreased butch/decreased step length/decreased weight-shifting ability

## 2023-06-15 NOTE — OCCUPATIONAL THERAPY INITIAL EVALUATION ADULT - ADDITIONAL COMMENTS
PLOF obtained from patient. (-) driving. Went on community van for food shopping. Pt states program will be discontinued at the end of June 2/2 loss of contract. (+) NADIA

## 2023-06-16 ENCOUNTER — TRANSCRIPTION ENCOUNTER (OUTPATIENT)
Age: 88
End: 2023-06-16

## 2023-06-16 VITALS — DIASTOLIC BLOOD PRESSURE: 66 MMHG | SYSTOLIC BLOOD PRESSURE: 165 MMHG | HEART RATE: 67 BPM

## 2023-06-16 LAB
GLUCOSE BLDC GLUCOMTR-MCNC: 174 MG/DL — HIGH (ref 70–99)
GLUCOSE BLDC GLUCOMTR-MCNC: 291 MG/DL — HIGH (ref 70–99)

## 2023-06-16 PROCEDURE — 99232 SBSQ HOSP IP/OBS MODERATE 35: CPT

## 2023-06-16 PROCEDURE — 99239 HOSP IP/OBS DSCHRG MGMT >30: CPT

## 2023-06-16 RX ORDER — ASPIRIN/CALCIUM CARB/MAGNESIUM 324 MG
1 TABLET ORAL
Qty: 30 | Refills: 0
Start: 2023-06-16 | End: 2023-07-15

## 2023-06-16 RX ORDER — CLOPIDOGREL BISULFATE 75 MG/1
1 TABLET, FILM COATED ORAL
Qty: 30 | Refills: 0
Start: 2023-06-16 | End: 2023-07-15

## 2023-06-16 RX ORDER — ACETAMINOPHEN 500 MG
650 TABLET ORAL EVERY 6 HOURS
Refills: 0 | Status: DISCONTINUED | OUTPATIENT
Start: 2023-06-16 | End: 2023-06-16

## 2023-06-16 RX ORDER — CLOPIDOGREL BISULFATE 75 MG/1
1 TABLET, FILM COATED ORAL
Qty: 0 | Refills: 0 | DISCHARGE

## 2023-06-16 RX ORDER — ATORVASTATIN CALCIUM 80 MG/1
0 TABLET, FILM COATED ORAL
Qty: 0 | Refills: 0 | DISCHARGE

## 2023-06-16 RX ORDER — ATORVASTATIN CALCIUM 80 MG/1
1 TABLET, FILM COATED ORAL
Qty: 30 | Refills: 0
Start: 2023-06-16 | End: 2023-07-15

## 2023-06-16 RX ORDER — ASPIRIN/CALCIUM CARB/MAGNESIUM 324 MG
1 TABLET ORAL
Qty: 0 | Refills: 0 | DISCHARGE

## 2023-06-16 RX ADMIN — HEPARIN SODIUM 5000 UNIT(S): 5000 INJECTION INTRAVENOUS; SUBCUTANEOUS at 05:28

## 2023-06-16 RX ADMIN — CLOPIDOGREL BISULFATE 75 MILLIGRAM(S): 75 TABLET, FILM COATED ORAL at 11:38

## 2023-06-16 RX ADMIN — LISINOPRIL 5 MILLIGRAM(S): 2.5 TABLET ORAL at 05:28

## 2023-06-16 RX ADMIN — Medication 650 MILLIGRAM(S): at 05:57

## 2023-06-16 RX ADMIN — Medication 3: at 11:37

## 2023-06-16 RX ADMIN — Medication 1: at 07:37

## 2023-06-16 RX ADMIN — Medication 650 MILLIGRAM(S): at 05:27

## 2023-06-16 RX ADMIN — Medication 81 MILLIGRAM(S): at 11:38

## 2023-06-16 NOTE — DISCHARGE NOTE NURSING/CASE MANAGEMENT/SOCIAL WORK - NSDCPEFALRISK_GEN_ALL_CORE
For information on Fall & Injury Prevention, visit: [email or web address] OR  [email or web address] OR  https://www.cdc.gov/steadi/patient.html

## 2023-06-16 NOTE — DISCHARGE NOTE NURSING/CASE MANAGEMENT/SOCIAL WORK - PATIENT PORTAL LINK FT
You can access the FollowMyHealth Patient Portal offered by [facility] by registering at the following website: [email or web address]. By joining [facility]’s FollowMyHealth portal, you will also be able to view your health information using other applications (apps) compatible with our system.

## 2023-06-16 NOTE — DISCHARGE NOTE PROVIDER - NSDCCPCAREPLAN_GEN_ALL_CORE_FT
PRINCIPAL DISCHARGE DIAGNOSIS  Diagnosis: Dizziness  Assessment and Plan of Treatment: You presented to the ED with dizziness and difficulty with speech. All of your smyptoms have resolved. CT head and MRI head did not show evidence of a new stroke, but there was evidence of chronic strokes from the past. It is possible that you may have had a "mini stroke" or TIA with symptoms now resolved. Please follow up Neurology within 1-2 weeks. In addition, please follow up with Cardiology within the next 7-10 days. Cardiology will arrange to place an Mobile Cardiac Outpatient Telemetry (MCOT) for monitoring your heart for any abnormal rhytms while you are home as an outpatient. Continue taking Aspirin, Plavix, and Lipitor at home.   Dizziness  Dizziness can manifest as a feeling of unsteadiness or light-headedness. You may feel like you are about to faint. This condition can be caused by a number of things, including medicines, dehydration, or illness. Drink enough fluid to keep your urine clear or pale yellow. Do not drink alcohol and limit your caffeine intake. Avoid quick or sudden movements.  Rise slowly from chairs and steady yourself until you feel okay. In the morning, first sit up on the side of the bed.  SEEK IMMEDIATE MEDICAL CARE IF YOU HAVE ANY OF THE FOLLOWING SYMPTOMS: vomiting, changes in your vision or speech, weakness in your arms or legs, trouble speaking or swallowing, chest pain, abdominal pain, shortness of breath, sweating, bleeding, headache, neck pain, or fever.

## 2023-06-16 NOTE — DISCHARGE NOTE PROVIDER - CARE PROVIDER_API CALL
Leonel Henry  Neurology  [address] [place] [address], [address]  [place], [place] [id number]  Phone: [phone]  Fax: [phone]  Follow Up Time: 1 week    Shorty Carter  Cardiovascular Disease  [address] [place] [id number]  Phone: [phone]  Fax: [phone]  Follow Up Time: 1 week

## 2023-06-16 NOTE — PROGRESS NOTE ADULT - SUBJECTIVE AND OBJECTIVE BOX
Neurology Follow up note    Name  VAN HARMON    HPI:   88 year old female with pmhx of htn, hld, cva on asa, cad stent on plavix, brought in by ems for dizziness + slurred speech this AM. Patient states that she first felt dizzy when she woke up this morning and got out of bed. Dizziness is persistent when laying flat but worse when standing. Called family at this time and was noted to have slurred speech/ difficulty finding words. Patient reports that speech is at baseline at this time.   Denies changes in hearing/ vision, chest pain, SOB, fever, chills, cough, N/V/D, weakness, and any other acute complaints at this time.    (14 Jun 2023 17:00)      Interval History - no events overnight          Vital Signs Last 24 Hrs  T(C): 36.5 (16 Jun 2023 07:05), Max: 36.5 (16 Jun 2023 07:05)  T(F): 97.7 (16 Jun 2023 07:05), Max: 97.7 (16 Jun 2023 07:05)  HR: 67 (16 Jun 2023 07:13) (52 - 68)  BP: 165/66 (16 Jun 2023 07:13) (112/54 - 174/67)  BP(mean): 95 (16 Jun 2023 07:13) (76 - 98)  RR: 18 (16 Jun 2023 07:05) (18 - 36)  SpO2: 95% (16 Jun 2023 00:00) (95% - 95%)    Parameters below as of 16 Jun 2023 07:13  Patient On (Oxygen Delivery Method): room air      Examination:  General:  Appearance is consistent with chronologic age.  No abnormal facies.  Gross skin survey within normal limits.    Cognitive/Language:  The patient is oriented to person, place, time and date.  Recent and remote memory intact.  Fund of knowledge is intact and normal.  Language with normal repetition, comprehension and naming.  Nondysarthric.    Eyes: intact VA, VFF.  EOMI w/o nystagmus, skew or reported double vision.  PERRL.  No ptosis/weakness of eyelid closure.    Face:  Facial sensation normal V1 - 3, slight L NLF - chronic per pt   Ears/Nose/Throat:  Hearing grossly intact b/l.  Palate elevates midline.  Tongue and uvula midline.   Motor examination:   Normal tone, bulk and range of motion.  No tenderness, twitching, tremors or involuntary movements.  Formal Muscle Strength Testing: (MRC grade R/L) 5/5 UE; 5/5 LE.  No observable drift.  Reflexes:   2+ b/l pectoralis, biceps, triceps, brachioradialis, patella and Achilles.  Plantar response downgoing b/l.  Jaw jerk, Vidhya, clonus absent.  Sensory examination:   Intact to light touch and pinprick, pain, temperature and proprioception and vibration in all extremities.  Cerebellum:   FTN/HKS intact with normal KATARZYNA in all limbs.  No dysmetria or dysdiadokinesia.  Gait narrow based and normal.    NIHSS 1 (chronic)    Medications  acetaminophen     Tablet .. 650 milliGRAM(s) Oral every 6 hours PRN  aspirin  chewable 81 milliGRAM(s) Oral daily  atorvastatin 80 milliGRAM(s) Oral at bedtime  clopidogrel Tablet 75 milliGRAM(s) Oral daily  dextrose 5%. 1000 milliLiter(s) IV Continuous <Continuous>  dextrose 5%. 1000 milliLiter(s) IV Continuous <Continuous>  dextrose 50% Injectable 25 Gram(s) IV Push once  dextrose 50% Injectable 12.5 Gram(s) IV Push once  dextrose 50% Injectable 25 Gram(s) IV Push once  dextrose Oral Gel 15 Gram(s) Oral once PRN  glucagon  Injectable 1 milliGRAM(s) IntraMuscular once  heparin   Injectable 5000 Unit(s) SubCutaneous every 12 hours  insulin lispro (ADMELOG) corrective regimen sliding scale   SubCutaneous three times a day before meals  insulin lispro (ADMELOG) corrective regimen sliding scale   SubCutaneous at bedtime  lisinopril 5 milliGRAM(s) Oral daily  meclizine 25 milliGRAM(s) Oral every 8 hours PRN      Lab                        11.9   5.32  )-----------( 155      ( 15 Fabrizio 2023 05:34 )             36.8     06-15    140  |  104  |  22<H>  ----------------------------<  107<H>  4.2   |  26  |  1.0    Ca    9.7      15 Fabrizio 2023 05:34  Phos  3.4     06-15  Mg     1.8     06-15    TPro  5.8<L>  /  Alb  3.8  /  TBili  0.2  /  DBili  x   /  AST  18  /  ALT  12  /  AlkPhos  55  06-15    CAPILLARY BLOOD GLUCOSE      POCT Blood Glucose.: 174 mg/dL (16 Jun 2023 07:30)  POCT Blood Glucose.: 279 mg/dL (15 Fabrizio 2023 21:36)  POCT Blood Glucose.: 148 mg/dL (15 Fabrizio 2023 17:09)  POCT Blood Glucose.: 152 mg/dL (15 Fabrizio 2023 11:37)    LIVER FUNCTIONS - ( 15 Fabrizio 2023 05:34 )  Alb: 3.8 g/dL / Pro: 5.8 g/dL / ALK PHOS: 55 U/L / ALT: 12 U/L / AST: 18 U/L / GGT: x                   MR Head No Cont (06.15.23 @ 15:38)   IMPRESSION:    No acute intracranial pathology.    Stable moderate chronic microvascular ischemic changes and scattered   chronic infarcts.       Neurology Follow up note    Name  VAN HARMON    HPI:   88 year old female with pmhx of htn, hld, cva on asa, cad stent on plavix, brought in by ems for dizziness + slurred speech this AM. Patient states that she first felt dizzy when she woke up this morning and got out of bed. Dizziness is persistent when laying flat but worse when standing. Called family at this time and was noted to have slurred speech/ difficulty finding words. Patient reports that speech is at baseline at this time.   Denies changes in hearing/ vision, chest pain, SOB, fever, chills, cough, N/V/D, weakness, and any other acute complaints at this time.    (14 Jun 2023 17:00)      Interval History - no events overnight.  Pt currently back to baseline.        Vital Signs Last 24 Hrs  T(C): 36.5 (16 Jun 2023 07:05), Max: 36.5 (16 Jun 2023 07:05)  T(F): 97.7 (16 Jun 2023 07:05), Max: 97.7 (16 Jun 2023 07:05)  HR: 67 (16 Jun 2023 07:13) (52 - 68)  BP: 165/66 (16 Jun 2023 07:13) (112/54 - 174/67)  BP(mean): 95 (16 Jun 2023 07:13) (76 - 98)  RR: 18 (16 Jun 2023 07:05) (18 - 36)  SpO2: 95% (16 Jun 2023 00:00) (95% - 95%)    Parameters below as of 16 Jun 2023 07:13  Patient On (Oxygen Delivery Method): room air      Examination:  General:  Appearance is consistent with chronologic age.  No abnormal facies.  Gross skin survey within normal limits.    Cognitive/Language:  The patient is oriented to person, place, time and date.  Recent and remote memory intact.  Fund of knowledge is intact and normal.  Language with normal repetition, comprehension and naming.  Nondysarthric.    Eyes: intact VA, VFF.  EOMI w/o nystagmus, skew or reported double vision.  PERRL.  No ptosis/weakness of eyelid closure.    Face:  Facial sensation normal V1 - 3, slight L NLF - chronic per pt   Ears/Nose/Throat:  Hearing grossly intact b/l.  Palate elevates midline.  Tongue and uvula midline.   Motor examination:   Normal tone, bulk and range of motion.  No tenderness, twitching, tremors or involuntary movements.  Formal Muscle Strength Testing: (MRC grade R/L) 5/5 UE; 5/5 LE.  No observable drift.  Reflexes:   2+ b/l pectoralis, biceps, triceps, brachioradialis, patella and Achilles.  Plantar response downgoing b/l.  Jaw jerk, Vidhya, clonus absent.  Sensory examination:   Intact to light touch and pinprick, pain, temperature and proprioception and vibration in all extremities.  Cerebellum:   FTN/HKS intact with normal KATARZYNA in all limbs.  No dysmetria or dysdiadokinesia.  Gait narrow based and normal.    NIHSS 1 (chronic)    Medications  acetaminophen     Tablet .. 650 milliGRAM(s) Oral every 6 hours PRN  aspirin  chewable 81 milliGRAM(s) Oral daily  atorvastatin 80 milliGRAM(s) Oral at bedtime  clopidogrel Tablet 75 milliGRAM(s) Oral daily  dextrose 5%. 1000 milliLiter(s) IV Continuous <Continuous>  dextrose 5%. 1000 milliLiter(s) IV Continuous <Continuous>  dextrose 50% Injectable 25 Gram(s) IV Push once  dextrose 50% Injectable 12.5 Gram(s) IV Push once  dextrose 50% Injectable 25 Gram(s) IV Push once  dextrose Oral Gel 15 Gram(s) Oral once PRN  glucagon  Injectable 1 milliGRAM(s) IntraMuscular once  heparin   Injectable 5000 Unit(s) SubCutaneous every 12 hours  insulin lispro (ADMELOG) corrective regimen sliding scale   SubCutaneous three times a day before meals  insulin lispro (ADMELOG) corrective regimen sliding scale   SubCutaneous at bedtime  lisinopril 5 milliGRAM(s) Oral daily  meclizine 25 milliGRAM(s) Oral every 8 hours PRN      Lab                        11.9   5.32  )-----------( 155      ( 15 Fabrizio 2023 05:34 )             36.8     06-15    140  |  104  |  22<H>  ----------------------------<  107<H>  4.2   |  26  |  1.0    Ca    9.7      15 Fabrizio 2023 05:34  Phos  3.4     06-15  Mg     1.8     06-15    TPro  5.8<L>  /  Alb  3.8  /  TBili  0.2  /  DBili  x   /  AST  18  /  ALT  12  /  AlkPhos  55  06-15    CAPILLARY BLOOD GLUCOSE      POCT Blood Glucose.: 174 mg/dL (16 Jun 2023 07:30)  POCT Blood Glucose.: 279 mg/dL (15 Fabrizio 2023 21:36)  POCT Blood Glucose.: 148 mg/dL (15 Fabrizio 2023 17:09)  POCT Blood Glucose.: 152 mg/dL (15 Fabrizio 2023 11:37)    LIVER FUNCTIONS - ( 15 Fabrizio 2023 05:34 )  Alb: 3.8 g/dL / Pro: 5.8 g/dL / ALK PHOS: 55 U/L / ALT: 12 U/L / AST: 18 U/L / GGT: x                   MR Head No Cont (06.15.23 @ 15:38)   IMPRESSION:    No acute intracranial pathology.    Stable moderate chronic microvascular ischemic changes and scattered   chronic infarcts.

## 2023-06-16 NOTE — DISCHARGE NOTE PROVIDER - PROVIDER TOKENS
PROVIDER:[TOKEN:[[id number]],FOLLOWUP:[1 week]],PROVIDER:[TOKEN:[[id number]],FOLLOWUP:[1 week]]

## 2023-06-16 NOTE — DISCHARGE NOTE PROVIDER - HOSPITAL COURSE
88 year old female with pmhx of htn, hld, cva on asa, cad stent on plavix, brought in by ems for dizziness + slurred speech this AM. Patient states that she first felt dizzy when she woke up this morning and got out of bed. Dizziness is persistent when laying flat but worse when standing. Called family at this time and was noted to have slurred speech/ difficulty finding words. Patient reports that speech is at baseline at this time. Denies changes in hearing/ vision, chest pain, SOB, fever, chills, cough, N/V/D, weakness, and any other acute complaints at this time. No intervention since symptoms rapidly improved back to baseline.  No residual deficits after prior strokes.  Compliant with medications.     In ICU, pt was monitored with neuro checks regularly. Neurology evaluated. CT head and MRI with chronic infarcts, no evidence of acute infarcts. TTE unremarkable. Cardiology will arrange for outpt [facility] and will f/u in office. Continue w Aspirin, Plavix and Lipitor. No interventions. Will need outpt stroke clinic f/u in 2 wks.

## 2023-06-16 NOTE — PROGRESS NOTE ADULT - SUBJECTIVE AND OBJECTIVE BOX
Patient is a 88y old  Female who presents with a chief complaint of slurred speech (15 Fabrizio 2023 18:50)      T(F): 97.7 (06-16-23 @ 07:05), Max: 97.7 (06-16-23 @ 07:05)  HR: 67 (06-16-23 @ 07:13)  BP: 165/66 (06-16-23 @ 07:13)  RR: 18 (06-16-23 @ 07:05)  SpO2: 95% (06-16-23 @ 00:00) (95% - 95%)    PHYSICAL EXAM:  GENERAL: NAD  HEAD:  Atraumatic, Normocephalic  EYES: EOMI, PERRLA, conjunctiva and sclera clear  NERVOUS SYSTEM:  Alert & Oriented X3, no focal deficits   CHEST/LUNG: Clear to percussion bilaterally; No rales, rhonchi, wheezing, or rubs  HEART: Regular rate and rhythm; No murmurs, rubs, or gallops  ABDOMEN: Soft, Nontender, Nondistended; Bowel sounds present  EXTREMITIES:  2+ Peripheral Pulses, No clubbing, cyanosis, or edema    LABS  06-15    140  |  104  |  22<H>  ----------------------------<  107<H>  4.2   |  26  |  1.0    Ca    9.7      15 Fabrizio 2023 05:34  Phos  3.4     06-15  Mg     1.8     06-15    TPro  5.8<L>  /  Alb  3.8  /  TBili  0.2  /  DBili  x   /  AST  18  /  ALT  12  /  AlkPhos  55  06-15                          11.9   5.32  )-----------( 155      ( 15 Fabrizio 2023 05:34 )             36.8     PT/INR - ( 14 Jun 2023 11:00 )   PT: 12.00 sec;   INR: 1.05 ratio         PTT - ( 14 Jun 2023 11:00 )  PTT:29.1 sec    CARDIAC ENZYMES    Troponin T, Serum: <0.01 ng/mL (06-14-23 @ 11:00)    < from: 12 Lead ECG (06.14.23 @ 11:13) >  Diagnosis Line Normal sinus rhythm    < end of copied text >  < from: TTE Echo Complete w/o Contrast w/ Doppler (06.15.23 @ 08:05) >  Summary:   1. LV Ejection Fraction by Potter's Method with a biplane EF of 72 %.   2. Normal left atrial size.   3. There is no evidence of pericardial effusion.   4. Mild mitral annular calcification.   5. Mild mitral valve regurgitation.   6. Mild tricuspid regurgitation.   7. Sclerotic aortic valve with normal opening.   8. There is mild aortic root calcification.    < end of copied text >      RADIOLOGY  < from: MR Head No Cont (06.15.23 @ 15:38) >    IMPRESSION:    No acute intracranial pathology.    Stable moderate chronic microvascular ischemic changes and scattered   chronic infarcts.      < end of copied text >    MEDICATIONS  (STANDING):  aspirin  chewable 81 milliGRAM(s) Oral daily  atorvastatin 80 milliGRAM(s) Oral at bedtime  clopidogrel Tablet 75 milliGRAM(s) Oral daily  heparin   Injectable 5000 Unit(s) SubCutaneous every 12 hours  insulin lispro (ADMELOG) corrective regimen sliding scale   SubCutaneous three times a day before meals  insulin lispro (ADMELOG) corrective regimen sliding scale   SubCutaneous at bedtime  lisinopril 5 milliGRAM(s) Oral daily    MEDICATIONS  (PRN):  acetaminophen     Tablet .. 650 milliGRAM(s) Oral every 6 hours PRN Mild Pain (1 - 3)  dextrose Oral Gel 15 Gram(s) Oral once PRN Blood Glucose LESS THAN 70 milliGRAM(s)/deciliter  meclizine 25 milliGRAM(s) Oral every 8 hours PRN Dizziness

## 2023-06-16 NOTE — PROGRESS NOTE ADULT - ASSESSMENT
88 year old female with pmhx of htn, hld, cva on asa, cad stent on plavix, brought in by ems for dizziness + slurred speech this AM. Slurred speech has resolved now.  Dizziness is improved but persistent at this time. VSS on room air.    #r/o CVA  symptoms have now resolved, pt is back to baseline  -CT head and MRI with chronic infarct   -TTE unremarkable   -ASA, plavix, statin   -neuro following  -neuro q4 checks     DVT PPX   Dispo: SDU
 88 year old female with pmhx of htn, hld, cva, cad, admitted to SDU for slurred speech/ difficulty finding words. Patient reports that symptoms have resolved.    TIA     - mri negative   - TTE normal   - May DC home    - arrange for [facility]OT   - continue aspirin, Plavix and Lipitor   - neuro f/u as outpt   - 34min spent on DC    
 88 year old female with pmhx of htn, hld, cva, cad, admitted to SDU for slurred speech/ difficulty finding words. Patient reports that symptoms have resolved.    TIA     - mri negative   - TTE normal   - May DC home in am   - arrange for [facility]OT   - continue aspirin, Plavix and Lipitor   - neuro f/u as outpt  
87 yo RHF w/ h/o HTN, DLD, prior CVA, CAD on DAPT p/w vertigo and dysarthria/aphasia suspect CVA/TIA.  MRI brain revealed no acute infarct. NIHSS 1 today, unchanged.    Recommendations:  - continue ASA 81/Plavix 75  - Lipitor 80 mg daily  - follow up outpatient cardiology for MCOT  - no further inpatient neuro intervention, will need outpt stroke clinic f/u in 2 wks, recall if new concerns arise

## 2023-06-16 NOTE — DISCHARGE NOTE PROVIDER - NSDCMRMEDTOKEN_GEN_ALL_CORE_FT
aspirin 81 mg oral tablet: 1 tab(s) orally once a day  atorvastatin 80 mg oral tablet: 1 tab(s) orally once a day  glimepiride 4 mg oral tablet: 1 tab(s) orally once a day  meclizine 50 mg oral tablet: 1 tab(s) orally 3 times a day   Plavix 75 mg oral tablet: 1 tab(s) orally once a day  quinapril 5 mg oral tablet: 1 tab(s) orally once a day

## 2023-06-16 NOTE — SWALLOW BEDSIDE ASSESSMENT ADULT - COMMENTS
MRI: Stable chronic infarcts in the right cerebellum, right occipital lobe, and thalami. There are scattered patchy T2/FLAIR hyperintensities in the bilateral periventricular cerebral white matter, consistent with moderate chronic microvascular ischemic changes. No acute infarction, intracranial hemorrhage or mass.

## 2023-06-21 DIAGNOSIS — Z88.6 ALLERGY STATUS TO ANALGESIC AGENT: ICD-10-CM

## 2023-06-21 DIAGNOSIS — R47.1 DYSARTHRIA AND ANARTHRIA: ICD-10-CM

## 2023-06-21 DIAGNOSIS — I10 ESSENTIAL (PRIMARY) HYPERTENSION: ICD-10-CM

## 2023-06-21 DIAGNOSIS — G45.9 TRANSIENT CEREBRAL ISCHEMIC ATTACK, UNSPECIFIED: ICD-10-CM

## 2023-06-21 DIAGNOSIS — Z79.02 LONG TERM (CURRENT) USE OF ANTITHROMBOTICS/ANTIPLATELETS: ICD-10-CM

## 2023-06-21 DIAGNOSIS — I25.10 ATHEROSCLEROTIC HEART DISEASE OF NATIVE CORONARY ARTERY WITHOUT ANGINA PECTORIS: ICD-10-CM

## 2023-06-21 DIAGNOSIS — R42 DIZZINESS AND GIDDINESS: ICD-10-CM

## 2023-06-21 DIAGNOSIS — Z79.84 LONG TERM (CURRENT) USE OF ORAL HYPOGLYCEMIC DRUGS: ICD-10-CM

## 2023-06-21 DIAGNOSIS — R47.81 SLURRED SPEECH: ICD-10-CM

## 2023-06-21 DIAGNOSIS — Z79.82 LONG TERM (CURRENT) USE OF ASPIRIN: ICD-10-CM

## 2023-06-21 DIAGNOSIS — R47.01 APHASIA: ICD-10-CM

## 2023-06-21 DIAGNOSIS — Z95.5 PRESENCE OF CORONARY ANGIOPLASTY IMPLANT AND GRAFT: ICD-10-CM

## 2023-07-28 ENCOUNTER — INPATIENT (INPATIENT)
Facility: HOSPITAL | Age: 88
LOS: 3 days | Discharge: HOME | DRG: 149 | End: 2023-08-01
Attending: HOSPITALIST | Admitting: INTERNAL MEDICINE
Payer: MEDICARE

## 2023-07-28 VITALS — HEIGHT: 62 IN | WEIGHT: 136.47 LBS

## 2023-07-28 DIAGNOSIS — Z90.10 ACQUIRED ABSENCE OF UNSPECIFIED BREAST AND NIPPLE: Chronic | ICD-10-CM

## 2023-07-28 DIAGNOSIS — I63.9 CEREBRAL INFARCTION, UNSPECIFIED: ICD-10-CM

## 2023-07-28 LAB
ALBUMIN SERPL ELPH-MCNC: 4.6 G/DL — SIGNIFICANT CHANGE UP (ref 3.5–5.2)
ALP SERPL-CCNC: 70 U/L — SIGNIFICANT CHANGE UP (ref 30–115)
ALT FLD-CCNC: 17 U/L — SIGNIFICANT CHANGE UP (ref 0–41)
ANION GAP SERPL CALC-SCNC: 11 MMOL/L — SIGNIFICANT CHANGE UP (ref 7–14)
APTT BLD: 30.7 SEC — SIGNIFICANT CHANGE UP (ref 27–39.2)
AST SERPL-CCNC: 23 U/L — SIGNIFICANT CHANGE UP (ref 0–41)
BASOPHILS # BLD AUTO: 0.02 K/UL — SIGNIFICANT CHANGE UP (ref 0–0.2)
BASOPHILS NFR BLD AUTO: 0.3 % — SIGNIFICANT CHANGE UP (ref 0–1)
BILIRUB SERPL-MCNC: 0.3 MG/DL — SIGNIFICANT CHANGE UP (ref 0.2–1.2)
BUN SERPL-MCNC: 14 MG/DL — SIGNIFICANT CHANGE UP (ref 10–20)
CALCIUM SERPL-MCNC: 9.6 MG/DL — SIGNIFICANT CHANGE UP (ref 8.4–10.5)
CHLORIDE SERPL-SCNC: 102 MMOL/L — SIGNIFICANT CHANGE UP (ref 98–110)
CO2 SERPL-SCNC: 26 MMOL/L — SIGNIFICANT CHANGE UP (ref 17–32)
CREAT SERPL-MCNC: 0.9 MG/DL — SIGNIFICANT CHANGE UP (ref 0.7–1.5)
EGFR: 61 ML/MIN/1.73M2 — SIGNIFICANT CHANGE UP
EOSINOPHIL # BLD AUTO: 0.03 K/UL — SIGNIFICANT CHANGE UP (ref 0–0.7)
EOSINOPHIL NFR BLD AUTO: 0.5 % — SIGNIFICANT CHANGE UP (ref 0–8)
GLUCOSE BLDC GLUCOMTR-MCNC: 203 MG/DL — HIGH (ref 70–99)
GLUCOSE BLDC GLUCOMTR-MCNC: 78 MG/DL — SIGNIFICANT CHANGE UP (ref 70–99)
GLUCOSE SERPL-MCNC: 143 MG/DL — HIGH (ref 70–99)
HCT VFR BLD CALC: 36.9 % — LOW (ref 37–47)
HGB BLD-MCNC: 12 G/DL — SIGNIFICANT CHANGE UP (ref 12–16)
IMM GRANULOCYTES NFR BLD AUTO: 0.3 % — SIGNIFICANT CHANGE UP (ref 0.1–0.3)
INR BLD: 1 RATIO — SIGNIFICANT CHANGE UP (ref 0.65–1.3)
LYMPHOCYTES # BLD AUTO: 1.02 K/UL — LOW (ref 1.2–3.4)
LYMPHOCYTES # BLD AUTO: 17.5 % — LOW (ref 20.5–51.1)
MCHC RBC-ENTMCNC: 27.7 PG — SIGNIFICANT CHANGE UP (ref 27–31)
MCHC RBC-ENTMCNC: 32.5 G/DL — SIGNIFICANT CHANGE UP (ref 32–37)
MCV RBC AUTO: 85.2 FL — SIGNIFICANT CHANGE UP (ref 81–99)
MONOCYTES # BLD AUTO: 0.32 K/UL — SIGNIFICANT CHANGE UP (ref 0.1–0.6)
MONOCYTES NFR BLD AUTO: 5.5 % — SIGNIFICANT CHANGE UP (ref 1.7–9.3)
NEUTROPHILS # BLD AUTO: 4.41 K/UL — SIGNIFICANT CHANGE UP (ref 1.4–6.5)
NEUTROPHILS NFR BLD AUTO: 75.9 % — HIGH (ref 42.2–75.2)
NRBC # BLD: 0 /100 WBCS — SIGNIFICANT CHANGE UP (ref 0–0)
PLATELET # BLD AUTO: 171 K/UL — SIGNIFICANT CHANGE UP (ref 130–400)
PMV BLD: 8.4 FL — SIGNIFICANT CHANGE UP (ref 7.4–10.4)
POTASSIUM SERPL-MCNC: 4.2 MMOL/L — SIGNIFICANT CHANGE UP (ref 3.5–5)
POTASSIUM SERPL-SCNC: 4.2 MMOL/L — SIGNIFICANT CHANGE UP (ref 3.5–5)
PROT SERPL-MCNC: 6.9 G/DL — SIGNIFICANT CHANGE UP (ref 6–8)
PROTHROM AB SERPL-ACNC: 11.4 SEC — SIGNIFICANT CHANGE UP (ref 9.95–12.87)
RBC # BLD: 4.33 M/UL — SIGNIFICANT CHANGE UP (ref 4.2–5.4)
RBC # FLD: 13.4 % — SIGNIFICANT CHANGE UP (ref 11.5–14.5)
SODIUM SERPL-SCNC: 139 MMOL/L — SIGNIFICANT CHANGE UP (ref 135–146)
TROPONIN T SERPL-MCNC: <0.01 NG/ML — SIGNIFICANT CHANGE UP
WBC # BLD: 5.82 K/UL — SIGNIFICANT CHANGE UP (ref 4.8–10.8)
WBC # FLD AUTO: 5.82 K/UL — SIGNIFICANT CHANGE UP (ref 4.8–10.8)

## 2023-07-28 PROCEDURE — 82962 GLUCOSE BLOOD TEST: CPT

## 2023-07-28 PROCEDURE — 93005 ELECTROCARDIOGRAM TRACING: CPT

## 2023-07-28 PROCEDURE — 80053 COMPREHEN METABOLIC PANEL: CPT

## 2023-07-28 PROCEDURE — 93010 ELECTROCARDIOGRAM REPORT: CPT

## 2023-07-28 PROCEDURE — 97165 OT EVAL LOW COMPLEX 30 MIN: CPT | Mod: GO

## 2023-07-28 PROCEDURE — 97116 GAIT TRAINING THERAPY: CPT | Mod: GP

## 2023-07-28 PROCEDURE — 36415 COLL VENOUS BLD VENIPUNCTURE: CPT

## 2023-07-28 PROCEDURE — 92610 EVALUATE SWALLOWING FUNCTION: CPT | Mod: GN

## 2023-07-28 PROCEDURE — 0042T: CPT | Mod: MA

## 2023-07-28 PROCEDURE — 99223 1ST HOSP IP/OBS HIGH 75: CPT

## 2023-07-28 PROCEDURE — 97110 THERAPEUTIC EXERCISES: CPT | Mod: GP

## 2023-07-28 PROCEDURE — 83735 ASSAY OF MAGNESIUM: CPT

## 2023-07-28 PROCEDURE — 97535 SELF CARE MNGMENT TRAINING: CPT | Mod: GO

## 2023-07-28 PROCEDURE — 70450 CT HEAD/BRAIN W/O DYE: CPT | Mod: 26,MA,59

## 2023-07-28 PROCEDURE — 83036 HEMOGLOBIN GLYCOSYLATED A1C: CPT

## 2023-07-28 PROCEDURE — 85025 COMPLETE CBC W/AUTO DIFF WBC: CPT

## 2023-07-28 PROCEDURE — 97162 PT EVAL MOD COMPLEX 30 MIN: CPT | Mod: GP

## 2023-07-28 PROCEDURE — 70498 CT ANGIOGRAPHY NECK: CPT | Mod: 26,MA

## 2023-07-28 PROCEDURE — 80061 LIPID PANEL: CPT

## 2023-07-28 PROCEDURE — 99291 CRITICAL CARE FIRST HOUR: CPT

## 2023-07-28 PROCEDURE — 93010 ELECTROCARDIOGRAM REPORT: CPT | Mod: 77

## 2023-07-28 PROCEDURE — 70496 CT ANGIOGRAPHY HEAD: CPT | Mod: 26,MA

## 2023-07-28 PROCEDURE — 93306 TTE W/DOPPLER COMPLETE: CPT

## 2023-07-28 PROCEDURE — 70551 MRI BRAIN STEM W/O DYE: CPT

## 2023-07-28 RX ORDER — HEPARIN SODIUM 5000 [USP'U]/ML
5000 INJECTION INTRAVENOUS; SUBCUTANEOUS EVERY 12 HOURS
Refills: 0 | Status: DISCONTINUED | OUTPATIENT
Start: 2023-07-28 | End: 2023-08-01

## 2023-07-28 RX ORDER — DEXTROSE 50 % IN WATER 50 %
15 SYRINGE (ML) INTRAVENOUS ONCE
Refills: 0 | Status: DISCONTINUED | OUTPATIENT
Start: 2023-07-28 | End: 2023-08-01

## 2023-07-28 RX ORDER — QUINAPRIL HYDROCHLORIDE 40 MG/1
1 TABLET, FILM COATED ORAL
Qty: 0 | Refills: 0 | DISCHARGE

## 2023-07-28 RX ORDER — ATORVASTATIN CALCIUM 80 MG/1
80 TABLET, FILM COATED ORAL AT BEDTIME
Refills: 0 | Status: DISCONTINUED | OUTPATIENT
Start: 2023-07-28 | End: 2023-08-01

## 2023-07-28 RX ORDER — CLOPIDOGREL BISULFATE 75 MG/1
75 TABLET, FILM COATED ORAL DAILY
Refills: 0 | Status: DISCONTINUED | OUTPATIENT
Start: 2023-07-28 | End: 2023-08-01

## 2023-07-28 RX ORDER — SODIUM CHLORIDE 9 MG/ML
1000 INJECTION, SOLUTION INTRAVENOUS
Refills: 0 | Status: DISCONTINUED | OUTPATIENT
Start: 2023-07-28 | End: 2023-08-01

## 2023-07-28 RX ORDER — INSULIN LISPRO 100/ML
VIAL (ML) SUBCUTANEOUS AT BEDTIME
Refills: 0 | Status: DISCONTINUED | OUTPATIENT
Start: 2023-07-28 | End: 2023-08-01

## 2023-07-28 RX ORDER — DEXTROSE 50 % IN WATER 50 %
25 SYRINGE (ML) INTRAVENOUS ONCE
Refills: 0 | Status: DISCONTINUED | OUTPATIENT
Start: 2023-07-28 | End: 2023-08-01

## 2023-07-28 RX ORDER — HYDRALAZINE HCL 50 MG
5 TABLET ORAL EVERY 6 HOURS
Refills: 0 | Status: DISCONTINUED | OUTPATIENT
Start: 2023-07-28 | End: 2023-07-29

## 2023-07-28 RX ORDER — ASPIRIN/CALCIUM CARB/MAGNESIUM 324 MG
81 TABLET ORAL DAILY
Refills: 0 | Status: DISCONTINUED | OUTPATIENT
Start: 2023-07-28 | End: 2023-08-01

## 2023-07-28 RX ORDER — PANTOPRAZOLE SODIUM 20 MG/1
40 TABLET, DELAYED RELEASE ORAL
Refills: 0 | Status: DISCONTINUED | OUTPATIENT
Start: 2023-07-28 | End: 2023-08-01

## 2023-07-28 RX ORDER — GLUCAGON INJECTION, SOLUTION 0.5 MG/.1ML
1 INJECTION, SOLUTION SUBCUTANEOUS ONCE
Refills: 0 | Status: DISCONTINUED | OUTPATIENT
Start: 2023-07-28 | End: 2023-08-01

## 2023-07-28 RX ORDER — LISINOPRIL 2.5 MG/1
5 TABLET ORAL DAILY
Refills: 0 | Status: DISCONTINUED | OUTPATIENT
Start: 2023-07-28 | End: 2023-07-28

## 2023-07-28 RX ORDER — INSULIN LISPRO 100/ML
VIAL (ML) SUBCUTANEOUS
Refills: 0 | Status: DISCONTINUED | OUTPATIENT
Start: 2023-07-28 | End: 2023-08-01

## 2023-07-28 RX ORDER — SODIUM CHLORIDE 9 MG/ML
1000 INJECTION INTRAMUSCULAR; INTRAVENOUS; SUBCUTANEOUS
Refills: 0 | Status: DISCONTINUED | OUTPATIENT
Start: 2023-07-28 | End: 2023-07-29

## 2023-07-28 RX ORDER — DEXTROSE 50 % IN WATER 50 %
12.5 SYRINGE (ML) INTRAVENOUS ONCE
Refills: 0 | Status: DISCONTINUED | OUTPATIENT
Start: 2023-07-28 | End: 2023-08-01

## 2023-07-28 RX ADMIN — HEPARIN SODIUM 5000 UNIT(S): 5000 INJECTION INTRAVENOUS; SUBCUTANEOUS at 18:27

## 2023-07-28 RX ADMIN — SODIUM CHLORIDE 75 MILLILITER(S): 9 INJECTION INTRAMUSCULAR; INTRAVENOUS; SUBCUTANEOUS at 22:22

## 2023-07-28 RX ADMIN — ATORVASTATIN CALCIUM 80 MILLIGRAM(S): 80 TABLET, FILM COATED ORAL at 22:22

## 2023-07-28 NOTE — H&P ADULT - REASON FOR ADMISSION
r/o CVA Post-Care Instructions: I reviewed with the patient in detail post-care instructions. Patient is to wear sunprotection, and avoid picking at any of the treated lesions. Pt may apply Vaseline to crusted or scabbing areas. Treatment Number (Will Not Render If 0): 1 Medical Necessity Information: It is in your best interest to select a reason for this procedure from the list below. All of these items fulfill various CMS LCD requirements except the new and changing color options. Detail Level: Simple Render Note In Bullet Format When Appropriate: No Consent: The patient's consent was obtained including but not limited to risks of crusting, scabbing, blistering, scarring, darker or lighter pigmentary change, recurrence, incomplete removal and infection. Anesthesia Volume In Cc: 0.2 Medical Necessity Clause: This procedure was medically necessary because the lesions that were treated were:

## 2023-07-28 NOTE — H&P ADULT - NSHPLABSRESULTS_GEN_ALL_CORE
12.0   5.82  )-----------( 171      ( 28 Jul 2023 13:05 )             36.9       07-28    139  |  102  |  14  ----------------------------<  143<H>  4.2   |  26  |  0.9    Ca    9.6      28 Jul 2023 13:05    TPro  6.9  /  Alb  4.6  /  TBili  0.3  /  DBili  x   /  AST  23  /  ALT  17  /  AlkPhos  70  07-28              Urinalysis Basic - ( 28 Jul 2023 13:05 )    Color: x / Appearance: x / SG: x / pH: x  Gluc: 143 mg/dL / Ketone: x  / Bili: x / Urobili: x   Blood: x / Protein: x / Nitrite: x   Leuk Esterase: x / RBC: x / WBC x   Sq Epi: x / Non Sq Epi: x / Bacteria: x        PT/INR - ( 28 Jul 2023 13:05 )   PT: 11.40 sec;   INR: 1.00 ratio         PTT - ( 28 Jul 2023 13:05 )  PTT:30.7 sec    Lactate Trend      CARDIAC MARKERS ( 28 Jul 2023 13:05 )  x     / <0.01 ng/mL / x     / x     / x            CAPILLARY BLOOD GLUCOSE      POCT Blood Glucose.: 145 mg/dL (28 Jul 2023 12:35)

## 2023-07-28 NOTE — ED PROVIDER NOTE - INPATIENT RECORD SUMMARY
Inpatient record from June 16, 2023 reviewed.  Patient presented with dizziness and slurred speech.  Stroke code was called.  No intervention performed.  She has a past medical history of hypertension hyperlipidemia coronary artery disease.  She is on aspirin, Plavix, Lipitor, glimepiride, quinapril for these conditions.

## 2023-07-28 NOTE — H&P ADULT - ATTENDING COMMENTS
Pls see changes made above.    89 yo female who recently was discharged in june 2023 with a similar presentation, presented with dizziness "room spinning" at 6 am this morning.  She reports worsening left lower extremity weakness, who unlike previous episode in june, pt denies any aphasia this episode.       #Stroke like Sx, rule out acute CVA.   - admit to stepdown.   - Pt with dizziness and unstable  - Pt had similar episode last month was admitted, suspected secondary to TIA> pt was sent home MCOT and Neurology follow up  CT Angio Neck Stroke Protocol w/ IV Cont  1.  No evidence of acute large vessel occlusion. Normal perfusion images.  2.  Stable exam since 6/14/23 with calcific plaque at the ICA origins   producing mild (< 50%) stenosis.  CT Brain Stroke Protocol   1.  No evidence of acute intracranial hemorrhage or large territory   infarct. Stable exam since 6/14/2023.  2.  Stable chronic microvascular changes and multiple chronic infarcts.  - Check Lipid profile, TSH, A1C  - c/w Lipitor, ASA and Plavix for now  - Neurology consult, neuro checks  Q4H per tele neurology   - PT/OT once stable  - fall precautions  - TTE 6/2023: EF 72%, Mild MR, Mild TR  - not a candidate for TNK out of window   - last month ECHO done without bubble study, obtain ECHO with bubble study , obtain MRI of brain.   - permission HTN for 1 day.  NS IVF x 1 day  - consult PT/OT/SLP     # DLD, CAD s/p stent  - c/w asa, plavix, statin     #HTN  - hold quinapril   - permissive HTN x 1 day    #DMT2   - Home: Glimepiride   - Check A1C  - SSI ac hs.   - Avoid Hypoglycemia  - Target FS (140-180)    #Hx/o breast Ca s/p mastectomy   - in remission.     DVT Ppx: heparin SQ   GI Ppx: Pantoprazole 40mg PO QD   Diet: DASH/cc  Activity: IAT  Dispo: From Home    Full code

## 2023-07-28 NOTE — ED ADULT NURSE NOTE - NSFALLHARMRISKINTERV_ED_ALL_ED
Assistance OOB with selected safe patient handling equipment if applicable/Assistance with ambulation/Communicate risk of Fall with Harm to all staff, patient, and family/Monitor gait and stability/Provide patient with walking aids/Provide visual cue: red socks, yellow wristband, yellow gown, etc/Reinforce activity limits and safety measures with patient and family/Use of alarms - bed, stretcher, chair and/or video monitoring/Bed in lowest position, wheels locked, appropriate side rails in place/Call bell, personal items and telephone in reach/Instruct patient to call for assistance before getting out of bed/chair/stretcher/Non-slip footwear applied when patient is off stretcher/Schneider to call system/Physically safe environment - no spills, clutter or unnecessary equipment/Purposeful Proactive Rounding/Room/bathroom lighting operational, light cord in reach

## 2023-07-28 NOTE — H&P ADULT - HISTORY OF PRESENT ILLNESS
88 year old female with pmhx of htn, hld, cad with stent, on plavix, prior CVA on asa, presents with dizziness upon awakening this morning. pt describes it as room spinning sensation, having difficulty ambulating without holding on. Pt was admitted to hospital 6/14 for similar episode, CTH and MRI were NG for acute findings, Pt discharged with [facility] and with instruction to f/u with Neurology team for work up of TIA.  pt denies ha, loc, visual changes, slurred speech, paresthesias, weakness, chest pain, sob, abd pain or nausea, vomiting, diarrhea.    In ED,  VT bp 143/85, HR 73, RR 30, SpO2 97% RA, T 96.5 F  Labs unremarkable    CT Angio Neck Stroke Protocol w/ IV Cont  1.  No evidence of acute large vessel occlusion. Normal perfusion images.  2.  Stable exam since 6/14/23 with calcific plaque at the ICA origins   producing mild (< 50%) stenosis.    CT Brain Stroke Protocol   1.  No evidence of acute intracranial hemorrhage or large territory   infarct. Stable exam since 6/14/2023.  2.  Stable chronic microvascular changes and multiple chronic infarcts.    Case discussed with telestroke neurology.  No intervention at this time.  Recommends every 4 hour neuro checks in a monitored setting. 88 year old female with pmhx of HTN, DLD, CAD on plavix, prior CVA with residual L sided weakness on asa, presents with dizziness upon awakening this morning. pt describes it as room spinning sensation, having difficulty ambulating without holding on and could not orient herself to pick things up. Pt was admitted to hospital 6/14 for similar episode, CTH and MRI were NG for acute findings, Pt discharged with [facility] and with instruction to f/u with Neurology team for work up of TIA. This time pt denies ha, loc, visual changes, slurred speech, paresthesias, weakness, chest pain, sob, abd pain or nausea, vomiting, diarrhea.    In ED,  VT bp 143/85, HR 73, RR 30, SpO2 97% RA, T 96.5 F  Labs unremarkable    CT Angio Neck Stroke Protocol w/ IV Cont  1.  No evidence of acute large vessel occlusion. Normal perfusion images.  2.  Stable exam since 6/14/23 with calcific plaque at the ICA origins   producing mild (< 50%) stenosis.    CT Brain Stroke Protocol   1.  No evidence of acute intracranial hemorrhage or large territory   infarct. Stable exam since 6/14/2023.  2.  Stable chronic microvascular changes and multiple chronic infarcts.    Case discussed with telestroke neurology.  No intervention at this time.  Recommends every 4 hour neuro checks in a monitored setting. 88 year old female with pmhx of HTN, DLD, CAD on plavix, prior CVA with residual milde L sided weakness on asa and plavix, presents with dizziness upon awakening this morning around 6 am, pt describes it as room spinning sensation, having difficulty ambulating without holding on and could not orient herself to pick things up. Pt was admitted to hospital 6/14 for similar episode, CTH and MRI Brain. were NG for acute findings, Pt discharged with [facility] and with instruction to f/u with Neurology team for work up of TIA. This time pt denies ha, loc, visual changes, slurred speech, paresthesias, , chest pain, sob, abd pain or nausea, vomiting, diarrhea.  Previous episode last admission pt reported aphasia, however this admission pt denies aphasia, but reports increasing weakness in LLE.  Patient denies any recent URI.     In ED,  VT bp 143/85, HR 73, RR 30, SpO2 97% RA, T 96.5 F  Labs unremarkable    CT Angio Neck Stroke Protocol w/ IV Cont  1.  No evidence of acute large vessel occlusion. Normal perfusion images.  2.  Stable exam since 6/14/23 with calcific plaque at the ICA origins   producing mild (< 50%) stenosis.    CT Brain Stroke Protocol   1.  No evidence of acute intracranial hemorrhage or large territory   infarct. Stable exam since 6/14/2023.  2.  Stable chronic microvascular changes and multiple chronic infarcts.    Case discussed with telestroke neurology.  No intervention at this time.  Recommends every 4 hour neuro checks in a monitored setting.

## 2023-07-28 NOTE — CONSULT NOTE ADULT - ASSESSMENT
IMPRESSION: Rehab of TIA, R/O CVA, left hemiparesis. It appears her symptoms where transient. PT/OT to see.    PRECAUTIONS: [ x ] Cardiac  [  ] Respiratory  [  ] Seizures [  ] Contact Isolation  [  ] Droplet Isolation  [  ] Other    Weight Bearing Status:     RECOMMENDATION:    Out of Bed to Chair     DVT/Decubiti Prophylaxis    REHAB PLAN:     [ x  ] Bedside P/T 3-5 times a week   [ x  ]   Bedside O/T  2-3 times a week             [   ] No Rehab Therapy Indicated                   [   ]  Speech Therapy   Conditioning/ROM                                    ADL  Bed Mobility                                               Conditioning/ROM  Transfers                                                     Bed Mobility  Sitting /Standing Balance                         Transfers                                        Gait Training                                               Sitting/Standing Balance  Stair Training [   ]Applicable                    Home equipment Eval                                                                        Splinting  [   ] Only      GOALS:   ADL   [ x  ]   Independent                    Transfers  [ x] Independent                          Ambulation  [ x  ] Independent     [x    ] With device                            [   ]  CG                                                         [   ]  CG                                                                  [   ] CG                            [    ] Min A                                                   [   ] Min A                                                              [   ] Min  A          DISCHARGE PLAN:   [   ]  Good candidate for Intensive Rehabilitation/Hospital based-4A [facility]                                             Will tolerate 3hrs Intensive Rehab Daily                                       [  x  ]  Short Term Rehab in Skilled Nursing Facility                                                              VS                                     [   x ]  Home with Outpatient or VN services                                         [    ]  Possible Candidate for Intensive Hospital based Rehab                                        IMPRESSION: Rehab of TIA, R/O CVA, left hemiparesis. It appears her symptoms where transient. PT/OT to see. If given an antihypertensive, I suggest dosing at bedtime to minimize side effects.     PRECAUTIONS: [ x ] Cardiac  [  ] Respiratory  [  ] Seizures [  ] Contact Isolation  [  ] Droplet Isolation  [  ] Other    Weight Bearing Status:     RECOMMENDATION:    Out of Bed to Chair     DVT/Decubiti Prophylaxis    REHAB PLAN:     [ x  ] Bedside P/T 3-5 times a week   [ x  ]   Bedside O/T  2-3 times a week             [   ] No Rehab Therapy Indicated                   [   ]  Speech Therapy   Conditioning/ROM                                    ADL  Bed Mobility                                               Conditioning/ROM  Transfers                                                     Bed Mobility  Sitting /Standing Balance                         Transfers                                        Gait Training                                               Sitting/Standing Balance  Stair Training [   ]Applicable                    Home equipment Eval                                                                        Splinting  [   ] Only      GOALS:   ADL   [ x  ]   Independent                    Transfers  [ x] Independent                          Ambulation  [ x  ] Independent     [x    ] With device                            [   ]  CG                                                         [   ]  CG                                                                  [   ] CG                            [    ] Min A                                                   [   ] Min A                                                              [   ] Min  A          DISCHARGE PLAN:   [   ]  Good candidate for Intensive Rehabilitation/Hospital based-4A [facility]                                             Will tolerate 3hrs Intensive Rehab Daily                                       [  x  ]  Short Term Rehab in Skilled Nursing Facility                                                              VS                                     [   x ]  Home with Outpatient or VN services                                         [    ]  Possible Candidate for Intensive Hospital based Rehab

## 2023-07-28 NOTE — H&P ADULT - ASSESSMENT
88 year old female with pmhx of htn, hld, cad with stent, on plavix, prior CVA on asa, presents with dizziness upon awakening this morning. pt describes it as room spinning sensation, having difficulty ambulating without holding on. Pt was admitted to hospital 6/14 for similar episode, CTH and MRI were NG for acute findings, Pt discharged with [facility] and with instruction to f/u with Neurology team for work up of TIA.  pt denies ha, loc, visual changes, slurred speech, paresthesias, weakness, chest pain, sob, abd pain or nausea, vomiting, diarrhea.    In ED,  VT bp 143/85, HR 73, RR 30, SpO2 97% RA, T 96.5 F  Labs unremarkable    CT Angio Neck Stroke Protocol w/ IV Cont  1.  No evidence of acute large vessel occlusion. Normal perfusion images.  2.  Stable exam since 6/14/23 with calcific plaque at the ICA origins   producing mild (< 50%) stenosis.    CT Brain Stroke Protocol   1.  No evidence of acute intracranial hemorrhage or large territory   infarct. Stable exam since 6/14/2023.  2.  Stable chronic microvascular changes and multiple chronic infarcts.    Case discussed with telestroke neurology.  No intervention at this time.  Recommends every 4 hour neuro checks in a monitored setting.    #r/o CVA, TIA  - Pt with dizziness and unstable  - Pt had similar episode month, suspected secondary to TIA> pt was sent home [facility] and Neurology follow up  - Check Lipid profile, TSH, A1C  - c/w Lipitor, ASA and Plavix for now  - Neurology consult  - PT/OT once stable  - fall precautions  - TTE 6/2023: EF 72%, Mild MR, Mild TR    #Hx of HTN, DLD, CAD s/p stent  - c/w Home medications    DVT Ppx: Lovenox 40mg QD  GI Ppx: Pantoprazole 40mg PO QD   Diet: DASH  Activity: IAT  Dispo: From Home     88 year old female with pmhx of HTN, DLD, CAD on plavix, prior CVA with residual L sided weakness on asa, presents with dizziness upon awakening this morning.     Case discussed with telestroke neurology.  No intervention at this time.  Recommends every 4 hour neuro checks in a monitored setting.    #r/o CVA, TIA  - Pt with dizziness and unstable  - Pt had similar episode month, suspected secondary to TIA> pt was sent home [facility] and Neurology follow up  CT Angio Neck Stroke Protocol w/ IV Cont  1.  No evidence of acute large vessel occlusion. Normal perfusion images.  2.  Stable exam since 6/14/23 with calcific plaque at the ICA origins   producing mild (< 50%) stenosis.  CT Brain Stroke Protocol   1.  No evidence of acute intracranial hemorrhage or large territory   infarct. Stable exam since 6/14/2023.  2.  Stable chronic microvascular changes and multiple chronic infarcts.  - Check Lipid profile, TSH, A1C  - c/w Lipitor, ASA and Plavix for now  - Neurology consult  - PT/OT once stable  - fall precautions  - TTE 6/2023: EF 72%, Mild MR, Mild TR    #Hx of HTN, DLD, CAD s/p stent  - c/w Home medications    #DM  - Home: Glimepiride   - Check A1C  - Monitor FS and start Insulin SS, BB accordingly  - Avoid Hypoglycemia  - Target FS (140-180)    DVT Ppx: Lovenox 40mg QD  GI Ppx: Pantoprazole 40mg PO QD   Diet: DASH/cc  Activity: IAT  Dispo: From Home    Med rec done as per last admission, please confirm with her pharmacy [facility] on [address] 88 year old female with pmhx of HTN, DLD, CAD on plavix, prior CVA with residual L sided weakness on asa, presents with dizziness upon awakening this morning.     Case discussed with telestroke neurology.  No intervention at this time.  Recommends every 4 hour neuro checks in a monitored setting.    #Stroke like Sx, rule out acute CVA.   - Pt with dizziness and unstable  - Pt had similar episode last month was admitted, suspected secondary to TIA> pt was sent home [facility] and Neurology follow up  CT Angio Neck Stroke Protocol w/ IV Cont  1.  No evidence of acute large vessel occlusion. Normal perfusion images.  2.  Stable exam since 6/14/23 with calcific plaque at the ICA origins   producing mild (< 50%) stenosis.  CT Brain Stroke Protocol   1.  No evidence of acute intracranial hemorrhage or large territory   infarct. Stable exam since 6/14/2023.  2.  Stable chronic microvascular changes and multiple chronic infarcts.  - Check Lipid profile, TSH, A1C  - c/w Lipitor, ASA and Plavix for now  - Neurology consult, neuro checks  Q4H per tele neurology   - PT/OT once stable  - fall precautions  - TTE 6/2023: EF 72%, Mild MR, Mild TR  - not a candidate for TNK out of window   - last month ECHO done without bubble study, obtain ECHO with bubble study   - permission HTN for 1 day.  NS IVF x 1 day  - consult PT/OT/SLP     # DLD, CAD s/p stent  - c/w asa, plavix, statin     #HTN  - hold quinapril   - permissive HTN x 1 day    #DMT2   - Home: Glimepiride   - Check A1C  - SSI ac hs.   - Avoid Hypoglycemia  - Target FS (140-180)    #Hx/o breast Ca s/p mastectomy   - in remission.     DVT Ppx: heparin SQ   GI Ppx: Pantoprazole 40mg PO QD   Diet: DASH/cc  Activity: IAT  Dispo: From Home    Med rec done as per last admission, please confirm with her pharmacy [facility] on [address]

## 2023-07-28 NOTE — H&P ADULT - NSHPPHYSICALEXAM_GEN_ALL_CORE
GENERAL: NAD, Resting in bed  HEENT: NCAT  CHEST/LUNG: Clear to auscultation bilaterally; No wheezing or rubs.   HEART: Regular rate and rhythm; No murmurs, rubs, or gallops  ABDOMEN: Bowel sounds present; Soft, Nontender, Nondistended.   EXTREMITIES:  No clubbing, cyanosis, or edema  NERVOUS SYSTEM:  Alert & Oriented X3  MSK: FROM all 4 extremities, full and equal strength  SKIN: No rashes or lesions GENERAL: NAD, Resting in bed  HEENT: NCAT  CHEST/LUNG: Clear to auscultation bilaterally; No wheezing or rubs.   HEART: Regular rate and rhythm; No murmurs, rubs, or gallops  ABDOMEN: Bowel sounds present; Soft, Nontender, Nondistended.   EXTREMITIES:  No clubbing, cyanosis, or edema  NERVOUS SYSTEM:  Alert & Oriented X3, MS 5/5 all extremities except LLE 3/5, sensation in intact, CN II-XII intact.   MSK: FROM all 4 extremities,  SKIN: No rashes or lesions

## 2023-07-28 NOTE — CONSULT NOTE ADULT - SUBJECTIVE AND OBJECTIVE BOX
HPI:  88 year old female with pmhx of HTN, DLD, CAD on plavix, prior CVA with residual L sided weakness on asa, presents with dizziness upon awakening this morning. pt describes it as room spinning sensation, having difficulty ambulating without holding on and could not orient herself to pick things up. Pt was admitted to hospital 6/14 for similar episode, CTH and MRI were NG for acute findings, Pt discharged with [facility] and with instruction to f/u with Neurology team for work up of TIA. This time pt denies ha, loc, visual changes, slurred speech, paresthesias, weakness, chest pain, sob, abd pain or nausea, vomiting, diarrhea.    In ED,  VT bp 143/85, HR 73, RR 30, SpO2 97% RA, T 96.5 F  Labs unremarkable    CT Angio Neck Stroke Protocol w/ IV Cont  1.  No evidence of acute large vessel occlusion. Normal perfusion images.  2.  Stable exam since 6/14/23 with calcific plaque at the ICA origins   producing mild (< 50%) stenosis.    CT Brain Stroke Protocol   1.  No evidence of acute intracranial hemorrhage or large territory   infarct. Stable exam since 6/14/2023.  2.  Stable chronic microvascular changes and multiple chronic infarcts.    Case discussed with telestroke neurology.  No intervention at this time.  Recommends every 4 hour neuro checks in a monitored setting. (28 Jul 2023 15:29)      PAST MEDICAL & SURGICAL HISTORY:  DM (diabetes mellitus)      HTN (hypertension)      Vertigo      CVA (cerebral vascular accident)      Breast cancer      H/O mastectomy  right          Hospital Course:    TODAY'S SUBJECTIVE & REVIEW OF SYMPTOMS:     Constitutional WNL   Cardio WNL   Resp WNL   GI WNL  Heme WNL  Endo WNL  Skin WNL  MSK WNL  Neuro WNL  Cognitive WNL  Psych WNL      MEDICATIONS  (STANDING):  aspirin  chewable 81 milliGRAM(s) Oral daily  atorvastatin 80 milliGRAM(s) Oral at bedtime  clopidogrel Tablet 75 milliGRAM(s) Oral daily  heparin   Injectable 5000 Unit(s) SubCutaneous every 12 hours    MEDICATIONS  (PRN):      FAMILY HISTORY:  No pertinent family history in first degree relatives        Allergies    codeine (Hives; Urticaria)    Intolerances        SOCIAL HISTORY:    [  ] Etoh  [  ] Smoking  [  ] Substance abuse     Home Environment:  [ x ] Home Alone  [  ] Lives with Family  [  x] Home Health Aid-none    Dwelling:  [x  ] condo  [  ] Private House  [  ] Adult Home  [  ] Skilled Nursing Facility      [  ] Short Term  [  ] Long Term  [x  ] no Stairs;walk-in       Elevator [  ]    FUNCTIONAL STATUS PTA: (Check all that apply)  Ambulation: [ x  ]Independent    [  ] Dependent     [  ] Non-Ambulatory  Assistive Device: [  ] SA Cane  [  ]  Q Cane  [ x ] Walker  [  ]  Wheelchair  ADL : [ x ] Independent  simple ADLs[  ]  Dependent       Vital Signs Last 24 Hrs  T(C): 35.8 (28 Jul 2023 15:00), Max: 36.7 (28 Jul 2023 14:34)  T(F): 96.5 (28 Jul 2023 15:00), Max: 98 (28 Jul 2023 14:34)  HR: 60 (28 Jul 2023 18:32) (60 - 90)  BP: 153/67 (28 Jul 2023 18:32) (143/85 - 170/79)  BP(mean): 97 (28 Jul 2023 18:32) (97 - 97)  RR: 22 (28 Jul 2023 18:32) (17 - 30)  SpO2: 98% (28 Jul 2023 18:32) (97% - 99%)    Parameters below as of 28 Jul 2023 15:00  Patient On (Oxygen Delivery Method): room air          PHYSICAL EXAM: Alert & Oriented X3  GENERAL: NAD, well-groomed, well-developed  HEAD:  Atraumatic, Normocephalic  EYES: EOMI, PERRLA, conjunctiva and sclera clear  NECK: Supple, No JVD, Normal thyroid  CHEST/LUNG: Clear bilaterally; No rales, rhonchi, wheezing, or rubs  HEART: Regular rate and rhythm; No murmurs, rubs, or gallops  ABDOMEN: Soft, Nontender, Nondistended; Bowel sounds present  EXTREMITIES:  2+ Peripheral Pulses, No clubbing, cyanosis, or edema    NERVOUS SYSTEM:  Cranial Nerves 2-12 intact [x  ] Abnormal  [  ]  ROM: WFL all extremities [x  ]  Abnormal [  ]  Motor Strength: WFL all extremities  [  ]  Abnormal [ x ] LUE 4+/5; LLE 4-/5   Sensation: intact to light touch [x  ] Abnormal [  ]  Reflexes: Symmetric [ x ]  Abnormal [  ]    FUNCTIONAL STATUS:  Bed Mobility: Independent [  ]  Supervision [  ]  Needs Assistance [  ]  N/A [  ]  Transfers: Independent [  ]  Supervision [  ]  Needs Assistance [  ]  N/A [  ]   Ambulation: Independent [  ]  Supervision [  ]  Needs Assistance [  ]  N/A [  ]  ADL: Independent [  ] Requires Assistance [  ] N/A [  ]      LABS:                        12.0   5.82  )-----------( 171      ( 28 Jul 2023 13:05 )             36.9     07-28    139  |  102  |  14  ----------------------------<  143<H>  4.2   |  26  |  0.9    Ca    9.6      28 Jul 2023 13:05    TPro  6.9  /  Alb  4.6  /  TBili  0.3  /  DBili  x   /  AST  23  /  ALT  17  /  AlkPhos  70  07-28    PT/INR - ( 28 Jul 2023 13:05 )   PT: 11.40 sec;   INR: 1.00 ratio         PTT - ( 28 Jul 2023 13:05 )  PTT:30.7 sec  Urinalysis Basic - ( 28 Jul 2023 13:05 )    Color: x / Appearance: x / SG: x / pH: x  Gluc: 143 mg/dL / Ketone: x  / Bili: x / Urobili: x   Blood: x / Protein: x / Nitrite: x   Leuk Esterase: x / RBC: x / WBC x   Sq Epi: x / Non Sq Epi: x / Bacteria: x        RADIOLOGY & ADDITIONAL STUDIES:    Assesment:

## 2023-07-28 NOTE — ED PROVIDER NOTE - NIH STROKE SCALE: 5A. MOTOR ARM, LEFT, QM
HEARING AID CHECK    Name:  Ubaldo Miranda  :  1969  Age:  54 y.o.  Date of Evaluation:  5/10/2023      HISTORY    Reason for visit:  Ubaldo Miranda is seen today for a hearing aid check.  Patient reports he is due for a 6 month tubing change.  He states he is not having any problems with his hearing aids.    Hearing aid history:  Patient is currently wearing a Behind the Ear (BTE) with custom ear mold hearing aid in left ear ear(s). and CROS BTE with slim tubing in right ear.    OFFICE VISIT    During today's visit tubing was changed on his left hearing aid.  Tubing was changed on his right CROS aid using a #3 length tube and medium open dome.  He reported the fit was comfortable and the sound was good.  A Level I office charge of $25.00 was paid at this time and collected in [facility].  He will return in 6 months for another tubing change, or sooner if he needs further assistance.     It was a pleasure seeing Ubaldo Mirnada in Audiology today.  It is a pleasure helping Mr. Miranda with his amplification needs.             This document has been electronically signed by Marie Rdz MS CCC-A on May 10, 2023 09:56 CDT       Marie Rdz MS CCC-A  Licensed Audiologist    For Billing and Codin  Hearing Aid Check, Binaural - no charge    
(0) No drift; limb holds 90 (or 45) degrees for full 10 secs

## 2023-07-28 NOTE — ED ADULT TRIAGE NOTE - CHIEF COMPLAINT QUOTE
Pt brought in by ambulance from home. "I woke up this morning to go to the bathroom. I felt dizzy and off balance." Pt advises she woke up at 6 a.m. She was fine when she went to bed last night at 9:30 p.m. Pt has history of TIAs with last TIA one month ago.

## 2023-07-28 NOTE — PATIENT PROFILE ADULT - FALL HARM RISK - HARM RISK INTERVENTIONS
Assistance with ambulation/Assistance OOB with selected safe patient handling equipment/Communicate Risk of Fall with Harm to all staff/Monitor gait and stability/Reinforce activity limits and safety measures with patient and family/Sit up slowly, dangle for a short time, stand at bedside before walking/Tailored Fall Risk Interventions/Visual Cue: Yellow wristband and red socks/Bed in lowest position, wheels locked, appropriate side rails in place/Call bell, personal items and telephone in reach/Instruct patient to call for assistance before getting out of bed or chair/Non-slip footwear when patient is out of bed/[place] to call system/Physically safe environment - no spills, clutter or unnecessary equipment/Purposeful Proactive Rounding/Room/bathroom lighting operational, light cord in reach

## 2023-07-28 NOTE — ED PROVIDER NOTE - CRITICAL CARE ATTENDING CONTRIBUTION TO CARE
Patient went to bed last night at 930 without any symptoms.  She woke up complaining of dizziness and walking off balance.  Wake up time  around 11:00 today.   Patient denies change in vision, change in speech, change in motor or sensory function.  Patient denies fever, trauma.  Vital signs noted.  No apparent distress.  Chest clear.  Heart regular rate.  Abdomen nontender.  Extremity equal pulses.  Neuro positive Hallpike maneuver.  Negative nystagmus.  See NIH stroke scale.  Patient can ambulate with assistance in the ED with a shuffling gait.  According to son who was present in the ED, this is close to her baseline gait.  Case discussed with telestroke neurology.  No intervention at this time.  Recommends every 4 hour neuro checks in a monitored setting.    I personally saw the patient. GEMINI Oneill and I provided critical care for a total of 45 minutes. I provided a substantive portion of the care and the majority of the critical care time.

## 2023-07-28 NOTE — ED PROVIDER NOTE - PROGRESS NOTE DETAILS
Kolby: I accompanied the patient to the CT suite.  I reviewed the CT and real images.  I independently interpreted them as no hemorrhage.  This aided my medical decision making. Kolby: Discussed with radiologist.  No hemorrhage seen. Kolby: Discussed with telestroke.  No intervention at this time.  Needs admission for every 4 hour neurochecks.

## 2023-07-28 NOTE — ED PROVIDER NOTE - OBJECTIVE STATEMENT
88 year old female with pmhx of htn, hld, cad with stent, on plavix, cva on asa, presents with dizziness upon awakening this morning. pt describes it as room spinning sensation, having difficulty ambulating without holding on. Pt was admitted to hospital 7/14 for tia, had full work up by neurology. pt denies ha, loc, visual changes, slurred speech, paresthesias, weakness, chest pain, sob, abd pain or nausea, vomiting, diarrhea.

## 2023-07-29 LAB
A1C WITH ESTIMATED AVERAGE GLUCOSE RESULT: 8 % — HIGH (ref 4–5.6)
ALBUMIN SERPL ELPH-MCNC: 4 G/DL — SIGNIFICANT CHANGE UP (ref 3.5–5.2)
ALP SERPL-CCNC: 59 U/L — SIGNIFICANT CHANGE UP (ref 30–115)
ALT FLD-CCNC: 14 U/L — SIGNIFICANT CHANGE UP (ref 0–41)
ANION GAP SERPL CALC-SCNC: 9 MMOL/L — SIGNIFICANT CHANGE UP (ref 7–14)
AST SERPL-CCNC: 21 U/L — SIGNIFICANT CHANGE UP (ref 0–41)
BASOPHILS # BLD AUTO: 0.02 K/UL — SIGNIFICANT CHANGE UP (ref 0–0.2)
BASOPHILS NFR BLD AUTO: 0.4 % — SIGNIFICANT CHANGE UP (ref 0–1)
BILIRUB SERPL-MCNC: 0.3 MG/DL — SIGNIFICANT CHANGE UP (ref 0.2–1.2)
BUN SERPL-MCNC: 12 MG/DL — SIGNIFICANT CHANGE UP (ref 10–20)
CALCIUM SERPL-MCNC: 9.5 MG/DL — SIGNIFICANT CHANGE UP (ref 8.4–10.5)
CHLORIDE SERPL-SCNC: 107 MMOL/L — SIGNIFICANT CHANGE UP (ref 98–110)
CHOLEST SERPL-MCNC: 208 MG/DL — HIGH
CO2 SERPL-SCNC: 24 MMOL/L — SIGNIFICANT CHANGE UP (ref 17–32)
CREAT SERPL-MCNC: 0.8 MG/DL — SIGNIFICANT CHANGE UP (ref 0.7–1.5)
EGFR: 71 ML/MIN/1.73M2 — SIGNIFICANT CHANGE UP
EOSINOPHIL # BLD AUTO: 0.07 K/UL — SIGNIFICANT CHANGE UP (ref 0–0.7)
EOSINOPHIL NFR BLD AUTO: 1.3 % — SIGNIFICANT CHANGE UP (ref 0–8)
ESTIMATED AVERAGE GLUCOSE: 183 MG/DL — HIGH (ref 68–114)
GLUCOSE BLDC GLUCOMTR-MCNC: 125 MG/DL — HIGH (ref 70–99)
GLUCOSE BLDC GLUCOMTR-MCNC: 167 MG/DL — HIGH (ref 70–99)
GLUCOSE BLDC GLUCOMTR-MCNC: 186 MG/DL — HIGH (ref 70–99)
GLUCOSE SERPL-MCNC: 117 MG/DL — HIGH (ref 70–99)
HCT VFR BLD CALC: 36.7 % — LOW (ref 37–47)
HDLC SERPL-MCNC: 72 MG/DL — SIGNIFICANT CHANGE UP
HGB BLD-MCNC: 11.8 G/DL — LOW (ref 12–16)
IMM GRANULOCYTES NFR BLD AUTO: 0.4 % — HIGH (ref 0.1–0.3)
LIPID PNL WITH DIRECT LDL SERPL: 103 MG/DL — HIGH
LYMPHOCYTES # BLD AUTO: 1.75 K/UL — SIGNIFICANT CHANGE UP (ref 1.2–3.4)
LYMPHOCYTES # BLD AUTO: 32.6 % — SIGNIFICANT CHANGE UP (ref 20.5–51.1)
MAGNESIUM SERPL-MCNC: 1.8 MG/DL — SIGNIFICANT CHANGE UP (ref 1.8–2.4)
MCHC RBC-ENTMCNC: 27.4 PG — SIGNIFICANT CHANGE UP (ref 27–31)
MCHC RBC-ENTMCNC: 32.2 G/DL — SIGNIFICANT CHANGE UP (ref 32–37)
MCV RBC AUTO: 85.3 FL — SIGNIFICANT CHANGE UP (ref 81–99)
MONOCYTES # BLD AUTO: 0.48 K/UL — SIGNIFICANT CHANGE UP (ref 0.1–0.6)
MONOCYTES NFR BLD AUTO: 9 % — SIGNIFICANT CHANGE UP (ref 1.7–9.3)
NEUTROPHILS # BLD AUTO: 3.02 K/UL — SIGNIFICANT CHANGE UP (ref 1.4–6.5)
NEUTROPHILS NFR BLD AUTO: 56.3 % — SIGNIFICANT CHANGE UP (ref 42.2–75.2)
NON HDL CHOLESTEROL: 136 MG/DL — HIGH
NRBC # BLD: 0 /100 WBCS — SIGNIFICANT CHANGE UP (ref 0–0)
PLATELET # BLD AUTO: 149 K/UL — SIGNIFICANT CHANGE UP (ref 130–400)
PMV BLD: 8.2 FL — SIGNIFICANT CHANGE UP (ref 7.4–10.4)
POTASSIUM SERPL-MCNC: 4.9 MMOL/L — SIGNIFICANT CHANGE UP (ref 3.5–5)
POTASSIUM SERPL-SCNC: 4.9 MMOL/L — SIGNIFICANT CHANGE UP (ref 3.5–5)
PROT SERPL-MCNC: 6 G/DL — SIGNIFICANT CHANGE UP (ref 6–8)
RBC # BLD: 4.3 M/UL — SIGNIFICANT CHANGE UP (ref 4.2–5.4)
RBC # FLD: 13.4 % — SIGNIFICANT CHANGE UP (ref 11.5–14.5)
SODIUM SERPL-SCNC: 140 MMOL/L — SIGNIFICANT CHANGE UP (ref 135–146)
TRIGL SERPL-MCNC: 164 MG/DL — HIGH
WBC # BLD: 5.36 K/UL — SIGNIFICANT CHANGE UP (ref 4.8–10.8)
WBC # FLD AUTO: 5.36 K/UL — SIGNIFICANT CHANGE UP (ref 4.8–10.8)

## 2023-07-29 PROCEDURE — 70551 MRI BRAIN STEM W/O DYE: CPT | Mod: 26

## 2023-07-29 PROCEDURE — 99233 SBSQ HOSP IP/OBS HIGH 50: CPT

## 2023-07-29 RX ORDER — POLYETHYLENE GLYCOL 3350 17 G/17G
17 POWDER, FOR SOLUTION ORAL DAILY
Refills: 0 | Status: DISCONTINUED | OUTPATIENT
Start: 2023-07-29 | End: 2023-08-01

## 2023-07-29 RX ORDER — SENNA PLUS 8.6 MG/1
2 TABLET ORAL AT BEDTIME
Refills: 0 | Status: DISCONTINUED | OUTPATIENT
Start: 2023-07-29 | End: 2023-08-01

## 2023-07-29 RX ADMIN — SODIUM CHLORIDE 75 MILLILITER(S): 9 INJECTION INTRAMUSCULAR; INTRAVENOUS; SUBCUTANEOUS at 17:23

## 2023-07-29 RX ADMIN — CLOPIDOGREL BISULFATE 75 MILLIGRAM(S): 75 TABLET, FILM COATED ORAL at 16:22

## 2023-07-29 RX ADMIN — HEPARIN SODIUM 5000 UNIT(S): 5000 INJECTION INTRAVENOUS; SUBCUTANEOUS at 17:23

## 2023-07-29 RX ADMIN — Medication 81 MILLIGRAM(S): at 16:23

## 2023-07-29 RX ADMIN — PANTOPRAZOLE SODIUM 40 MILLIGRAM(S): 20 TABLET, DELAYED RELEASE ORAL at 07:01

## 2023-07-29 RX ADMIN — POLYETHYLENE GLYCOL 3350 17 GRAM(S): 17 POWDER, FOR SOLUTION ORAL at 16:23

## 2023-07-29 RX ADMIN — Medication 1: at 16:41

## 2023-07-29 RX ADMIN — HEPARIN SODIUM 5000 UNIT(S): 5000 INJECTION INTRAVENOUS; SUBCUTANEOUS at 05:28

## 2023-07-29 RX ADMIN — SENNA PLUS 2 TABLET(S): 8.6 TABLET ORAL at 21:24

## 2023-07-29 RX ADMIN — ATORVASTATIN CALCIUM 80 MILLIGRAM(S): 80 TABLET, FILM COATED ORAL at 21:23

## 2023-07-29 NOTE — PHYSICAL THERAPY INITIAL EVALUATION ADULT - PREDICTED DURATION OF THERAPY (DAYS/WKS), PT EVAL
07/10/22    Patient ID: Edvin Farah is a 48year old male. Chief Complaint   Patient presents with   â¢ Diabetes     no longer taking metformin,states,that he had liquid drained out of left knee   â¢ Hyperlipidemia   â¢ Knee Pain     left knee,was given an ortho referral,didn't go yet   â¢ Other     has been off work           HPI  Edvin Farah is a nice patient who is here today for ER follow-up as well as follow-up of diabetes and hyperlipidemia    Seen in ER for left knee. That doc gave him a referral from [facility] [place] . Was then seen in follow-up by Dr. Luis Daniel Mcdermott on 5/25 was given a referral for orthopedics. There was some confusion on his part about the orthopedic referral and he has not yet gone. Checks in with job tomorrow. Had MRI left knee 2016  Which showed:IMPRESSION:  1. Small patellofemoral joint effusion. 2.  Mild degenerative changes affecting the proximal tibiofibular joint. Xray from [facility] [place] :possible mild deg change, fairly large effusion  Â   Â This weekend right foot and both upper thighs were swollen. RTW statement was given and is supposed to return to work on 6/17/2022, but states he is unable. Diabetes: only took Metformin for 2 days. Felt stiff knees  Which he thinks it was  from Metformin. I explained that the stiffness of the knee likely was last secondary to metformin. Olvin Mars ALLERGIES:  No Known Allergies      Current Outpatient Medications   Medication Sig Dispense Refill   â¢ atorvastatin (LIPITOR) 20 MG tablet TAKE 1 TABLET BY MOUTH DAILY 90 tablet 1   â¢ diclofenac (VOLTAREN) 50 MG EC tablet Take 50 mg by mouth. â¢ OneTouch Ultra test strip TEST ONCE A DAY     â¢ HYDROcodone-acetaminophen (NORCO) 5-325 MG per tablet Take 1-2 tablets by mouth.      â¢ colchicine (COLCRYS) 0.6 MG tablet TAKE 1 TABLET BY MOUTH EVERY DAY AS NEEDED FOR GOUT PAIN 30 tablet 3   â¢ dorzolamide-timolol (COSOPT) 22.3-6.8 MG/ML ophthalmic solution INSTILL 1 DROP INTO RIGHT EYE TWICE DAILY     â¢ allopurinol (ZYLOPRIM) 300 MG tablet TAKE 1 TABLET BY MOUTH DAILY 90 tablet 3   â¢ DISPENSE Glucose Meter for Blood Glucose Testing Daily New Diabetic Pt 1 each 1   â¢ DISPENSE Glucose Test Lancets for New Diabetic Pt testing one  times per day 100 Stick 3   â¢ DISPENSE Glucose test strips for New Diabetic Pt testing  one times per day 100 strip 3   â¢ colchicine (COLCRYS) 0.6 MG tablet Take 1.2 mg (2 tablets) for the first dose followed by 0.6 mg an hour later. Then once or twice daily until flare resolves. 15 tablet 0   â¢ atorvastatin (LIPITOR) 20 MG tablet TAKE 1 TABLET BY MOUTH   EVERY DAY       No current facility-administered medications for this visit.          Patient Active Problem List    Diagnosis Date Noted   â¢ Mixed hyperlipidemia 07/10/2022     Priority: Low   â¢ Left knee pain 07/10/2022     Priority: Low   â¢ Acute pain of left knee 05/25/2022     Priority: Low   â¢ Type 2 diabetes mellitus without complication, without long-term current use of insulin (CMS/[facility]) 05/25/2022     Priority: Low   â¢ Right lateral epicondylitis 12/10/2018     Priority: Low   â¢ Healthcare maintenance 12/10/2018     Priority: Low   â¢ Need for influenza vaccination 12/10/2018     Priority: Low   â¢ Gout 12/17/2015     Priority: Low   â¢ Other hyperlipidemia 03/19/2010     Priority: Low         Past Medical History:   Diagnosis Date   â¢ Hyperlipidemia          Past Surgical History:   Procedure Laterality Date   â¢ Achilles tendon surgery Right          Family History   Problem Relation Age of Onset   â¢ Hyperlipidemia Mother          Social History     Socioeconomic History   â¢ Marital status: /Civil Union     Spouse name: Not on file   â¢ Number of children: Not on file   â¢ Years of education: Not on file   â¢ Highest education level: Not on file   Occupational History   â¢ Not on file   Tobacco Use   â¢ Smoking status: Never Smoker   â¢ Smokeless tobacco: Never Used   Substance and Sexual Activity   â¢ Alcohol use: No   â¢ Drug use: No   â¢ Sexual activity: "Not on file   Other Topics Concern   â¢ Not on file   Social History Narrative   â¢ Not on file     Social Determinants of Health     Financial Resource Strain: Not on file   Food Insecurity: Not on file   Transportation Needs: Not on file   Physical Activity: Not on file   Stress: Not on file   Social Connections: Not on file   Intimate Partner Violence: Not on file         Immunization History   Administered Date(s) Administered   â¢ COVID-19 12Y+ Pfizer-Divitel - Requires Dilution 01/26/2021, 02/15/2021, 12/06/2021   â¢ Influenza, injectable, quadrivalent, preservative-free 12/10/2018, 02/01/2020, 10/20/2020, 12/09/2021   â¢ PPD 03/29/2017, 03/04/2020   â¢ Pneumococcal Polysaccharide Vacc (Pneumovax 23) 12/10/2018        Review of Systems   Constitutional: Negative. HENT: Negative. Respiratory: Negative. Cardiovascular: Negative. Gastrointestinal: Negative. Genitourinary: Negative. Musculoskeletal:        Per HPI   Skin: Negative. Psychiatric/Behavioral: Negative. Physical Exam:  Visit Vitals  /76 (BP Location: LUE - Left upper extremity, Patient Position: Sitting, Cuff Size: Large Adult)   Pulse 75   Temp 97.8 Â°F (36.6 Â°C) (Tympanic)   Resp 16   Ht 5' 7"" (1.702 m)   Wt 118.1 kg (260 lb 6.4 oz)   SpO2 99%   BMI 40.78 kg/mÂ²       Physical Exam  Vitals reviewed. Constitutional:       Appearance: He is well-developed. He is obese. HENT:      Head: Normocephalic and atraumatic. Cardiovascular:      Rate and Rhythm: Normal rate and regular rhythm. Heart sounds: Normal heart sounds. Pulmonary:      Effort: Pulmonary effort is normal.      Breath sounds: Normal breath sounds. Abdominal:      General: Bowel sounds are normal.      Palpations: Abdomen is soft. Musculoskeletal:         General: Normal range of motion. Comments: No feet or upper thigh swelling noted. Some mild swelling in left knee. Skin:     General: Skin is warm and dry.    Neurological:      Mental " Status: He is alert. Psychiatric:         Mood and Affect: Mood normal.         Behavior: Behavior normal.         Last Results:    No visits with results within 1 Month(s) from this visit.    Latest known visit with results is:   Lab Services on 04/22/2022   Component Date Value Ref Range Status   â¢ Uric Acid 04/22/2022 6.0  3.5 - 7.2 mg/dL Final   â¢ Microalbumin, Urine 05/04/2022 0.55  mg/dL Final   â¢ Creatinine, Urine 05/04/2022 187.00  mg/dL Final   â¢ Microalbumin/ Creatinine Ratio 05/04/2022 2.9  <30.0 mg/g Final   â¢ Cholesterol 04/22/2022 163  <=199 mg/dL Final   â¢ Triglycerides 04/22/2022 115  <=149 mg/dL Final   â¢ HDL 04/22/2022 39 (A) >=40 mg/dL Final   â¢ LDL 04/22/2022 101  <=129 mg/dL Final   â¢ Non-HDL Cholesterol 04/22/2022 124  mg/dL Final   â¢ Cholesterol/ HDL Ratio 04/22/2022 4.2  <=4.4 Final   â¢ Hemoglobin A1C 04/22/2022 7.2 (A) 4.5 - 5.6 % Final   â¢ Sodium 04/22/2022 142  135 - 145 mmol/L Final   â¢ Potassium 04/22/2022 4.1  3.4 - 5.1 mmol/L Final   â¢ Chloride 04/22/2022 108 (A) 98 - 107 mmol/L Final   â¢ Carbon Dioxide 04/22/2022 27  21 - 32 mmol/L Final   â¢ Anion Gap 04/22/2022 11  10 - 20 mmol/L Final   â¢ Glucose 04/22/2022 115 (A) 70 - 99 mg/dL Final   â¢ BUN 04/22/2022 9  6 - 20 mg/dL Final   â¢ Creatinine 04/22/2022 1.11  0.67 - 1.17 mg/dL Final   â¢ Glomerular Filtration Rate 04/22/2022 80  >=60 Final   â¢ BUN/ Creatinine Ratio 04/22/2022 8  7 - 25 Final   â¢ Calcium 04/22/2022 9.7  8.4 - 10.2 mg/dL Final   â¢ Bilirubin, Total 04/22/2022 0.7  0.2 - 1.0 mg/dL Final   â¢ GOT/AST 04/22/2022 35  <=37 Units/L Final   â¢ GPT/ALT 04/22/2022 52  <64 Units/L Final   â¢ Alkaline Phosphatase 04/22/2022 61  45 - 117 Units/L Final   â¢ Albumin 04/22/2022 4.1  3.6 - 5.1 g/dL Final   â¢ Protein, Total 04/22/2022 7.6  6.4 - 8.2 g/dL Final   â¢ Globulin 04/22/2022 3.5  2.0 - 4.0 g/dL Final   â¢ A/G Ratio 04/22/2022 1.2  1.0 - 2.4 Final   â¢ Prostate Specific Antigen 04/22/2022 0.40  <=3.50 ng/mL Final   â¢ WBC 04/22/2022 5.2 4.2 - 11.0 K/mcL Final   â¢ RBC 04/22/2022 4.22 (A) 4.50 - 5.90 mil/mcL Final   â¢ HGB 04/22/2022 14.0  13.0 - 17.0 g/dL Final   â¢ HCT 04/22/2022 41.3  39.0 - 51.0 % Final   â¢ MCV 04/22/2022 97.9  78.0 - 100.0 fl Final   â¢ MCH 04/22/2022 33.2  26.0 - 34.0 pg Final   â¢ MCHC 04/22/2022 33.9  32.0 - 36.5 g/dL Final   â¢ RDW-CV 04/22/2022 13.6  11.0 - 15.0 % Final   â¢ RDW-SD 04/22/2022 49.0  39.0 - 50.0 fL Final   â¢ PLT 04/22/2022 207  140 - 450 K/mcL Final   â¢ NRBC 04/22/2022 0  <=0 /100 WBC Final   â¢ Neutrophil, Percent 04/22/2022 54  % Final   â¢ Lymphocytes, Percent 04/22/2022 34  % Final   â¢ Mono, Percent 04/22/2022 8  % Final   â¢ Eosinophils, Percent 04/22/2022 3  % Final   â¢ Basophils, Percent 04/22/2022 1  % Final   â¢ Immature Granulocytes 04/22/2022 0  % Final   â¢ Absolute Neutrophils 04/22/2022 2.8  1.8 - 7.7 K/mcL Final   â¢ Absolute Lymphocytes 04/22/2022 1.8  1.0 - 4.0 K/mcL Final   â¢ Absolute Monocytes 04/22/2022 0.4  0.3 - 0.9 K/mcL Final   â¢ Absolute Eosinophils  04/22/2022 0.2  0.0 - 0.5 K/mcL Final   â¢ Absolute Basophils 04/22/2022 0.1  0.0 - 0.3 K/mcL Final   â¢ Absolute Immmature Granulocytes 04/22/2022 0.0  0.0 - 0.2 K/mcL Final         No results found for this or any previous visit (from the past 2 hour(s)). Assessment/Plan:  Problem List Items Addressed This Visit        Cardiac and Vasculature    Mixed hyperlipidemia     Monitor: The problem is stable. Evaluation: No labs/tests required today. Assessment/Treatment:  Continue current treatment/monitoring regimen. Endocrine and Metabolic    Type 2 diabetes mellitus without complication, without long-term current use of insulin (CMS/[facility]) - Primary     Monitor: The problem is stable. Evaluation: Labs/tests ordered, see encounter summary. Assessment/Treatment: We will make recommendations about metformin or other diabetes treatment after A1c completed and Continue current treatment/monitoring regimen.            Relevant Orders GLYCOHEMOGLOBIN       Musculoskeletal and Injuries    Gout     Monitor: The problem is stable. Evaluation: No labs/tests required today. Assessment/Treatment:  Continue current treatment/monitoring regimen. Left knee pain     Unchanged per patient  X-rays reviewed  Encouraged to make appointment see orthopedics  Follow-up with his job as per their protocol               Return to clinic in 3 months          Please note that this documentation was completed using [employer] dictation.   Please excuse any typos or incorrect grammar    Davida Duong MD during hospitalization

## 2023-07-29 NOTE — OCCUPATIONAL THERAPY INITIAL EVALUATION ADULT - PLANNED THERAPY INTERVENTIONS, OT EVAL
ADL retraining/bed mobility training/fine motor coordination training/strengthening/transfer training

## 2023-07-29 NOTE — PROGRESS NOTE ADULT - SUBJECTIVE AND OBJECTIVE BOX
SUBJECTIVE:    Patient is a 88y old Female who presents with a chief complaint of r/o CVA (28 Jul 2023 19:07)    Currently admitted to medicine with the primary diagnosis of Stroke       Today is hospital day 1d. This morning she is resting comfortably in bed and reports no new issues or overnight events.     ROS:   CONSTITUTIONAL: No weakness, fevers or chills   EYES/ENT: No visual changes; No vertigo or throat pain   NECK: No pain or stiffness   RESPIRATORY: No cough, wheezing, hemoptysis; No shortness of breath   CARDIOVASCULAR: No chest pain or palpitations   GASTROINTESTINAL: No abdominal or epigastric pain. No nausea, vomiting, or hematemesis; No diarrhea or constipation. No melena or hematochezia.  GENITOURINARY: No dysuria, frequency or hematuria  NEUROLOGICAL: No numbness or weakness  SKIN: No itching, rashes      PAST MEDICAL & SURGICAL HISTORY  DM (diabetes mellitus)    HTN (hypertension)    Vertigo    CVA (cerebral vascular accident)    Breast cancer    H/O mastectomy  right      SOCIAL HISTORY:    ALLERGIES:  codeine (Hives; Urticaria)    MEDICATIONS:  STANDING MEDICATIONS  aspirin  chewable 81 milliGRAM(s) Oral daily  atorvastatin 80 milliGRAM(s) Oral at bedtime  clopidogrel Tablet 75 milliGRAM(s) Oral daily  dextrose 5%. 1000 milliLiter(s) IV Continuous <Continuous>  dextrose 5%. 1000 milliLiter(s) IV Continuous <Continuous>  dextrose 50% Injectable 25 Gram(s) IV Push once  dextrose 50% Injectable 25 Gram(s) IV Push once  dextrose 50% Injectable 12.5 Gram(s) IV Push once  glucagon  Injectable 1 milliGRAM(s) IntraMuscular once  heparin   Injectable 5000 Unit(s) SubCutaneous every 12 hours  insulin lispro (ADMELOG) corrective regimen sliding scale   SubCutaneous three times a day before meals  insulin lispro (ADMELOG) corrective regimen sliding scale   SubCutaneous at bedtime  pantoprazole    Tablet 40 milliGRAM(s) Oral before breakfast  sodium chloride 0.9%. 1000 milliLiter(s) IV Continuous <Continuous>    PRN MEDICATIONS  dextrose Oral Gel 15 Gram(s) Oral once PRN  hydrALAZINE Injectable 5 milliGRAM(s) IV Push every 6 hours PRN    VITALS:   T(F): 96.9  HR: 65  BP: 149/89  RR: 20  SpO2: 95%    LABS:  Negative for smoking/alcohol/drug use.                         11.8   5.36  )-----------( 149      ( 29 Jul 2023 06:04 )             36.7     07-29    140  |  107  |  12  ----------------------------<  117<H>  4.9   |  24  |  0.8    Ca    9.5      29 Jul 2023 06:04  Mg     1.8     07-29    TPro  6.0  /  Alb  4.0  /  TBili  0.3  /  DBili  x   /  AST  21  /  ALT  14  /  AlkPhos  59  07-29    PT/INR - ( 28 Jul 2023 13:05 )   PT: 11.40 sec;   INR: 1.00 ratio         PTT - ( 28 Jul 2023 13:05 )  PTT:30.7 sec  Urinalysis Basic - ( 29 Jul 2023 06:04 )    Color: x / Appearance: x / SG: x / pH: x  Gluc: 117 mg/dL / Ketone: x  / Bili: x / Urobili: x   Blood: x / Protein: x / Nitrite: x   Leuk Esterase: x / RBC: x / WBC x   Sq Epi: x / Non Sq Epi: x / Bacteria: x        Troponin T, Serum: <0.01 ng/mL (07-28-23 @ 13:05)      CARDIAC MARKERS ( 28 Jul 2023 13:05 )  x     / <0.01 ng/mL / x     / x     / x          RADIOLOGY:    PHYSICAL EXAM:  GEN: No acute distress  HEENT: normocephalic, atraumatic, aniceteric  LUNGS: Clear to auscultation bilaterally, no rales/wheezing/ rhonchi  HEART: S1/S2 present. RRR, no murmurs  ABD: Soft, non-tender, non-distended. Bowel sounds present  EXT: warm   NEURO: AAOX3, normal affect      ASSESSMENT AND PLAN:    89 yo female who recently was discharged in june 2023 with a similar presentation, presented with dizziness "room spinning" at 6 am this morning.  She reports worsening left lower extremity weakness, who unlike previous episode in june, pt denies any aphasia this episode.       #Dizziness , r/o CVA  - admit to stepdown.   - Pt with dizziness and unstable  - Pt had similar episode last month was admitted, suspected secondary to TIA> pt was sent home [facility] and Neurology follow up  CT Angio Neck Stroke Protocol w/ IV Cont  1.  No evidence of acute large vessel occlusion. Normal perfusion images.  2.  Stable exam since 6/14/23 with calcific plaque at the ICA origins   producing mild (< 50%) stenosis.  CT Brain Stroke Protocol   1.  No evidence of acute intracranial hemorrhage or large territory   infarct. Stable exam since 6/14/2023.  2.  Stable chronic microvascular changes and multiple chronic infarcts.  - Check Lipid profile, TSH, A1C  - c/w Lipitor, ASA and Plavix for now  - Neurology consult, neuro checks  Q4H  - PT/OT , s/s   - fall precautions  - TTE 6/2023: EF 72%, Mild MR, Mild TR  - not a candidate for TNK out of window   - last month ECHO done without bubble study, obtain ECHO with bubble study , obtain MRI of brain.   - permission HTN for 1 day.  NS IVF x 1 day      # DLD, CAD s/p stent - c/w asa, plavix, statin     #H/O HTN  - hold quinapril   - permissive HTN x 1 day    #DMT2   - Home: Glimepiride   - Check A1C  - SSI ac hs.   - Avoid Hypoglycemia  - Target FS (140-180)    #Hx/o breast Ca s/p mastectomy   - in remission.     # dvt/gi ppx/diet  # dispo: acute - STR vs home PT   # handoff: pnding TTE w/ bubble/ MRI brain  SUBJECTIVE:    Patient is a 88y old Female who presents with a chief complaint of r/o CVA (28 Jul 2023 19:07)    Currently admitted to medicine with the primary diagnosis of Stroke       Today is hospital day 1d. This morning she is resting comfortably in bed and reports no new issues or overnight events.     ROS:   CONSTITUTIONAL: No weakness, fevers or chills   EYES/ENT: No visual changes; No vertigo or throat pain   NECK: No pain or stiffness   RESPIRATORY: No cough, wheezing, hemoptysis; No shortness of breath   CARDIOVASCULAR: No chest pain or palpitations   GASTROINTESTINAL: No abdominal or epigastric pain. No nausea, vomiting, or hematemesis; No diarrhea or constipation. No melena or hematochezia.  GENITOURINARY: No dysuria, frequency or hematuria  NEUROLOGICAL: No numbness or weakness  SKIN: No itching, rashes      PAST MEDICAL & SURGICAL HISTORY  DM (diabetes mellitus)    HTN (hypertension)    Vertigo    CVA (cerebral vascular accident)    Breast cancer    H/O mastectomy  right      SOCIAL HISTORY:    ALLERGIES:  codeine (Hives; Urticaria)    MEDICATIONS:  STANDING MEDICATIONS  aspirin  chewable 81 milliGRAM(s) Oral daily  atorvastatin 80 milliGRAM(s) Oral at bedtime  clopidogrel Tablet 75 milliGRAM(s) Oral daily  dextrose 5%. 1000 milliLiter(s) IV Continuous <Continuous>  dextrose 5%. 1000 milliLiter(s) IV Continuous <Continuous>  dextrose 50% Injectable 25 Gram(s) IV Push once  dextrose 50% Injectable 25 Gram(s) IV Push once  dextrose 50% Injectable 12.5 Gram(s) IV Push once  glucagon  Injectable 1 milliGRAM(s) IntraMuscular once  heparin   Injectable 5000 Unit(s) SubCutaneous every 12 hours  insulin lispro (ADMELOG) corrective regimen sliding scale   SubCutaneous three times a day before meals  insulin lispro (ADMELOG) corrective regimen sliding scale   SubCutaneous at bedtime  pantoprazole    Tablet 40 milliGRAM(s) Oral before breakfast  sodium chloride 0.9%. 1000 milliLiter(s) IV Continuous <Continuous>    PRN MEDICATIONS  dextrose Oral Gel 15 Gram(s) Oral once PRN  hydrALAZINE Injectable 5 milliGRAM(s) IV Push every 6 hours PRN    VITALS:   T(F): 96.9  HR: 65  BP: 149/89  RR: 20  SpO2: 95%    LABS:  Negative for smoking/alcohol/drug use.                         11.8   5.36  )-----------( 149      ( 29 Jul 2023 06:04 )             36.7     07-29    140  |  107  |  12  ----------------------------<  117<H>  4.9   |  24  |  0.8    Ca    9.5      29 Jul 2023 06:04  Mg     1.8     07-29    TPro  6.0  /  Alb  4.0  /  TBili  0.3  /  DBili  x   /  AST  21  /  ALT  14  /  AlkPhos  59  07-29    PT/INR - ( 28 Jul 2023 13:05 )   PT: 11.40 sec;   INR: 1.00 ratio         PTT - ( 28 Jul 2023 13:05 )  PTT:30.7 sec  Urinalysis Basic - ( 29 Jul 2023 06:04 )    Color: x / Appearance: x / SG: x / pH: x  Gluc: 117 mg/dL / Ketone: x  / Bili: x / Urobili: x   Blood: x / Protein: x / Nitrite: x   Leuk Esterase: x / RBC: x / WBC x   Sq Epi: x / Non Sq Epi: x / Bacteria: x        Troponin T, Serum: <0.01 ng/mL (07-28-23 @ 13:05)      CARDIAC MARKERS ( 28 Jul 2023 13:05 )  x     / <0.01 ng/mL / x     / x     / x          RADIOLOGY:    PHYSICAL EXAM:  GEN: No acute distress  HEENT: normocephalic, atraumatic, aniceteric  LUNGS: Clear to auscultation bilaterally, no rales/wheezing/ rhonchi  HEART: S1/S2 present. RRR, no murmurs  ABD: Soft, non-tender, non-distended. Bowel sounds present  EXT: warm   NEURO: AAOX3, normal affect      ASSESSMENT AND PLAN:    87 yo female who recently was discharged in june 2023 with a similar presentation, presented with dizziness "room spinning" at 6 am this morning.  She reports worsening left lower extremity weakness, who unlike previous episode in june, pt denies any aphasia this episode.       #Dizziness / LLE weakness , r/o CVA  - admit to stepdown.   - Pt with dizziness and unstable  - Pt had similar episode last month was admitted, suspected secondary to TIA> pt was sent home [facility] and Neurology follow up  CT Angio Neck Stroke Protocol w/ IV Cont  1.  No evidence of acute large vessel occlusion. Normal perfusion images.  2.  Stable exam since 6/14/23 with calcific plaque at the ICA origins   producing mild (< 50%) stenosis.  CT Brain Stroke Protocol   1.  No evidence of acute intracranial hemorrhage or large territory   infarct. Stable exam since 6/14/2023.  2.  Stable chronic microvascular changes and multiple chronic infarcts.  - Check Lipid profile, TSH, A1C  - c/w Lipitor, ASA and Plavix for now  - Neurology consult, neuro checks  Q4H  - PT/OT , s/s   - fall precautions  - TTE 6/2023: EF 72%, Mild MR, Mild TR  - not a candidate for TNK out of window   - last month ECHO done without bubble study, obtain ECHO with bubble study , obtain MRI of brain.   - permission HTN for 1 day.  NS IVF x 1 day  - check orthostatic vitals       # DLD, CAD s/p stent - c/w asa, plavix, statin     #H/O HTN  - hold quinapril   - permissive HTN x 1 day    #DMT2   - Home: Glimepiride   - Check A1C  - SSI ac hs.   - Avoid Hypoglycemia  - Target FS (140-180)    #Hx/o breast Ca s/p mastectomy   - in remission.     # dvt/gi ppx/diet  # dispo: acute - STR vs home PT   # handoff: pnding TTE w/ bubble/ MRI brain / orthostatic vitals

## 2023-07-29 NOTE — OCCUPATIONAL THERAPY INITIAL EVALUATION ADULT - LIVES WITH, PROFILE
in a condo with her dog 0 steps to enter +walk-in-shower with grab bars +shower chair +standard toilet with grab bar/alone

## 2023-07-29 NOTE — PHYSICAL THERAPY INITIAL EVALUATION ADULT - ADDITIONAL COMMENTS
Patient lives alone in condo w/ 0 CAROLYN. Pt has walk-in shower w/ chair & grab bars in shower & toilet. Pt uses [employer] for ambulating in community; car service for IADLs. Independent w/ ADLs

## 2023-07-29 NOTE — PHYSICAL THERAPY INITIAL EVALUATION ADULT - GAIT DEVIATIONS NOTED, PT EVAL
decreased butch/increased time in double stance/decreased step length/decreased weight-shifting ability

## 2023-07-29 NOTE — PHYSICAL THERAPY INITIAL EVALUATION ADULT - GENERAL OBSERVATIONS, REHAB EVAL
13:40-14:05 Chart reviewed, Pt cleared for PT per NAINA Howell. Pt denies pain and is willing to participate with PT.  Pt encountered  in bed in NAD +heplock, +tele, +pulse ox, +prima-fit.

## 2023-07-29 NOTE — OCCUPATIONAL THERAPY INITIAL EVALUATION ADULT - GENERAL OBSERVATIONS, REHAB EVAL
13:52-14:25; chart reviewed, ok to treat by Occupational Therapist as confirmed by RN Lynda, Pt received semifowler +tele +BP cuff +pulse ox +LUE heplock +RUE precaution +primafit in NAD. Pt denied pain at rest and in agreement with OT IE.

## 2023-07-29 NOTE — OCCUPATIONAL THERAPY INITIAL EVALUATION ADULT - ADDITIONAL COMMENTS
PLOF obtained from pt. Pt reported she owns RW, shower chair, grab bars in shower and next to toilet. Pt stated that she was using a car service for IADLs. PT stated that she was receiving home PT services PTA.

## 2023-07-29 NOTE — PHYSICAL THERAPY INITIAL EVALUATION ADULT - PERTINENT HX OF CURRENT PROBLEM, REHAB EVAL
88 year old female with pmhx of HTN, DLD, CAD on plavix, prior CVA with residual milde L sided weakness on asa and plavix, presents with dizziness upon awakening this morning around 6 am, pt describes it as room spinning sensation, having difficulty ambulating without holding on and could not orient herself to pick things up. Pt was admitted to hospital 6/14 for similar episode, CTH and MRI Brain. were NG for acute findings, Pt discharged with [facility] and with instruction to f/u with Neurology team for work up of TIA. This time pt denies ha, loc, visual changes, slurred speech, paresthesias, , chest pain, sob, abd pain or nausea, vomiting, diarrhea.  Previous episode last admission pt reported aphasia, however this admission pt denies aphasia, but reports increasing weakness in LLE.  Patient denies any recent URI.

## 2023-07-29 NOTE — OCCUPATIONAL THERAPY INITIAL EVALUATION ADULT - PERTINENT HX OF CURRENT PROBLEM, REHAB EVAL
88 year old F with pmhx of HTN, DLD, CAD on plavix, prior CVA with residual mild L sided weakness on asa and plavix, presents with dizziness upon awakening this morning around 6 am, pt describes it as room spinning sensation, having difficulty ambulating without holding on and could not orient herself to pick things up. Pt was admitted to hospital 6/14 for similar episode, CTH and MRI Brain. were NG for acute findings, Pt discharged with [facility] and with instruction to f/u with Neurology team for work up of TIA. Previous episode last admission pt reported aphasia, however this admission pt denies aphasia, but reports increasing weakness in LLE.  Patient denies any recent URI.     CT brain 7/28: No evidence of acute intracranial hemorrhage or large territory infarct. Stable exam since 6/14/2023. Stable chronic microvascular changes and multiple chronic infarcts.    MRI pending

## 2023-07-30 LAB
ALBUMIN SERPL ELPH-MCNC: 4.1 G/DL — SIGNIFICANT CHANGE UP (ref 3.5–5.2)
ALP SERPL-CCNC: 64 U/L — SIGNIFICANT CHANGE UP (ref 30–115)
ALT FLD-CCNC: 13 U/L — SIGNIFICANT CHANGE UP (ref 0–41)
ANION GAP SERPL CALC-SCNC: 7 MMOL/L — SIGNIFICANT CHANGE UP (ref 7–14)
AST SERPL-CCNC: 20 U/L — SIGNIFICANT CHANGE UP (ref 0–41)
BASOPHILS # BLD AUTO: 0.04 K/UL — SIGNIFICANT CHANGE UP (ref 0–0.2)
BASOPHILS NFR BLD AUTO: 0.5 % — SIGNIFICANT CHANGE UP (ref 0–1)
BILIRUB SERPL-MCNC: 0.3 MG/DL — SIGNIFICANT CHANGE UP (ref 0.2–1.2)
BUN SERPL-MCNC: 12 MG/DL — SIGNIFICANT CHANGE UP (ref 10–20)
CALCIUM SERPL-MCNC: 9.3 MG/DL — SIGNIFICANT CHANGE UP (ref 8.4–10.5)
CHLORIDE SERPL-SCNC: 107 MMOL/L — SIGNIFICANT CHANGE UP (ref 98–110)
CO2 SERPL-SCNC: 26 MMOL/L — SIGNIFICANT CHANGE UP (ref 17–32)
CREAT SERPL-MCNC: 0.8 MG/DL — SIGNIFICANT CHANGE UP (ref 0.7–1.5)
EGFR: 71 ML/MIN/1.73M2 — SIGNIFICANT CHANGE UP
EOSINOPHIL # BLD AUTO: 0.06 K/UL — SIGNIFICANT CHANGE UP (ref 0–0.7)
EOSINOPHIL NFR BLD AUTO: 0.8 % — SIGNIFICANT CHANGE UP (ref 0–8)
GLUCOSE BLDC GLUCOMTR-MCNC: 120 MG/DL — HIGH (ref 70–99)
GLUCOSE BLDC GLUCOMTR-MCNC: 145 MG/DL — HIGH (ref 70–99)
GLUCOSE BLDC GLUCOMTR-MCNC: 166 MG/DL — HIGH (ref 70–99)
GLUCOSE BLDC GLUCOMTR-MCNC: 243 MG/DL — HIGH (ref 70–99)
GLUCOSE SERPL-MCNC: 144 MG/DL — HIGH (ref 70–99)
HCT VFR BLD CALC: 37.4 % — SIGNIFICANT CHANGE UP (ref 37–47)
HGB BLD-MCNC: 12.1 G/DL — SIGNIFICANT CHANGE UP (ref 12–16)
IMM GRANULOCYTES NFR BLD AUTO: 0.3 % — SIGNIFICANT CHANGE UP (ref 0.1–0.3)
LYMPHOCYTES # BLD AUTO: 1.69 K/UL — SIGNIFICANT CHANGE UP (ref 1.2–3.4)
LYMPHOCYTES # BLD AUTO: 22.1 % — SIGNIFICANT CHANGE UP (ref 20.5–51.1)
MAGNESIUM SERPL-MCNC: 1.8 MG/DL — SIGNIFICANT CHANGE UP (ref 1.8–2.4)
MCHC RBC-ENTMCNC: 27.6 PG — SIGNIFICANT CHANGE UP (ref 27–31)
MCHC RBC-ENTMCNC: 32.4 G/DL — SIGNIFICANT CHANGE UP (ref 32–37)
MCV RBC AUTO: 85.2 FL — SIGNIFICANT CHANGE UP (ref 81–99)
MONOCYTES # BLD AUTO: 0.46 K/UL — SIGNIFICANT CHANGE UP (ref 0.1–0.6)
MONOCYTES NFR BLD AUTO: 6 % — SIGNIFICANT CHANGE UP (ref 1.7–9.3)
NEUTROPHILS # BLD AUTO: 5.38 K/UL — SIGNIFICANT CHANGE UP (ref 1.4–6.5)
NEUTROPHILS NFR BLD AUTO: 70.3 % — SIGNIFICANT CHANGE UP (ref 42.2–75.2)
NRBC # BLD: 0 /100 WBCS — SIGNIFICANT CHANGE UP (ref 0–0)
PLATELET # BLD AUTO: 145 K/UL — SIGNIFICANT CHANGE UP (ref 130–400)
PMV BLD: 8.6 FL — SIGNIFICANT CHANGE UP (ref 7.4–10.4)
POTASSIUM SERPL-MCNC: 4.1 MMOL/L — SIGNIFICANT CHANGE UP (ref 3.5–5)
POTASSIUM SERPL-SCNC: 4.1 MMOL/L — SIGNIFICANT CHANGE UP (ref 3.5–5)
PROT SERPL-MCNC: 6.1 G/DL — SIGNIFICANT CHANGE UP (ref 6–8)
RBC # BLD: 4.39 M/UL — SIGNIFICANT CHANGE UP (ref 4.2–5.4)
RBC # FLD: 13.3 % — SIGNIFICANT CHANGE UP (ref 11.5–14.5)
SODIUM SERPL-SCNC: 140 MMOL/L — SIGNIFICANT CHANGE UP (ref 135–146)
WBC # BLD: 7.65 K/UL — SIGNIFICANT CHANGE UP (ref 4.8–10.8)
WBC # FLD AUTO: 7.65 K/UL — SIGNIFICANT CHANGE UP (ref 4.8–10.8)

## 2023-07-30 PROCEDURE — 99233 SBSQ HOSP IP/OBS HIGH 50: CPT

## 2023-07-30 PROCEDURE — 99222 1ST HOSP IP/OBS MODERATE 55: CPT

## 2023-07-30 RX ORDER — LISINOPRIL 2.5 MG/1
5 TABLET ORAL DAILY
Refills: 0 | Status: DISCONTINUED | OUTPATIENT
Start: 2023-07-30 | End: 2023-08-01

## 2023-07-30 RX ADMIN — ATORVASTATIN CALCIUM 80 MILLIGRAM(S): 80 TABLET, FILM COATED ORAL at 21:34

## 2023-07-30 RX ADMIN — LISINOPRIL 5 MILLIGRAM(S): 2.5 TABLET ORAL at 00:19

## 2023-07-30 RX ADMIN — HEPARIN SODIUM 5000 UNIT(S): 5000 INJECTION INTRAVENOUS; SUBCUTANEOUS at 06:05

## 2023-07-30 RX ADMIN — Medication 81 MILLIGRAM(S): at 12:03

## 2023-07-30 RX ADMIN — Medication 2: at 12:04

## 2023-07-30 RX ADMIN — HEPARIN SODIUM 5000 UNIT(S): 5000 INJECTION INTRAVENOUS; SUBCUTANEOUS at 18:29

## 2023-07-30 RX ADMIN — PANTOPRAZOLE SODIUM 40 MILLIGRAM(S): 20 TABLET, DELAYED RELEASE ORAL at 06:05

## 2023-07-30 RX ADMIN — CLOPIDOGREL BISULFATE 75 MILLIGRAM(S): 75 TABLET, FILM COATED ORAL at 12:03

## 2023-07-30 RX ADMIN — POLYETHYLENE GLYCOL 3350 17 GRAM(S): 17 POWDER, FOR SOLUTION ORAL at 12:04

## 2023-07-30 NOTE — PROGRESS NOTE ADULT - SUBJECTIVE AND OBJECTIVE BOX
SUBJECTIVE:    Patient is a 88y old Female who presents with a chief complaint of r/o CVA (30 Jul 2023 10:15)    Currently admitted to medicine with the primary diagnosis of Stroke       Today is hospital day 2d. This morning she is resting comfortably in bed    ROS:   CONSTITUTIONAL: No weakness, fevers or chills   EYES/ENT: No visual changes; No vertigo or throat pain   NECK: No pain or stiffness   RESPIRATORY: No cough, wheezing, hemoptysis; No shortness of breath   CARDIOVASCULAR: No chest pain or palpitations   GASTROINTESTINAL: No abdominal or epigastric pain. No nausea, vomiting, or hematemesis; No diarrhea or constipation. No melena or hematochezia.  GENITOURINARY: No dysuria, frequency or hematuria  NEUROLOGICAL: No numbness or weakness        PAST MEDICAL & SURGICAL HISTORY  DM (diabetes mellitus)    HTN (hypertension)    Vertigo    CVA (cerebral vascular accident)    Breast cancer    H/O mastectomy  right      SOCIAL HISTORY:    ALLERGIES:  codeine (Hives; Urticaria)    MEDICATIONS:  STANDING MEDICATIONS  aspirin  chewable 81 milliGRAM(s) Oral daily  atorvastatin 80 milliGRAM(s) Oral at bedtime  clopidogrel Tablet 75 milliGRAM(s) Oral daily  dextrose 5%. 1000 milliLiter(s) IV Continuous <Continuous>  dextrose 5%. 1000 milliLiter(s) IV Continuous <Continuous>  dextrose 50% Injectable 25 Gram(s) IV Push once  dextrose 50% Injectable 25 Gram(s) IV Push once  dextrose 50% Injectable 12.5 Gram(s) IV Push once  glucagon  Injectable 1 milliGRAM(s) IntraMuscular once  heparin   Injectable 5000 Unit(s) SubCutaneous every 12 hours  insulin lispro (ADMELOG) corrective regimen sliding scale   SubCutaneous three times a day before meals  insulin lispro (ADMELOG) corrective regimen sliding scale   SubCutaneous at bedtime  lisinopril 5 milliGRAM(s) Oral daily  pantoprazole    Tablet 40 milliGRAM(s) Oral before breakfast  polyethylene glycol 3350 17 Gram(s) Oral daily  senna 2 Tablet(s) Oral at bedtime    PRN MEDICATIONS  dextrose Oral Gel 15 Gram(s) Oral once PRN    VITALS:   T(F): 96  HR: 64  BP: 125/56  RR: 20  SpO2: 97%    LABS:  Negative for smoking/alcohol/drug use.                         12.1   7.65  )-----------( 145      ( 30 Jul 2023 06:32 )             37.4     07-30    140  |  107  |  12  ----------------------------<  144<H>  4.1   |  26  |  0.8    Ca    9.3      30 Jul 2023 06:32  Mg     1.8     07-30    TPro  6.1  /  Alb  4.1  /  TBili  0.3  /  DBili  x   /  AST  20  /  ALT  13  /  AlkPhos  64  07-30    PT/INR - ( 28 Jul 2023 13:05 )   PT: 11.40 sec;   INR: 1.00 ratio         PTT - ( 28 Jul 2023 13:05 )  PTT:30.7 sec  Urinalysis Basic - ( 30 Jul 2023 06:32 )    Color: x / Appearance: x / SG: x / pH: x  Gluc: 144 mg/dL / Ketone: x  / Bili: x / Urobili: x   Blood: x / Protein: x / Nitrite: x   Leuk Esterase: x / RBC: x / WBC x   Sq Epi: x / Non Sq Epi: x / Bacteria: x      CARDIAC MARKERS ( 28 Jul 2023 13:05 )  x     / <0.01 ng/mL / x     / x     / x        RADIOLOGY:    PHYSICAL EXAM:  GEN: No acute distress  HEENT: normocephalic, atraumatic, aniceteric  LUNGS: bl breath sounds   HEART: S1/S2 present. RRR, no murmurs  ABD: Soft, non-tender, non-distended. Bowel sounds present  EXT: warm  NEURO: AAOX3, normal affect      ASSESSMENT AND PLAN:    89 yo female who recently was discharged in june 2023 with a similar presentation, presented with dizziness "room spinning" at 6 am this morning.  She reports worsening left lower extremity weakness, who unlike previous episode in june, pt denies any aphasia this episode.       #Dizziness / LLE weakness   # H/O Chronic CVA  # H/O Moderate ventriculomegaly   - admit to stepdown.   - Pt with dizziness and unstable  - Pt had similar episode last month was admitted, suspected secondary to TIA> pt was sent home [facility] and Neurology follow up  CT Angio Neck Stroke Protocol w/ IV Cont  1.  No evidence of acute large vessel occlusion. Normal perfusion images.  2.  Stable exam since 6/14/23 with calcific plaque at the ICA origins   producing mild (< 50%) stenosis.  CT Brain Stroke Protocol   1.  No evidence of acute intracranial hemorrhage or large territory   infarct. Stable exam since 6/14/2023.  2.  Stable chronic microvascular changes and multiple chronic infarcts.  - Check Lipid profile, TSH, A1C 8  - c/w Lipitor, ASA and Plavix for now  - neurochecks per neurology   - PT/OT , s/s   - fall precautions  - TTE 6/2023: EF 72%, Mild MR, Mild TR  - not a candidate for TNK out of window   - last month ECHO done without bubble study,- fu  ECHO with bubble study   - MRI brain- Stable moderate chronic microvascular ischemic changes and scattered   chronic infarcts.Stable moderate ventriculomegaly - neuro fu   - check orthostatic vitals       # DLD, CAD s/p stent - c/w asa, plavix, statin     #H/O HTN  - hold quinapril   - permissive HTN x 1 day    #DMT2   - Home: Glimepiride   - A1C 8     - SSI ac hs.   - Avoid Hypoglycemia  - Target FS (140-180)    #Hx/o breast Ca s/p mastectomy   - in remission.     # dvt/gi ppx/diet  # dispo: acute - STR vs home PT   # handoff: fu TTE with bubble/ fu neuro for moderate ventriculomegaly / orthotatic vitals  SUBJECTIVE:    Patient is a 88y old Female who presents with a chief complaint of r/o CVA (30 Jul 2023 10:15)    Currently admitted to medicine with the primary diagnosis of Stroke       Today is hospital day 2d. This morning she is resting comfortably in bed    ROS:   CONSTITUTIONAL: No weakness, fevers or chills   EYES/ENT: No visual changes; No vertigo or throat pain   NECK: No pain or stiffness   RESPIRATORY: No cough, wheezing, hemoptysis; No shortness of breath   CARDIOVASCULAR: No chest pain or palpitations   GASTROINTESTINAL: No abdominal or epigastric pain. No nausea, vomiting, or hematemesis; No diarrhea or constipation. No melena or hematochezia.  GENITOURINARY: No dysuria, frequency or hematuria  NEUROLOGICAL: No numbness or weakness        PAST MEDICAL & SURGICAL HISTORY  DM (diabetes mellitus)    HTN (hypertension)    Vertigo    CVA (cerebral vascular accident)    Breast cancer    H/O mastectomy  right      SOCIAL HISTORY:    ALLERGIES:  codeine (Hives; Urticaria)    MEDICATIONS:  STANDING MEDICATIONS  aspirin  chewable 81 milliGRAM(s) Oral daily  atorvastatin 80 milliGRAM(s) Oral at bedtime  clopidogrel Tablet 75 milliGRAM(s) Oral daily  dextrose 5%. 1000 milliLiter(s) IV Continuous <Continuous>  dextrose 5%. 1000 milliLiter(s) IV Continuous <Continuous>  dextrose 50% Injectable 25 Gram(s) IV Push once  dextrose 50% Injectable 25 Gram(s) IV Push once  dextrose 50% Injectable 12.5 Gram(s) IV Push once  glucagon  Injectable 1 milliGRAM(s) IntraMuscular once  heparin   Injectable 5000 Unit(s) SubCutaneous every 12 hours  insulin lispro (ADMELOG) corrective regimen sliding scale   SubCutaneous three times a day before meals  insulin lispro (ADMELOG) corrective regimen sliding scale   SubCutaneous at bedtime  lisinopril 5 milliGRAM(s) Oral daily  pantoprazole    Tablet 40 milliGRAM(s) Oral before breakfast  polyethylene glycol 3350 17 Gram(s) Oral daily  senna 2 Tablet(s) Oral at bedtime    PRN MEDICATIONS  dextrose Oral Gel 15 Gram(s) Oral once PRN    VITALS:   T(F): 96  HR: 64  BP: 125/56  RR: 20  SpO2: 97%    LABS:  Negative for smoking/alcohol/drug use.                         12.1   7.65  )-----------( 145      ( 30 Jul 2023 06:32 )             37.4     07-30    140  |  107  |  12  ----------------------------<  144<H>  4.1   |  26  |  0.8    Ca    9.3      30 Jul 2023 06:32  Mg     1.8     07-30    TPro  6.1  /  Alb  4.1  /  TBili  0.3  /  DBili  x   /  AST  20  /  ALT  13  /  AlkPhos  64  07-30    PT/INR - ( 28 Jul 2023 13:05 )   PT: 11.40 sec;   INR: 1.00 ratio         PTT - ( 28 Jul 2023 13:05 )  PTT:30.7 sec  Urinalysis Basic - ( 30 Jul 2023 06:32 )    Color: x / Appearance: x / SG: x / pH: x  Gluc: 144 mg/dL / Ketone: x  / Bili: x / Urobili: x   Blood: x / Protein: x / Nitrite: x   Leuk Esterase: x / RBC: x / WBC x   Sq Epi: x / Non Sq Epi: x / Bacteria: x      CARDIAC MARKERS ( 28 Jul 2023 13:05 )  x     / <0.01 ng/mL / x     / x     / x        RADIOLOGY:    PHYSICAL EXAM:  GEN: No acute distress  HEENT: normocephalic, atraumatic, aniceteric  LUNGS: bl breath sounds   HEART: S1/S2 present. RRR, no murmurs  ABD: Soft, non-tender, non-distended. Bowel sounds present  EXT: warm  NEURO: AAOX3, normal affect      ASSESSMENT AND PLAN:    89 yo female who recently was discharged in june 2023 with a similar presentation, presented with dizziness "room spinning" at 6 am this morning.  She reports worsening left lower extremity weakness, who unlike previous episode in june, pt denies any aphasia this episode.       #Dizziness / LLE weakness   # H/O Chronic CVA  # H/O Moderate ventriculomegaly   - admit to stepdown.   - Pt with dizziness and unstable  - Pt had similar episode last month was admitted, suspected secondary to TIA> pt was sent home [facility] and Neurology follow up  CT Angio Neck Stroke Protocol w/ IV Cont  1.  No evidence of acute large vessel occlusion. Normal perfusion images.  2.  Stable exam since 6/14/23 with calcific plaque at the ICA origins   producing mild (< 50%) stenosis.  CT Brain Stroke Protocol   1.  No evidence of acute intracranial hemorrhage or large territory   infarct. Stable exam since 6/14/2023.  2.  Stable chronic microvascular changes and multiple chronic infarcts.  - Check Lipid profile, TSH, A1C 8  - c/w Lipitor, ASA and Plavix for now  - neurochecks per neurology   - PT/OT , s/s   - fall precautions  - TTE 6/2023: EF 72%, Mild MR, Mild TR  - not a candidate for TNK out of window   - last month ECHO done without bubble study,- fu  ECHO with bubble study   - MRI brain- Stable moderate chronic microvascular ischemic changes and scattered   chronic infarcts.Stable moderate ventriculomegaly - neuro fu   - check orthostatic vitals       # DLD, CAD s/p stent - c/w asa, plavix, statin     #H/O HTN  - hold quinapril   - permissive HTN x 1 day    #DMT2   - Home: Glimepiride   - A1C 8     - SSI ac hs.   - Avoid Hypoglycemia  - Target FS (140-180)    #Hx/o breast Ca s/p mastectomy   - in remission.     # dvt/gi ppx/diet  # dispo: acute - STR vs home PT    # handoff: fu TTE / fu neuro for moderate ventriculomegaly / orthostatic vitals  ; dc in 24 hrs if no further plan from neurology for ventriculomegaly

## 2023-07-30 NOTE — CONSULT NOTE ADULT - SUBJECTIVE AND OBJECTIVE BOX
Neurology Consult    Patient is a 88y old  Female who presents with a chief complaint of r/o CVA (29 Jul 2023 11:11)      HPI:  88 year old female with pmhx of HTN, DLD, CAD on plavix, prior CVA with residual milde L sided weakness on asa and plavix, presents with dizziness upon awakening this morning around 6 am, pt describes it as room spinning sensation, having difficulty ambulating without holding on and could not orient herself to pick things up. Pt was admitted to hospital 6/14 for similar episode, CTH and MRI Brain. were NG for acute findings, Pt discharged with [facility] and with instruction to f/u with Neurology team for work up of TIA. This time pt denies ha, loc, visual changes, slurred speech, paresthesias, , chest pain, sob, abd pain or nausea, vomiting, diarrhea.  Previous episode last admission pt reported aphasia, however this admission pt denies aphasia, but reports increasing weakness in LLE.  Patient denies any recent URI.     In ED,  VT bp 143/85, HR 73, RR 30, SpO2 97% RA, T 96.5 F  Labs unremarkable    CT Angio Neck Stroke Protocol w/ IV Cont  1.  No evidence of acute large vessel occlusion. Normal perfusion images.  2.  Stable exam since 6/14/23 with calcific plaque at the ICA origins   producing mild (< 50%) stenosis.    CT Brain Stroke Protocol   1.  No evidence of acute intracranial hemorrhage or large territory   infarct. Stable exam since 6/14/2023.  2.  Stable chronic microvascular changes and multiple chronic infarcts.    Case discussed with telestroke neurology.  No intervention at this time.  Recommends every 4 hour neuro checks in a monitored setting. (28 Jul 2023 15:29)      PAST MEDICAL & SURGICAL HISTORY:  DM (diabetes mellitus)      HTN (hypertension)      Vertigo      CVA (cerebral vascular accident)      Breast cancer      H/O mastectomy  right          FAMILY HISTORY:  Family history of stroke (Mother)        Social History: (-) x 3    Allergies    codeine (Hives; Urticaria)    Intolerances        MEDICATIONS  (STANDING):  aspirin  chewable 81 milliGRAM(s) Oral daily  atorvastatin 80 milliGRAM(s) Oral at bedtime  clopidogrel Tablet 75 milliGRAM(s) Oral daily  dextrose 5%. 1000 milliLiter(s) (50 mL/Hr) IV Continuous <Continuous>  dextrose 5%. 1000 milliLiter(s) (100 mL/Hr) IV Continuous <Continuous>  dextrose 50% Injectable 25 Gram(s) IV Push once  dextrose 50% Injectable 12.5 Gram(s) IV Push once  dextrose 50% Injectable 25 Gram(s) IV Push once  glucagon  Injectable 1 milliGRAM(s) IntraMuscular once  heparin   Injectable 5000 Unit(s) SubCutaneous every 12 hours  insulin lispro (ADMELOG) corrective regimen sliding scale   SubCutaneous three times a day before meals  insulin lispro (ADMELOG) corrective regimen sliding scale   SubCutaneous at bedtime  lisinopril 5 milliGRAM(s) Oral daily  pantoprazole    Tablet 40 milliGRAM(s) Oral before breakfast  polyethylene glycol 3350 17 Gram(s) Oral daily  senna 2 Tablet(s) Oral at bedtime    MEDICATIONS  (PRN):  dextrose Oral Gel 15 Gram(s) Oral once PRN Blood Glucose LESS THAN 70 milliGRAM(s)/deciliter      Review of systems:    Constitutional: as per HPI  Eyes: No eye pain or discharge  ENMT:  No difficulty hearing; No sinus or throat pain  Neck: No pain or stiffness  Respiratory: No cough, wheezing, chills or hemoptysis  Cardiovascular: No chest pain, palpitations, shortness of breath, dyspnea on exertion  Gastrointestinal: No abdominal pain, nausea, vomiting or hematemesis; No diarrhea or constipation.   Genitourinary: No dysuria, frequency, hematuria or incontinence  Neurological: As per HPI  Skin: No rashes or lesions   Endocrine: No heat or cold intolerance; No hair loss  Musculoskeletal: No joint pain or swelling  Psychiatric: No depression, anxiety, mood swings  Heme/Lymph: No easy bruising or bleeding gums    Vital Signs Last 24 Hrs  T(C): 35.6 (30 Jul 2023 07:00), Max: 36.7 (29 Jul 2023 19:43)  T(F): 96 (30 Jul 2023 07:00), Max: 98.1 (29 Jul 2023 19:43)  HR: 64 (30 Jul 2023 07:59) (55 - 64)  BP: 125/56 (30 Jul 2023 07:59) (125/56 - 173/77)  BP(mean): 80 (30 Jul 2023 07:59) (80 - 110)  RR: 20 (30 Jul 2023 07:59) (17 - 34)  SpO2: 97% (30 Jul 2023 07:59) (91% - 98%)    Parameters below as of 30 Jul 2023 07:59  Patient On (Oxygen Delivery Method): room air        Examination:  General:  Appearance is consistent with chronologic age.  No abnormal facies.  Gross skin survey within normal limits.    Cognitive/Language:  The patient is oriented to person, place, time and date.  Recent and remote memory intact.  Fund of knowledge is intact and normal.  Language with normal repetition, comprehension and naming.  Nondysarthric.    Eyes: intact VA, VFF.  EOMI w/o nystagmus, skew or reported double vision.  PERRL.  No ptosis/weakness of eyelid closure.    Face:  Facial sensation normal V1 - 3, no facial asymmetry.    Ears/Nose/Throat:  Hearing grossly intact b/l.  Palate elevates midline.  Tongue and uvula midline.   Motor examination:   Normal tone, bulk and range of motion.  No tenderness, twitching, tremors or involuntary movements.  Formal Muscle Strength Testing: (MRC grade R/L) 5/5 UE; 5/5 LE.  No observable drift.  Reflexes:   2+ b/l pectoralis, biceps, triceps, brachioradialis, patella and Achilles.  Plantar response downgoing b/l.  Jaw jerk, Vidhya, clonus absent.  Sensory examination:   Intact to light touch and pinprick, pain, temperature and proprioception and vibration in all extremities.  Cerebellum:   FTN/HKS intact with normal KATARZYNA in all limbs.  No dysmetria or dysdiadokinesia.  Gait narrow based and normal.    Respiratory:  no audible wheezing or inspiratory stridor.  no use of accessory muscles.   Cardiac: pulse palpable, no audible bruits  Abdomen: supple, no guarding, no TTP    Labs:   CBC Full  -  ( 30 Jul 2023 06:32 )  WBC Count : 7.65 K/uL  RBC Count : 4.39 M/uL  Hemoglobin : 12.1 g/dL  Hematocrit : 37.4 %  Platelet Count - Automated : 145 K/uL  Mean Cell Volume : 85.2 fL  Mean Cell Hemoglobin : 27.6 pg  Mean Cell Hemoglobin Concentration : 32.4 g/dL  Auto Neutrophil # : 5.38 K/uL  Auto Lymphocyte # : 1.69 K/uL  Auto Monocyte # : 0.46 K/uL  Auto Eosinophil # : 0.06 K/uL  Auto Basophil # : 0.04 K/uL  Auto Neutrophil % : 70.3 %  Auto Lymphocyte % : 22.1 %  Auto Monocyte % : 6.0 %  Auto Eosinophil % : 0.8 %  Auto Basophil % : 0.5 %    07-30    140  |  107  |  12  ----------------------------<  144<H>  4.1   |  26  |  0.8    Ca    9.3      30 Jul 2023 06:32  Mg     1.8     07-30    TPro  6.1  /  Alb  4.1  /  TBili  0.3  /  DBili  x   /  AST  20  /  ALT  13  /  AlkPhos  64  07-30    LIVER FUNCTIONS - ( 30 Jul 2023 06:32 )  Alb: 4.1 g/dL / Pro: 6.1 g/dL / ALK PHOS: 64 U/L / ALT: 13 U/L / AST: 20 U/L / GGT: x           PT/INR - ( 28 Jul 2023 13:05 )   PT: 11.40 sec;   INR: 1.00 ratio         PTT - ( 28 Jul 2023 13:05 )  PTT:30.7 sec  Urinalysis Basic - ( 30 Jul 2023 06:32 )    Color: x / Appearance: x / SG: x / pH: x  Gluc: 144 mg/dL / Ketone: x  / Bili: x / Urobili: x   Blood: x / Protein: x / Nitrite: x   Leuk Esterase: x / RBC: x / WBC x   Sq Epi: x / Non Sq Epi: x / Bacteria: x          Neuroimaging:  [facility]:     07-30-23 @ 10:16

## 2023-07-30 NOTE — PROVIDER CONTACT NOTE (OTHER) - BACKGROUND
Patient takes lisinopril at home Patient takes blood pressure medications at home, but were discontinued

## 2023-07-31 LAB
ALBUMIN SERPL ELPH-MCNC: 3.9 G/DL — SIGNIFICANT CHANGE UP (ref 3.5–5.2)
ALP SERPL-CCNC: 66 U/L — SIGNIFICANT CHANGE UP (ref 30–115)
ALT FLD-CCNC: 12 U/L — SIGNIFICANT CHANGE UP (ref 0–41)
ANION GAP SERPL CALC-SCNC: 9 MMOL/L — SIGNIFICANT CHANGE UP (ref 7–14)
AST SERPL-CCNC: 16 U/L — SIGNIFICANT CHANGE UP (ref 0–41)
BASOPHILS # BLD AUTO: 0.02 K/UL — SIGNIFICANT CHANGE UP (ref 0–0.2)
BASOPHILS NFR BLD AUTO: 0.2 % — SIGNIFICANT CHANGE UP (ref 0–1)
BILIRUB SERPL-MCNC: 0.4 MG/DL — SIGNIFICANT CHANGE UP (ref 0.2–1.2)
BUN SERPL-MCNC: 19 MG/DL — SIGNIFICANT CHANGE UP (ref 10–20)
CALCIUM SERPL-MCNC: 9.2 MG/DL — SIGNIFICANT CHANGE UP (ref 8.4–10.5)
CHLORIDE SERPL-SCNC: 102 MMOL/L — SIGNIFICANT CHANGE UP (ref 98–110)
CO2 SERPL-SCNC: 25 MMOL/L — SIGNIFICANT CHANGE UP (ref 17–32)
CREAT SERPL-MCNC: 0.9 MG/DL — SIGNIFICANT CHANGE UP (ref 0.7–1.5)
EGFR: 61 ML/MIN/1.73M2 — SIGNIFICANT CHANGE UP
EOSINOPHIL # BLD AUTO: 0.02 K/UL — SIGNIFICANT CHANGE UP (ref 0–0.7)
EOSINOPHIL NFR BLD AUTO: 0.2 % — SIGNIFICANT CHANGE UP (ref 0–8)
GLUCOSE BLDC GLUCOMTR-MCNC: 171 MG/DL — HIGH (ref 70–99)
GLUCOSE BLDC GLUCOMTR-MCNC: 253 MG/DL — HIGH (ref 70–99)
GLUCOSE BLDC GLUCOMTR-MCNC: 260 MG/DL — HIGH (ref 70–99)
GLUCOSE BLDC GLUCOMTR-MCNC: 261 MG/DL — HIGH (ref 70–99)
GLUCOSE SERPL-MCNC: 195 MG/DL — HIGH (ref 70–99)
HCT VFR BLD CALC: 35.5 % — LOW (ref 37–47)
HGB BLD-MCNC: 11.7 G/DL — LOW (ref 12–16)
IMM GRANULOCYTES NFR BLD AUTO: 0.2 % — SIGNIFICANT CHANGE UP (ref 0.1–0.3)
LYMPHOCYTES # BLD AUTO: 1.29 K/UL — SIGNIFICANT CHANGE UP (ref 1.2–3.4)
LYMPHOCYTES # BLD AUTO: 15.6 % — LOW (ref 20.5–51.1)
MAGNESIUM SERPL-MCNC: 1.7 MG/DL — LOW (ref 1.8–2.4)
MCHC RBC-ENTMCNC: 27.8 PG — SIGNIFICANT CHANGE UP (ref 27–31)
MCHC RBC-ENTMCNC: 33 G/DL — SIGNIFICANT CHANGE UP (ref 32–37)
MCV RBC AUTO: 84.3 FL — SIGNIFICANT CHANGE UP (ref 81–99)
MONOCYTES # BLD AUTO: 0.54 K/UL — SIGNIFICANT CHANGE UP (ref 0.1–0.6)
MONOCYTES NFR BLD AUTO: 6.5 % — SIGNIFICANT CHANGE UP (ref 1.7–9.3)
NEUTROPHILS # BLD AUTO: 6.4 K/UL — SIGNIFICANT CHANGE UP (ref 1.4–6.5)
NEUTROPHILS NFR BLD AUTO: 77.3 % — HIGH (ref 42.2–75.2)
NRBC # BLD: 0 /100 WBCS — SIGNIFICANT CHANGE UP (ref 0–0)
PLATELET # BLD AUTO: 146 K/UL — SIGNIFICANT CHANGE UP (ref 130–400)
PMV BLD: 9.2 FL — SIGNIFICANT CHANGE UP (ref 7.4–10.4)
POTASSIUM SERPL-MCNC: 4.3 MMOL/L — SIGNIFICANT CHANGE UP (ref 3.5–5)
POTASSIUM SERPL-SCNC: 4.3 MMOL/L — SIGNIFICANT CHANGE UP (ref 3.5–5)
PROT SERPL-MCNC: 6.1 G/DL — SIGNIFICANT CHANGE UP (ref 6–8)
RBC # BLD: 4.21 M/UL — SIGNIFICANT CHANGE UP (ref 4.2–5.4)
RBC # FLD: 13.4 % — SIGNIFICANT CHANGE UP (ref 11.5–14.5)
SODIUM SERPL-SCNC: 136 MMOL/L — SIGNIFICANT CHANGE UP (ref 135–146)
WBC # BLD: 8.29 K/UL — SIGNIFICANT CHANGE UP (ref 4.8–10.8)
WBC # FLD AUTO: 8.29 K/UL — SIGNIFICANT CHANGE UP (ref 4.8–10.8)

## 2023-07-31 PROCEDURE — 99232 SBSQ HOSP IP/OBS MODERATE 35: CPT

## 2023-07-31 PROCEDURE — 93306 TTE W/DOPPLER COMPLETE: CPT | Mod: 26

## 2023-07-31 RX ORDER — MECLIZINE HCL 12.5 MG
25 TABLET ORAL ONCE
Refills: 0 | Status: COMPLETED | OUTPATIENT
Start: 2023-07-31 | End: 2023-07-31

## 2023-07-31 RX ORDER — MECLIZINE HCL 12.5 MG
25 TABLET ORAL EVERY 12 HOURS
Refills: 0 | Status: DISCONTINUED | OUTPATIENT
Start: 2023-07-31 | End: 2023-08-01

## 2023-07-31 RX ADMIN — Medication 1: at 07:52

## 2023-07-31 RX ADMIN — HEPARIN SODIUM 5000 UNIT(S): 5000 INJECTION INTRAVENOUS; SUBCUTANEOUS at 17:26

## 2023-07-31 RX ADMIN — LISINOPRIL 5 MILLIGRAM(S): 2.5 TABLET ORAL at 05:50

## 2023-07-31 RX ADMIN — HEPARIN SODIUM 5000 UNIT(S): 5000 INJECTION INTRAVENOUS; SUBCUTANEOUS at 05:50

## 2023-07-31 RX ADMIN — SENNA PLUS 2 TABLET(S): 8.6 TABLET ORAL at 21:11

## 2023-07-31 RX ADMIN — ATORVASTATIN CALCIUM 80 MILLIGRAM(S): 80 TABLET, FILM COATED ORAL at 21:11

## 2023-07-31 RX ADMIN — Medication 3: at 11:21

## 2023-07-31 RX ADMIN — PANTOPRAZOLE SODIUM 40 MILLIGRAM(S): 20 TABLET, DELAYED RELEASE ORAL at 05:50

## 2023-07-31 RX ADMIN — Medication 3: at 16:25

## 2023-07-31 RX ADMIN — Medication 1: at 21:26

## 2023-07-31 RX ADMIN — Medication 81 MILLIGRAM(S): at 11:20

## 2023-07-31 RX ADMIN — Medication 25 MILLIGRAM(S): at 12:24

## 2023-07-31 RX ADMIN — CLOPIDOGREL BISULFATE 75 MILLIGRAM(S): 75 TABLET, FILM COATED ORAL at 11:21

## 2023-07-31 NOTE — PROGRESS NOTE ADULT - ASSESSMENT
89 yo female who recently was discharged in june 2023 with a similar presentation, presented with dizziness "room spinning" at 6 am this morning.  She reports worsening left lower extremity weakness, who unlike previous episode in june, pt denies any aphasia this episode.       #Dizziness / LLE weakness   # H/O Chronic CVA  # H/O Moderate ventriculomegaly   - admit to stepdown.   - Pt with dizziness and unstable  - Pt had similar episode last month was admitted, suspected secondary to TIA> pt was sent home [facility] and Neurology follow up  CT Angio Neck Stroke Protocol w/ IV Cont  1.  No evidence of acute large vessel occlusion. Normal perfusion images.  2.  Stable exam since 6/14/23 with calcific plaque at the ICA origins   producing mild (< 50%) stenosis.  CT Brain Stroke Protocol   1.  No evidence of acute intracranial hemorrhage or large territory   infarct. Stable exam since 6/14/2023.  2.  Stable chronic microvascular changes and multiple chronic infarcts.  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   - Avoid Hypoglycemia  - Target FS (140-180)    #Hx/o breast Ca s/p mastectomy   - in remission.     # dvt/gi ppx/diet  # dispo: acute - STR vs home PT    # handoff: fu TTE / fu neuro for moderate ventriculomegaly / orthostatic vitals  ; dc in 24 hrs if no further plan from neurology for ventriculomegaly

## 2023-07-31 NOTE — CHART NOTE - NSCHARTNOTEFT_GEN_A_CORE
Patient is a 88y old  Female with PMH of HTN, HLD, DM, CVA with residual L sided weakness, breast CA s/p mastectomy, CAD on Plavix and ASA presented 7/28 with worsened L sided weakness and dizziness described as room spinning sensation    7/31: hopsital course: pt was stroke code, had imaging work up including CTA h/n, MR without acute CVA. ECHO with bubble study pending. 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 Ordered Meclizine. Pt states she still has some room spinning sensation on head movement but overall feels improved, L sided weakness is improved. Has no complaints at this time.       INTERVAL HPI/OVERNIGHT EVENTS:   No overnight events   Afebrile, hemodynamically stable   ____________________________________________________________________________________  ICU Vital Signs Last 24 Hrs  T(C): 36.8 (31 Jul 2023 07:01), Max: 36.8 (30 Jul 2023 23:54)  T(F): 98.3 (31 Jul 2023 07:01), Max: 98.3 (30 Jul 2023 23:54)  HR: 60 (31 Jul 2023 07:01) (60 - 68)  BP: 137/62 (31 Jul 2023 07:01) (120/69 - 170/68)  BP(mean): 89 (31 Jul 2023 07:01) (83 - 98)  RR: 20 (31 Jul 2023 07:01) (20 - 25)  SpO2: 94% (31 Jul 2023 07:01) (94% - 98%)    O2 Parameters below as of 31 Jul 2023 07:01  Patient On (Oxygen Delivery Method): room air  ____________________________________________________________________________________        I&O's Summary    30 Jul 2023 07:01  -  31 Jul 2023 07:00  --------------------------------------------------------  IN: 0 mL / OUT: 825 mL / NET: -825 mL    31 Jul 2023 07:01  -  31 Jul 2023 11:37  --------------------------------------------------------  IN: 210 mL / OUT: 0 mL / NET: 210 mL  ____________________________________________________________________________________        LABS:                        11.7   8.29  )-----------( 146      ( 31 Jul 2023 05:11 )             35.5     07-31    136  |  102  |  19  ----------------------------<  195<H>  4.3   |  25  |  0.9    Ca    9.2      31 Jul 2023 05:11  Mg     1.7     07-31    TPro  6.1  /  Alb  3.9  /  TBili  0.4  /  DBili  x   /  AST  16  /  ALT  12  /  AlkPhos  66  07-31      Urinalysis Basic - ( 31 Jul 2023 05:11 )    Color: x / Appearance: x / SG: x / pH: x  Gluc: 195 mg/dL / Ketone: x  / Bili: x / Urobili: x   Blood: x / Protein: x / Nitrite: x   Leuk Esterase: x / RBC: x / WBC x   Sq Epi: x / Non Sq Epi: x / Bacteria: x      CAPILLARY BLOOD GLUCOSE      POCT Blood Glucose.: 261 mg/dL (31 Jul 2023 11:15)  POCT Blood Glucose.: 171 mg/dL (31 Jul 2023 07:20)  POCT Blood Glucose.: 166 mg/dL (30 Jul 2023 21:07)  POCT Blood Glucose.: 120 mg/dL (30 Jul 2023 16:30)  POCT Blood Glucose.: 243 mg/dL (30 Jul 2023 11:46)  ____________________________________________________________________________________      RADIOLOGY & ADDITIONAL TESTS:    MR head 7/29/23:   No acute intracranial pathology.  Stable moderate chronic microvascular ischemic changes and scattered chronic infarcts.  Stable moderate ventriculomegaly.    CTA head/neck and CT perfusion 7/28/23  1. No evidence of acute large vessel occlusion. Normal perfusion images.  2. Stable exam since 6/14/23 with calcific plaque at the ICA origins producing mild (< 50%) stenosis.  ____________________________________________________________________________________  Consultant(s) Notes Reviewed:  [x ] YES  [ ] NO    MEDICATIONS  (STANDING):  aspirin  chewable 81 milliGRAM(s) Oral daily  atorvastatin 80 milliGRAM(s) Oral at bedtime  clopidogrel Tablet 75 milliGRAM(s) Oral daily  dextrose 5%. 1000 milliLiter(s) (50 mL/Hr) IV Continuous <Continuous>  dextrose 5%. 1000 milliLiter(s) (100 mL/Hr) IV Continuous <Continuous>  dextrose 50% Injectable 25 Gram(s) IV Push once  dextrose 50% Injectable 12.5 Gram(s) IV Push once  dextrose 50% Injectable 25 Gram(s) IV Push once  glucagon  Injectable 1 milliGRAM(s) IntraMuscular once  heparin   Injectable 5000 Unit(s) SubCutaneous every 12 hours  insulin lispro (ADMELOG) corrective regimen sliding scale   SubCutaneous three times a day before meals  insulin lispro (ADMELOG) corrective regimen sliding scale   SubCutaneous at bedtime  lisinopril 5 milliGRAM(s) Oral daily  meclizine 25 milliGRAM(s) Oral once  pantoprazole    Tablet 40 milliGRAM(s) Oral before breakfast  polyethylene glycol 3350 17 Gram(s) Oral daily  senna 2 Tablet(s) Oral at bedtime    MEDICATIONS  (PRN):  dextrose Oral Gel 15 Gram(s) Oral once PRN Blood Glucose LESS THAN 70 milliGRAM(s)/deciliter  meclizine 25 milliGRAM(s) Oral every 12 hours PRN Dizziness  ____________________________________________________________________________________    PHYSICAL EXAM:  GENERAL: well appearing elderly female in [facility], eating breakfast  SKIN: Skin exam is warm and dry, no acute rash.  HEAD: Normocephalic; atraumatic.  EYES: PERRL, EOM intact; conjunctiva and sclera clear.  ENT: mmm  CARD: S1, S2 normal; no murmurs, gallops, or rubs. Regular rate and rhythm.  RESP: Normal respiratory effort, no tachypnea or distress. Lungs CTAB, no wheezes, rales or rhonchi.  ABD: soft, NT/ND.  EXT: Normal ROM. No clubbing, cyanosis or edema.  Neuro: A&Ox3, normal speech, CN II-XII intact, FROM & strength 5/5 x4 extremities. Sensation intact and equal.  PSYCH: Cooperative, appropriate.   _______________________________________________________________________________________    Care Discussed with Consultants/Other Providers [ x] YES  [ ] NO      Assessment and Plan:   · Assessment	  87 yo female who recently was discharged in june 2023 with a similar presentation, presented with dizziness "room spinning" at 6 am this morning.  She reports worsening left lower extremity weakness, who unlike previous episode in june, pt denies any aphasia this episode.       #Dizziness / LLE weakness   # H/O Chronic CVA  # H/O Moderate ventriculomegaly   - admit to stepdown.   - Pt with dizziness and unstable  - Pt had similar episode last month was admitted, suspected secondary to TIA> pt was sent home [facility] and Neurology follow up  CT Angio Neck Stroke Protocol w/ IV Cont  1.  No evidence of acute large vessel occlusion. Normal perfusion images.  2.  Stable exam since 6/14/23 with calcific plaque at the ICA origins   producing mild (< 50%) stenosis.  CT Brain Stroke Protocol   1.  No evidence of acute intracranial hemorrhage or large territory   infarct. Stable exam since 6/14/2023.  2.  Stable chronic microvascular changes and multiple chronic infarcts.  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   - Avoid Hypoglycemia  - Target FS (140-180)    #Hx/o breast Ca s/p mastectomy   - in remission.     # dvt/gi ppx/diet  # dispo: acute - STR vs home PT    # handoff: fu TTE / fu neuro for moderate ventriculomegaly / orthostatic vitals  ; dc in 24 hrs if no further plan from neurology for ventriculomegaly
Ventriculomegaly can be followed up outpt per neuro

## 2023-07-31 NOTE — PROGRESS NOTE ADULT - ASSESSMENT
MEDICATIONS  (STANDING):  aspirin  chewable 81 milliGRAM(s) Oral daily  atorvastatin 80 milliGRAM(s) Oral at bedtime  clopidogrel Tablet 75 milliGRAM(s) Oral daily  dextrose 5%. 1000 milliLiter(s) (50 mL/Hr) IV Continuous <Continuous>  dextrose 5%. 1000 milliLiter(s) (100 mL/Hr) IV Continuous <Continuous>  dextrose 50% Injectable 25 Gram(s) IV Push once  dextrose 50% Injectable 12.5 Gram(s) IV Push once  dextrose 50% Injectable 25 Gram(s) IV Push once  glucagon  Injectable 1 milliGRAM(s) IntraMuscular once  heparin   Injectable 5000 Unit(s) SubCutaneous every 12 hours  insulin lispro (ADMELOG) corrective regimen sliding scale   SubCutaneous three times a day before meals  insulin lispro (ADMELOG) corrective regimen sliding scale   SubCutaneous at bedtime  lisinopril 5 milliGRAM(s) Oral daily  pantoprazole    Tablet 40 milliGRAM(s) Oral before breakfast  polyethylene glycol 3350 17 Gram(s) Oral daily  senna 2 Tablet(s) Oral at bedtime    MEDICATIONS  (PRN):  dextrose Oral Gel 15 Gram(s) Oral once PRN Blood Glucose LESS THAN 70 milliGRAM(s)/deciliter  meclizine 25 milliGRAM(s) Oral every 12 hours PRN Dizziness    ASSESSMENT AND PLAN:    89 yo female who recently was discharged in june 2023 with a similar presentation, presented with dizziness "room spinning" at 6 am this morning.  She reports worsening left lower extremity weakness, who unlike previous episode in june, pt denies any aphasia this episode.       Dizziness / LLE weakness resolved ruled out CVA, possibly BPPV   - H/O Moderate ventriculomegaly   - orthosatic blood pressure wnl.   - Pt had similar episode last month was admitted, suspected secondary to TIA> pt was sent home [facility] and Neurology follow up  CT Angio Neck Stroke Protocol w/ IV Cont  1.  No evidence of acute large vessel occlusion. Normal perfusion images.  2.  Stable exam since 6/14/23 with calcific plaque at the ICA origins   producing mild (< 50%) stenosis.  CT Brain Stroke Protocol   1.  No evidence of acute intracranial hemorrhage or large territory   infarct. Stable exam since 6/14/2023.  2.  Stable chronic microvascular changes and multiple chronic infarcts.  - , TSH wnl , A1C 8  - c/w Lipitor, ASA and Plavix    - neurochecks per neurology   - PT/OT , s/s   - fall precautions  - TTE 6/2023: EF 72%, Mild MR, Mild TR  - not a candidate for TNK out of window   - last month ECHO done without bubble study,- fu  ECHO with bubble study pending    - MRI brain- Stable moderate chronic microvascular ischemic changes and scattered   chronic infarcts.Stable moderate ventriculomegaly - neuro fu   - consider prn meclizine TID, appropriate for downgrade to med/surg floor.    - last discharge from hospital , had [facility] arranged.     DLP, CAD s/p stent -   -c/w asa, plavix, statin     HTN  - hold quinapril   - permissive HTN x 1 day    DMT2   - Home: Glimepiride   - A1C 8   - SSI ac hs.   - Avoid Hypoglycemia  - Target FS (140-180)    Hx/o breast Ca s/p R mastectomy   - in remission.     dvt/gi ppx/diet  dispo: STR or home PT.     barriers:  fu TTE bubble study. DC in 1-2 days, f/u neurology and cardiology (August 3rd)      MEDICATIONS  (STANDING):  aspirin  chewable 81 milliGRAM(s) Oral daily  atorvastatin 80 milliGRAM(s) Oral at bedtime  clopidogrel Tablet 75 milliGRAM(s) Oral daily  dextrose 5%. 1000 milliLiter(s) (50 mL/Hr) IV Continuous <Continuous>  dextrose 5%. 1000 milliLiter(s) (100 mL/Hr) IV Continuous <Continuous>  dextrose 50% Injectable 25 Gram(s) IV Push once  dextrose 50% Injectable 12.5 Gram(s) IV Push once  dextrose 50% Injectable 25 Gram(s) IV Push once  glucagon  Injectable 1 milliGRAM(s) IntraMuscular once  heparin   Injectable 5000 Unit(s) SubCutaneous every 12 hours  insulin lispro (ADMELOG) corrective regimen sliding scale   SubCutaneous three times a day before meals  insulin lispro (ADMELOG) corrective regimen sliding scale   SubCutaneous at bedtime  lisinopril 5 milliGRAM(s) Oral daily  pantoprazole    Tablet 40 milliGRAM(s) Oral before breakfast  polyethylene glycol 3350 17 Gram(s) Oral daily  senna 2 Tablet(s) Oral at bedtime    MEDICATIONS  (PRN):  dextrose Oral Gel 15 Gram(s) Oral once PRN Blood Glucose LESS THAN 70 milliGRAM(s)/deciliter  meclizine 25 milliGRAM(s) Oral every 12 hours PRN Dizziness    ASSESSMENT AND PLAN:    89 yo female who recently was discharged in june 2023 with a similar presentation, presented with dizziness "room spinning" at 6 am this morning.  She reports worsening left lower extremity weakness, who unlike previous episode in june, pt denies any aphasia this episode.       Dizziness / LLE weakness resolved ruled out CVA, possibly BPPV   - H/O Moderate ventriculomegaly   - orthosatic blood pressure wnl.   - Pt had similar episode last month was admitted, suspected secondary to TIA> pt was sent home [facility] and Neurology follow up  CT Angio Neck Stroke Protocol w/ IV Cont  1.  No evidence of acute large vessel occlusion. Normal perfusion images.  2.  Stable exam since 6/14/23 with calcific plaque at the ICA origins   producing mild (< 50%) stenosis.  CT Brain Stroke Protocol   1.  No evidence of acute intracranial hemorrhage or large territory   infarct. Stable exam since 6/14/2023.  2.  Stable chronic microvascular changes and multiple chronic infarcts.  - , TSH wnl , A1C 8  - c/w Lipitor, ASA and Plavix    - neurochecks per neurology   - PT/OT , s/s   - fall precautions  - TTE 6/2023: EF 72%, Mild MR, Mild TR  - not a candidate for TNK out of window   - last month ECHO done without bubble study,- fu  ECHO with bubble study pending    - MRI brain- Stable moderate chronic microvascular ischemic changes and scattered   chronic infarcts.Stable moderate ventriculomegaly - neuro fu   - consider prn meclizine TID, appropriate for downgrade to med/surg floor.    - last discharge from hospital , had [facility] arranged.     DLP, CAD hx/o stent -   -c/w asa, plavix, statin     HTN  - hold quinapril   - permissive HTN x 1 day    DMT2   - Home: Glimepiride   - A1C 8   - SSI ac hs.   - Avoid Hypoglycemia  - Target FS (140-180)    Hx/o breast Ca s/p R mastectomy   - in remission.     dvt/gi ppx/diet  dispo: STR or home PT.     barriers:  fu TTE bubble study. DC in 1-2 days, f/u neurology and cardiology (August 3rd)      MEDICATIONS  (STANDING):  aspirin  chewable 81 milliGRAM(s) Oral daily  atorvastatin 80 milliGRAM(s) Oral at bedtime  clopidogrel Tablet 75 milliGRAM(s) Oral daily  dextrose 5%. 1000 milliLiter(s) (50 mL/Hr) IV Continuous <Continuous>  dextrose 5%. 1000 milliLiter(s) (100 mL/Hr) IV Continuous <Continuous>  dextrose 50% Injectable 25 Gram(s) IV Push once  dextrose 50% Injectable 12.5 Gram(s) IV Push once  dextrose 50% Injectable 25 Gram(s) IV Push once  glucagon  Injectable 1 milliGRAM(s) IntraMuscular once  heparin   Injectable 5000 Unit(s) SubCutaneous every 12 hours  insulin lispro (ADMELOG) corrective regimen sliding scale   SubCutaneous three times a day before meals  insulin lispro (ADMELOG) corrective regimen sliding scale   SubCutaneous at bedtime  lisinopril 5 milliGRAM(s) Oral daily  pantoprazole    Tablet 40 milliGRAM(s) Oral before breakfast  polyethylene glycol 3350 17 Gram(s) Oral daily  senna 2 Tablet(s) Oral at bedtime    MEDICATIONS  (PRN):  dextrose Oral Gel 15 Gram(s) Oral once PRN Blood Glucose LESS THAN 70 milliGRAM(s)/deciliter  meclizine 25 milliGRAM(s) Oral every 12 hours PRN Dizziness    ASSESSMENT AND PLAN:    89 yo female who recently was discharged in june 2023 with a similar presentation, presented with dizziness "room spinning" at 6 am this morning.  She reports worsening left lower extremity weakness, who unlike previous episode in june, pt denies any aphasia this episode.       Dizziness / LLE weakness resolved ruled out CVA, possibly BPPV   - H/O Moderate ventriculomegaly   - orthosatic blood pressure wnl.   - Pt had similar episode last month was admitted, suspected secondary to TIA> pt was sent home [facility] and Neurology follow up  CT Angio Neck Stroke Protocol w/ IV Cont  1.  No evidence of acute large vessel occlusion. Normal perfusion images.  2.  Stable exam since 6/14/23 with calcific plaque at the ICA origins   producing mild (< 50%) stenosis.  CT Brain Stroke Protocol   1.  No evidence of acute intracranial hemorrhage or large territory   infarct. Stable exam since 6/14/2023.  2.  Stable chronic microvascular changes and multiple chronic infarcts.  - , TSH wnl , A1C 8  - c/w Lipitor, ASA and Plavix    - neurochecks per neurology   - PT/OT , s/s   - fall precautions  - TTE 6/2023: EF 72%, Mild MR, Mild TR  - not a candidate for TNK out of window   - last month ECHO done without bubble study,- fu  ECHO with bubble study pending    - MRI brain- Stable moderate chronic microvascular ischemic changes and scattered   chronic infarcts.Stable moderate ventriculomegaly - neuro fu   - consider prn meclizine TID, appropriate for downgrade to med/surg floor.    - last discharge from hospital , had [facility] arranged.     Mild b/l carotid disease  - cont asa, statin     DLP, CAD hx/o stent -   -c/w asa, plavix, statin     HTN  - hold quinapril   - permissive HTN x 1 day    DMT2   - Home: Glimepiride   - A1C 8   - SSI ac hs.   - Avoid Hypoglycemia  - Target FS (140-180)    Hx/o breast Ca s/p R mastectomy   - in remission.     dvt/gi ppx/diet  dispo: STR or home PT.     barriers:  fu TTE bubble study. DC in 1-2 days, f/u neurology and cardiology (August 3rd)

## 2023-07-31 NOTE — PROGRESS NOTE ADULT - SUBJECTIVE AND OBJECTIVE BOX
VAN [id number]    Subjective/Interval History   - no new deficits  - denies dizziness     ROS  - no chest pain or fever.     PHYSICAL EXAM  Vital Signs Last 24 Hrs  T(C): 35.8 (31 Jul 2023 14:45), Max: 36.8 (30 Jul 2023 23:54)  T(F): 96.5 (31 Jul 2023 14:45), Max: 98.3 (30 Jul 2023 23:54)  HR: 60 (31 Jul 2023 07:01) (60 - 68)  BP: 137/62 (31 Jul 2023 07:01) (127/58 - 170/68)  BP(mean): 89 (31 Jul 2023 07:01) (83 - 98)  RR: 20 (31 Jul 2023 14:45) (20 - 25)  SpO2: 94% (31 Jul 2023 07:01) (94% - 98%)    Parameters below as of 31 Jul 2023 07:01  Patient On (Oxygen Delivery Method): room air      GA : AAOX3, NAD   HEENT: PERRLA, EOMI  NECK: no JVD, no thyromegaly   CVS: S1 S2 no murmur no rubs no gallop  RESP: CTAB no wheeze, no rhonchi no rales  ABD: Soft, NT, ND, tympanic, no rebound or guarding   EXT; no pedal edema, no cyanosis  NEURO: AAOX3, no new focal deficits     LABS/ IMAGING                        11.7   8.29  )-----------( 146      ( 31 Jul 2023 05:11 )             35.5         07-31    136  |  102  |  19  ----------------------------<  195<H>  4.3   |  25  |  0.9    Ca    9.2      31 Jul 2023 05:11  Mg     1.7     07-31    TPro  6.1  /  Alb  3.9  /  TBili  0.4  /  DBili  x   /  AST  16  /  ALT  12  /  AlkPhos  66  07-31    CAPILLARY BLOOD GLUCOSE      POCT Blood Glucose.: 261 mg/dL (31 Jul 2023 11:15)  POCT Blood Glucose.: 171 mg/dL (31 Jul 2023 07:20)  POCT Blood Glucose.: 166 mg/dL (30 Jul 2023 21:07)  POCT Blood Glucose.: 120 mg/dL (30 Jul 2023 16:30)    Urinalysis Basic - ( 31 Jul 2023 05:11 )    Color: x / Appearance: x / SG: x / pH: x  Gluc: 195 mg/dL / Ketone: x  / Bili: x / Urobili: x   Blood: x / Protein: x / Nitrite: x   Leuk Esterase: x / RBC: x / WBC x   Sq Epi: x / Non Sq Epi: x / Bacteria: x      LIVER FUNCTIONS - ( 31 Jul 2023 05:11 )  Alb: 3.9 g/dL / Pro: 6.1 g/dL / ALK PHOS: 66 U/L / ALT: 12 U/L / AST: 16 U/L / GGT: x

## 2023-07-31 NOTE — PROGRESS NOTE ADULT - SUBJECTIVE AND OBJECTIVE BOX
Patient is a 88y old  Female with PMH of HTN, HLD, DM, CVA with residual L sided weakness, breast CA s/p mastectomy, CAD on Plavix and ASA presented 7/28 with worsened L sided weakness and dizziness described as room spinning sensation    7/31: hopsital course: pt was stroke code, had imaging work up including CTA h/n, MR without acute CVA. ECHO with bubble study pending. 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 Ordered Meclizine. Pt states she still has some room spinning sensation on head movement but overall feels improved, L sided weakness is improved. Has no complaints at this time.       INTERVAL HPI/OVERNIGHT EVENTS:   No overnight events   Afebrile, hemodynamically stable   ____________________________________________________________________________________  ICU Vital Signs Last 24 Hrs  T(C): 36.8 (31 Jul 2023 07:01), Max: 36.8 (30 Jul 2023 23:54)  T(F): 98.3 (31 Jul 2023 07:01), Max: 98.3 (30 Jul 2023 23:54)  HR: 60 (31 Jul 2023 07:01) (60 - 68)  BP: 137/62 (31 Jul 2023 07:01) (120/69 - 170/68)  BP(mean): 89 (31 Jul 2023 07:01) (83 - 98)  RR: 20 (31 Jul 2023 07:01) (20 - 25)  SpO2: 94% (31 Jul 2023 07:01) (94% - 98%)    O2 Parameters below as of 31 Jul 2023 07:01  Patient On (Oxygen Delivery Method): room air  ____________________________________________________________________________________        I&O's Summary    30 Jul 2023 07:01  -  31 Jul 2023 07:00  --------------------------------------------------------  IN: 0 mL / OUT: 825 mL / NET: -825 mL    31 Jul 2023 07:01  -  31 Jul 2023 11:37  --------------------------------------------------------  IN: 210 mL / OUT: 0 mL / NET: 210 mL  ____________________________________________________________________________________        LABS:                        11.7   8.29  )-----------( 146      ( 31 Jul 2023 05:11 )             35.5     07-31    136  |  102  |  19  ----------------------------<  195<H>  4.3   |  25  |  0.9    Ca    9.2      31 Jul 2023 05:11  Mg     1.7     07-31    TPro  6.1  /  Alb  3.9  /  TBili  0.4  /  DBili  x   /  AST  16  /  ALT  12  /  AlkPhos  66  07-31      Urinalysis Basic - ( 31 Jul 2023 05:11 )    Color: x / Appearance: x / SG: x / pH: x  Gluc: 195 mg/dL / Ketone: x  / Bili: x / Urobili: x   Blood: x / Protein: x / Nitrite: x   Leuk Esterase: x / RBC: x / WBC x   Sq Epi: x / Non Sq Epi: x / Bacteria: x      CAPILLARY BLOOD GLUCOSE      POCT Blood Glucose.: 261 mg/dL (31 Jul 2023 11:15)  POCT Blood Glucose.: 171 mg/dL (31 Jul 2023 07:20)  POCT Blood Glucose.: 166 mg/dL (30 Jul 2023 21:07)  POCT Blood Glucose.: 120 mg/dL (30 Jul 2023 16:30)  POCT Blood Glucose.: 243 mg/dL (30 Jul 2023 11:46)  ____________________________________________________________________________________      RADIOLOGY & ADDITIONAL TESTS:    MR head 7/29/23:   No acute intracranial pathology.  Stable moderate chronic microvascular ischemic changes and scattered chronic infarcts.  Stable moderate ventriculomegaly.    CTA head/neck and CT perfusion 7/28/23  1. No evidence of acute large vessel occlusion. Normal perfusion images.  2. Stable exam since 6/14/23 with calcific plaque at the ICA origins producing mild (< 50%) stenosis.  ____________________________________________________________________________________  Consultant(s) Notes Reviewed:  [x ] YES  [ ] NO    MEDICATIONS  (STANDING):  aspirin  chewable 81 milliGRAM(s) Oral daily  atorvastatin 80 milliGRAM(s) Oral at bedtime  clopidogrel Tablet 75 milliGRAM(s) Oral daily  dextrose 5%. 1000 milliLiter(s) (50 mL/Hr) IV Continuous <Continuous>  dextrose 5%. 1000 milliLiter(s) (100 mL/Hr) IV Continuous <Continuous>  dextrose 50% Injectable 25 Gram(s) IV Push once  dextrose 50% Injectable 12.5 Gram(s) IV Push once  dextrose 50% Injectable 25 Gram(s) IV Push once  glucagon  Injectable 1 milliGRAM(s) IntraMuscular once  heparin   Injectable 5000 Unit(s) SubCutaneous every 12 hours  insulin lispro (ADMELOG) corrective regimen sliding scale   SubCutaneous three times a day before meals  insulin lispro (ADMELOG) corrective regimen sliding scale   SubCutaneous at bedtime  lisinopril 5 milliGRAM(s) Oral daily  meclizine 25 milliGRAM(s) Oral once  pantoprazole    Tablet 40 milliGRAM(s) Oral before breakfast  polyethylene glycol 3350 17 Gram(s) Oral daily  senna 2 Tablet(s) Oral at bedtime    MEDICATIONS  (PRN):  dextrose Oral Gel 15 Gram(s) Oral once PRN Blood Glucose LESS THAN 70 milliGRAM(s)/deciliter  meclizine 25 milliGRAM(s) Oral every 12 hours PRN Dizziness  ____________________________________________________________________________________    PHYSICAL EXAM:  GENERAL: well appearing elderly female in [facility], eating breakfast  SKIN: Skin exam is warm and dry, no acute rash.  HEAD: Normocephalic; atraumatic.  EYES: PERRL, EOM intact; conjunctiva and sclera clear.  ENT: mmm  CARD: S1, S2 normal; no murmurs, gallops, or rubs. Regular rate and rhythm.  RESP: Normal respiratory effort, no tachypnea or distress. Lungs CTAB, no wheezes, rales or rhonchi.  ABD: soft, NT/ND.  EXT: Normal ROM. No clubbing, cyanosis or edema.  Neuro: A&Ox3, normal speech, CN II-XII intact, FROM & strength 5/5 x4 extremities. Sensation intact and equal.  PSYCH: Cooperative, appropriate.   _______________________________________________________________________________________    Care Discussed with Consultants/Other Providers [ x] YES  [ ] NO

## 2023-08-01 ENCOUNTER — TRANSCRIPTION ENCOUNTER (OUTPATIENT)
Age: 88
End: 2023-08-01

## 2023-08-01 VITALS — DIASTOLIC BLOOD PRESSURE: 72 MMHG | TEMPERATURE: 97 F | SYSTOLIC BLOOD PRESSURE: 138 MMHG | HEART RATE: 65 BPM

## 2023-08-01 LAB
ALBUMIN SERPL ELPH-MCNC: 3.9 G/DL — SIGNIFICANT CHANGE UP (ref 3.5–5.2)
ALP SERPL-CCNC: 65 U/L — SIGNIFICANT CHANGE UP (ref 30–115)
ALT FLD-CCNC: 14 U/L — SIGNIFICANT CHANGE UP (ref 0–41)
ANION GAP SERPL CALC-SCNC: 9 MMOL/L — SIGNIFICANT CHANGE UP (ref 7–14)
AST SERPL-CCNC: 18 U/L — SIGNIFICANT CHANGE UP (ref 0–41)
BASOPHILS # BLD AUTO: 0.02 K/UL — SIGNIFICANT CHANGE UP (ref 0–0.2)
BASOPHILS NFR BLD AUTO: 0.2 % — SIGNIFICANT CHANGE UP (ref 0–1)
BILIRUB SERPL-MCNC: 0.4 MG/DL — SIGNIFICANT CHANGE UP (ref 0.2–1.2)
BUN SERPL-MCNC: 18 MG/DL — SIGNIFICANT CHANGE UP (ref 10–20)
CALCIUM SERPL-MCNC: 9.3 MG/DL — SIGNIFICANT CHANGE UP (ref 8.4–10.5)
CHLORIDE SERPL-SCNC: 102 MMOL/L — SIGNIFICANT CHANGE UP (ref 98–110)
CO2 SERPL-SCNC: 26 MMOL/L — SIGNIFICANT CHANGE UP (ref 17–32)
CREAT SERPL-MCNC: 0.8 MG/DL — SIGNIFICANT CHANGE UP (ref 0.7–1.5)
EGFR: 71 ML/MIN/1.73M2 — SIGNIFICANT CHANGE UP
EOSINOPHIL # BLD AUTO: 0.01 K/UL — SIGNIFICANT CHANGE UP (ref 0–0.7)
EOSINOPHIL NFR BLD AUTO: 0.1 % — SIGNIFICANT CHANGE UP (ref 0–8)
GLUCOSE BLDC GLUCOMTR-MCNC: 220 MG/DL — HIGH (ref 70–99)
GLUCOSE SERPL-MCNC: 212 MG/DL — HIGH (ref 70–99)
HCT VFR BLD CALC: 35.3 % — LOW (ref 37–47)
HGB BLD-MCNC: 11.5 G/DL — LOW (ref 12–16)
IMM GRANULOCYTES NFR BLD AUTO: 0.4 % — HIGH (ref 0.1–0.3)
LYMPHOCYTES # BLD AUTO: 1.13 K/UL — LOW (ref 1.2–3.4)
LYMPHOCYTES # BLD AUTO: 12.5 % — LOW (ref 20.5–51.1)
MAGNESIUM SERPL-MCNC: 1.7 MG/DL — LOW (ref 1.8–2.4)
MCHC RBC-ENTMCNC: 27.4 PG — SIGNIFICANT CHANGE UP (ref 27–31)
MCHC RBC-ENTMCNC: 32.6 G/DL — SIGNIFICANT CHANGE UP (ref 32–37)
MCV RBC AUTO: 84.2 FL — SIGNIFICANT CHANGE UP (ref 81–99)
MONOCYTES # BLD AUTO: 0.66 K/UL — HIGH (ref 0.1–0.6)
MONOCYTES NFR BLD AUTO: 7.3 % — SIGNIFICANT CHANGE UP (ref 1.7–9.3)
NEUTROPHILS # BLD AUTO: 7.17 K/UL — HIGH (ref 1.4–6.5)
NEUTROPHILS NFR BLD AUTO: 79.5 % — HIGH (ref 42.2–75.2)
NRBC # BLD: 0 /100 WBCS — SIGNIFICANT CHANGE UP (ref 0–0)
PLATELET # BLD AUTO: 149 K/UL — SIGNIFICANT CHANGE UP (ref 130–400)
PMV BLD: 9.2 FL — SIGNIFICANT CHANGE UP (ref 7.4–10.4)
POTASSIUM SERPL-MCNC: 4.7 MMOL/L — SIGNIFICANT CHANGE UP (ref 3.5–5)
POTASSIUM SERPL-SCNC: 4.7 MMOL/L — SIGNIFICANT CHANGE UP (ref 3.5–5)
PROT SERPL-MCNC: 6.3 G/DL — SIGNIFICANT CHANGE UP (ref 6–8)
RBC # BLD: 4.19 M/UL — LOW (ref 4.2–5.4)
RBC # FLD: 13.3 % — SIGNIFICANT CHANGE UP (ref 11.5–14.5)
SODIUM SERPL-SCNC: 137 MMOL/L — SIGNIFICANT CHANGE UP (ref 135–146)
WBC # BLD: 9.03 K/UL — SIGNIFICANT CHANGE UP (ref 4.8–10.8)
WBC # FLD AUTO: 9.03 K/UL — SIGNIFICANT CHANGE UP (ref 4.8–10.8)

## 2023-08-01 PROCEDURE — 99239 HOSP IP/OBS DSCHRG MGMT >30: CPT

## 2023-08-01 RX ORDER — SENNA PLUS 8.6 MG/1
2 TABLET ORAL
Qty: 0 | Refills: 0 | DISCHARGE
Start: 2023-08-01

## 2023-08-01 RX ORDER — MAGNESIUM SULFATE 500 MG/ML
2 VIAL (ML) INJECTION ONCE
Refills: 0 | Status: COMPLETED | OUTPATIENT
Start: 2023-08-01 | End: 2023-08-01

## 2023-08-01 RX ORDER — POLYETHYLENE GLYCOL 3350 17 G/17G
17 POWDER, FOR SOLUTION ORAL
Qty: 0 | Refills: 0 | DISCHARGE
Start: 2023-08-01

## 2023-08-01 RX ORDER — MECLIZINE HCL 12.5 MG
1 TABLET ORAL
Qty: 42 | Refills: 0
Start: 2023-08-01 | End: 2023-08-14

## 2023-08-01 RX ADMIN — Medication 3: at 11:45

## 2023-08-01 RX ADMIN — CLOPIDOGREL BISULFATE 75 MILLIGRAM(S): 75 TABLET, FILM COATED ORAL at 11:05

## 2023-08-01 RX ADMIN — LISINOPRIL 5 MILLIGRAM(S): 2.5 TABLET ORAL at 05:29

## 2023-08-01 RX ADMIN — HEPARIN SODIUM 5000 UNIT(S): 5000 INJECTION INTRAVENOUS; SUBCUTANEOUS at 05:29

## 2023-08-01 RX ADMIN — Medication 81 MILLIGRAM(S): at 11:05

## 2023-08-01 RX ADMIN — Medication 2: at 08:07

## 2023-08-01 RX ADMIN — Medication 25 GRAM(S): at 11:04

## 2023-08-01 RX ADMIN — PANTOPRAZOLE SODIUM 40 MILLIGRAM(S): 20 TABLET, DELAYED RELEASE ORAL at 05:29

## 2023-08-01 NOTE — DISCHARGE NOTE PROVIDER - NSDCMRMEDTOKEN_GEN_ALL_CORE_FT
aspirin 81 mg oral tablet: 1 tab(s) orally once a day  atorvastatin 40 mg oral tablet: 1 orally once a day (at bedtime)  glimepiride 4 mg oral tablet: 1 tab(s) orally once a day  meclizine 25 mg oral tablet: 1 tab(s) orally 3 times a day as needed for  dizziness  Plavix 75 mg oral tablet: 1 tab(s) orally once a day  polyethylene glycol 3350 oral powder for reconstitution: 17 gram(s) orally once a day  quinapril 5 mg oral tablet: 1 orally once a day  senna leaf extract oral tablet: 2 tab(s) orally once a day (at bedtime)

## 2023-08-01 NOTE — DISCHARGE NOTE NURSING/CASE MANAGEMENT/SOCIAL WORK - PATIENT PORTAL LINK FT
You can access the FollowMyHealth Patient Portal offered by [facility] by registering at the following website: [email or web address]. By joining [employer]’s FollowMyHealth portal, you will also be able to view your health information using other applications (apps) compatible with our system.

## 2023-08-01 NOTE — DISCHARGE NOTE NURSING/CASE MANAGEMENT/SOCIAL WORK - NSDCPEFALRISK_GEN_ALL_CORE
For information on Fall & Injury Prevention, visit: [email or web address] OR  [email or web address] OR  https://www.cdc.gov/steadi/patient.html

## 2023-08-01 NOTE — DISCHARGE NOTE PROVIDER - NSDCFUSCHEDAPPT_GEN_ALL_CORE_FT
Abby Mcfadden  [facility] Physician [facility]  NEUROLOGY [address] [place] [address]  Scheduled Appointment: 08/28/2023

## 2023-08-01 NOTE — DISCHARGE NOTE NURSING/CASE MANAGEMENT/SOCIAL WORK - NSDCPEPTSTROKESIGNS_GEN_ALL_CORE
Goals of Care Note    Patient: Ja Amador Date: 10/8/2020   MRN: [id number]  Date of admission: (Not on file)     A discussion of the patient's Goals of Care has been completed with the patient and spouse regarding the patient's current condition, prognosis, and  future goals of care. The patient and spouse has a good understanding of the patient’s current condition and overall disease process.     The Goals of Care include   No Resuscitation, Maximal Medical Support  (continue usual medical care until cessation of heartbeat and respiration)     The anticipated/desired location for the patient upon discharge would be  Home    The following additional care is recommended  will prior auth for doectaxel and ramucimumab   Will follow-up in 2 weeks to start treatment  Will see his dentist for left mouth pain  Will send out his biopsy tissue for [facility]    Additional discussion:     Goals of care discussion:    MD reviewed that this is not a curable situation.      With no further treatment, he could potentially have   6 months or less.  With treatment, he could add a   couple additional months.    Pt. States that he does want to try the next   treatment to see how he tolerates it.    Discussed that it is always his choice not to have   active cancer treatment and that we would support   his decision and help him get hospice services to   help with symptom managements.  Explained   what hospice was and how they could help them   with end of life support.    Code status - DNR    State DNR ordered signed and bracelet applied    Will continue to have goals of care discussions.      Shannan Ledesma RN        
Sudden numbness or weakness of the face, arm, or leg, especially on one side of the body. Confusion, trouble speaking or understanding. Trouble seeing in one or both eyes. Trouble walking, dizziness, loss of balance or coordination. Severe headache.

## 2023-08-01 NOTE — DISCHARGE NOTE PROVIDER - CARE PROVIDER_API CALL
Leonel Henry  Neurology  [address], [address]  [place], [place] [id number]  Phone: [phone]  Fax: [phone]  Follow Up Time: 1 week    Shorty Carter  Cardiovascular Disease  [address] [place] [id number]  Phone: [phone]  Fax: [phone]  Follow Up Time: 1-3 days    Brenton Duckworth  Internal Medicine  [address] [place] [id number]  Phone: [phone]  Fax: [phone]  Follow Up Time: 1 week

## 2023-08-01 NOTE — DISCHARGE NOTE PROVIDER - HOSPITAL COURSE
Patient is a 88y old  Female with PMH of HTN, HLD, DM, CVA with residual L sided weakness, breast CA s/p mastectomy, CAD on Plavix and ASA presented 7/28 with worsened L sided weakness and dizziness described as room spinning sensation. Stroke code was called; she had imaging work up including CTA h/n, MR without acute CVA. ECHO with bubble study was unremarkable. 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    Imaging:  -CT Angio Neck Stroke Protocol w/ IV Cont: No evidence of acute large vessel occlusion. Normal perfusion images.;  Stable exam since 6/14/23 with calcific plaque at the ICA origins producing mild (< 50%) stenosis.  -CT Brain Stroke Protocol: No evidence of acute intracranial hemorrhage or large territory infarct. Stable exam since 6/14/2023. Stable chronic microvascular changes and multiple chronic infarcts.  -MRI brain- Stable moderate chronic microvascular ischemic changes and scattered chronic infarcts. Stable moderate ventriculomegaly  -ECHO: Normal global left ventricular systolic function. LV Ejection Fraction by Potter's Method with a biplane EF of 67 %. Normal right ventricular size and function. Normal left atrial size. Normal right atrial size. Color flow doppler and intravenous injection of agitated saline demonstrates the presence of an intact intra atrial septum. There is mild aortic root calcification. Mild to moderate mitral annular calcification. Moderate thickening and calcification of the posterior mitral valve leaflet. Trace mitral valve regurgitation. Moderate tricuspid regurgitation. Normal pulmonary artery pressure. There is no evidence of pericardial effusion.    Things to follow up:  -PCP follow up in 1-2 weeks  -Follow up with Neurologist in 1 week as outpatient at the stroke clinic  -Follow up with cardiologist in 1 week for outpatient MCOT  -Take meclizine as needed for dizziness  -Continue taking home medications    Patient was seen and examined at bedside; patient has no new complaints; discharge planning was discussed.  Vital Signs Last 24 Hrs  T(C): 36.1 (01 Aug 2023 05:14), Max: 36.2 (31 Jul 2023 19:26)  T(F): 97 (01 Aug 2023 05:14), Max: 97.1 (31 Jul 2023 19:26)  HR: 74 (01 Aug 2023 05:14) (65 - 74)  BP: 157/81 (01 Aug 2023 05:14) (132/61 - 159/58)  BP(mean): 88 (31 Jul 2023 14:45) (88 - 88)  RR: 16 (01 Aug 2023 05:14) (16 - 20)  SpO2: 98% (31 Jul 2023 19:26) (97% - 98%)  GA : AAOX3, NAD   HEENT: PERRLA, EOMI  NECK: no JVD, no thyromegaly   CVS: S1 S2 no murmur no rubs no gallop  RESP: CTAB no wheeze, no rhonchi no rales  ABD: Soft, NT, ND, tympanic, no rebound or guarding   EXT; no pedal edema, no cyanosis  NEURO: AAOX3, no new focal deficits

## 2023-08-01 NOTE — DISCHARGE NOTE NURSING/CASE MANAGEMENT/SOCIAL WORK - NSDCVIVACCINE_GEN_ALL_CORE_FT
Chief Complaint   Patient presents with   • Congestion     x1 week   • Cough     x1 week with headache       VISIT DIAGNOSIS:   1. Nasal congestion        HPI  Kim Elder is a 11 year old female who presents to the Urgent Care Clinic with dad providing history; chief complaint of nasal congestion for about a week.  Some coughing and headache.  Started with a scratchy throat.  Headache has just started today, mostly frontal, middle and left.  Blowing nose a lot with thick drainage.  Coughing up some mucus and seems thick.  This morning temp was ~100 F.  Denies shortness of breath. No wheezing.  Has some ear discomfort. ?decreased smell and taste today.    Denies known direct exposure to Covid 19- other sick kids at school.   no covid vaccine.   Tried acetaminophen this morning to relieve symptoms.      PHYSICAL EXAM  Vitals:    12/04/21 1023   BP: 108/66   Pulse: 108   Temp: 99.5 °F (37.5 °C)   SpO2: 98%   Weight: 30.9 kg (68 lb 3 oz)       General: 11 year old female alert and in no apparent distress. Non toxic appearance.  Well nourished and well hydrated.  Heart:  Regular rate and rhythm with no murmur.  Lungs: Clear to auscultation bilaterally. No wheezes, rhonchi, or rales.  No respiratory distress.  HEENT:   Head: normocephalic, Atraumatic.  Eyes: Without conjunctival injection or exudate.  No scleral icterus.  PERRL.  Ears:   Left TM normal, no BEAU, no erythema; Left EAC clear  Right TM normal, no BEAU, no erythema; Right EAC clear  Nose:  Mild inflammation of mucosa/turbinates. Clear drainage.  Some frontal sinus tenderness with palpation.   Mouth/throat: Mucous membranes moist. No oral lesions seen.  Dentition normal. Pharynx without erythema or exudate.   Neck: No cervical or supraclavicular lymphadenopathy, no masses  Skin: Warm and dry with no rash.    DIAGNOSTICS  Covid PCR pending     ASSESSMENT AND PLAN:  Plan   1. Nasal congestion      I explained that various upper respiratory viruses could be  the culprit for her symptoms and not surprised that cold symptoms have persisted.  Possibility of covid-19 and was tested today.  Should maintain isolation until test result final to determine next steps.  Monitor for continued appropriate activity level and po intake. May use honey, humidifier, saline nasal spray, vaporizer to help with symptoms. Option to use acetaminophen or ibuprofen if needed for fever and discomfort- dosing discussed.   If there is concern regarding new symptoms, fever, or breathing trouble, then check in with primary care physician or go to ED.             No Vaccines Administered.

## 2023-08-01 NOTE — DISCHARGE NOTE PROVIDER - NSDCCPCAREPLAN_GEN_ALL_CORE_FT
PRINCIPAL DISCHARGE DIAGNOSIS  Diagnosis: Dizziness  Assessment and Plan of Treatment: Presented with worsened L sided weakness and dizziness described as room spinning sensation. Stroke code was called; she had imaging work up including CTA h/n, MR without acute CVA. ECHO with bubble study was unremarkable. 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  Imaging:  -CT Angio Neck Stroke Protocol w/ IV Cont: No evidence of acute large vessel occlusion. Normal perfusion images.;  Stable exam since 6/14/23 with calcific plaque at the ICA origins producing mild (< 50%) stenosis.  -CT Brain Stroke Protocol: No evidence of acute intracranial hemorrhage or large territory infarct. Stable exam since 6/14/2023. Stable chronic microvascular changes and multiple chronic infarcts.  -MRI brain- Stable moderate chronic microvascular ischemic changes and scattered chronic infarcts. Stable moderate ventriculomegaly  -ECHO: Normal global left ventricular systolic function. LV Ejection Fraction by Potter's Method with a biplane EF of 67 %. Normal right ventricular size and function. Normal left atrial size. Normal right atrial size. Color flow doppler and intravenous injection of agitated saline demonstrates the presence of an intact intra atrial septum. There is mild aortic root calcification. Mild to moderate mitral annular calcification. Moderate thickening and calcification of the posterior mitral valve leaflet. Trace mitral valve regurgitation. Moderate tricuspid regurgitation. Normal pulmonary artery pressure. There is no evidence of pericardial effusion.  Things to follow up:  -PCP follow up in 1-2 weeks  -Follow up with Neurologist in 1 week as outpatient at the stroke clinic  -Follow up with cardiologist in 1 week for outpatient MCOT  -Take meclizine as needed for dizziness  -Continue taking home medications

## 2023-08-01 NOTE — DISCHARGE NOTE PROVIDER - PROVIDER TOKENS
PROVIDER:[TOKEN:[[id number]],FOLLOWUP:[1 week]],PROVIDER:[TOKEN:[[id number]],FOLLOWUP:[1-3 days]],PROVIDER:[TOKEN:[[id number]],FOLLOWUP:[1 week]]

## 2023-08-01 NOTE — DISCHARGE NOTE PROVIDER - CARE PROVIDERS DIRECT ADDRESSES
,[email or web address],[email or web address],DirectAddress_Unknown

## 2023-08-02 ENCOUNTER — EMERGENCY (EMERGENCY)
Facility: HOSPITAL | Age: 88
LOS: 0 days | Discharge: HOME | End: 2023-08-02
Attending: EMERGENCY MEDICINE
Payer: MEDICARE

## 2023-08-02 VITALS
SYSTOLIC BLOOD PRESSURE: 162 MMHG | WEIGHT: 115.08 LBS | HEIGHT: 62 IN | DIASTOLIC BLOOD PRESSURE: 71 MMHG | TEMPERATURE: 97 F | OXYGEN SATURATION: 99 % | HEART RATE: 82 BPM | RESPIRATION RATE: 16 BRPM

## 2023-08-02 VITALS
SYSTOLIC BLOOD PRESSURE: 152 MMHG | OXYGEN SATURATION: 97 % | RESPIRATION RATE: 18 BRPM | TEMPERATURE: 97 F | HEART RATE: 80 BPM | DIASTOLIC BLOOD PRESSURE: 67 MMHG

## 2023-08-02 DIAGNOSIS — Z79.84 LONG TERM (CURRENT) USE OF ORAL HYPOGLYCEMIC DRUGS: ICD-10-CM

## 2023-08-02 DIAGNOSIS — Z86.73 PERSONAL HISTORY OF TRANSIENT ISCHEMIC ATTACK (TIA), AND CEREBRAL INFARCTION WITHOUT RESIDUAL DEFICITS: ICD-10-CM

## 2023-08-02 DIAGNOSIS — M79.89 OTHER SPECIFIED SOFT TISSUE DISORDERS: ICD-10-CM

## 2023-08-02 DIAGNOSIS — Z88.6 ALLERGY STATUS TO ANALGESIC AGENT: ICD-10-CM

## 2023-08-02 DIAGNOSIS — Z85.3 PERSONAL HISTORY OF MALIGNANT NEOPLASM OF BREAST: ICD-10-CM

## 2023-08-02 DIAGNOSIS — Z90.10 ACQUIRED ABSENCE OF UNSPECIFIED BREAST AND NIPPLE: Chronic | ICD-10-CM

## 2023-08-02 DIAGNOSIS — Z79.02 LONG TERM (CURRENT) USE OF ANTITHROMBOTICS/ANTIPLATELETS: ICD-10-CM

## 2023-08-02 DIAGNOSIS — I25.10 ATHEROSCLEROTIC HEART DISEASE OF NATIVE CORONARY ARTERY WITHOUT ANGINA PECTORIS: ICD-10-CM

## 2023-08-02 DIAGNOSIS — M79.604 PAIN IN RIGHT LEG: ICD-10-CM

## 2023-08-02 DIAGNOSIS — I10 ESSENTIAL (PRIMARY) HYPERTENSION: ICD-10-CM

## 2023-08-02 DIAGNOSIS — E11.9 TYPE 2 DIABETES MELLITUS WITHOUT COMPLICATIONS: ICD-10-CM

## 2023-08-02 DIAGNOSIS — Z90.11 ACQUIRED ABSENCE OF RIGHT BREAST AND NIPPLE: ICD-10-CM

## 2023-08-02 DIAGNOSIS — E78.5 HYPERLIPIDEMIA, UNSPECIFIED: ICD-10-CM

## 2023-08-02 DIAGNOSIS — Z79.82 LONG TERM (CURRENT) USE OF ASPIRIN: ICD-10-CM

## 2023-08-02 PROCEDURE — 99284 EMERGENCY DEPT VISIT MOD MDM: CPT

## 2023-08-02 PROCEDURE — 99284 EMERGENCY DEPT VISIT MOD MDM: CPT | Mod: 25

## 2023-08-02 PROCEDURE — 93970 EXTREMITY STUDY: CPT

## 2023-08-02 PROCEDURE — 93970 EXTREMITY STUDY: CPT | Mod: 26

## 2023-08-02 NOTE — ED PROVIDER NOTE - ATTENDING APP SHARED VISIT CONTRIBUTION OF CARE
88-year-old female past medical history noted presents from urgent care for further evaluation of right lower extremity pain and swelling.  Patient was discharged from the hospital yesterday for work-up of stroke.  Patient states that while here she developed right calf pain.  No history of trauma.  No shortness of breath.  On exam patient is in NAD, AAOx3, pleasant, positive tenderness to right calf with swelling, no skin changes

## 2023-08-02 NOTE — ED ADULT NURSE NOTE - NSFALLHARMRISKINTERV_ED_ALL_ED

## 2023-08-02 NOTE — ED PROVIDER NOTE - PHYSICAL EXAMINATION
CONST: NAD  CARD: S1 S2; No jvd  RESP: Equal BS B/L, No wheezes, rhonchi or rales. No distress  MS: Normal ROM in all extremities. pulses 2 +. R calf tenderness and swelling  SKIN: Warm, dry, no acute rashes. Good turgor  NEURO: A&Ox3

## 2023-08-02 NOTE — ED PROVIDER NOTE - NSFOLLOWUPINSTRUCTIONS_ED_ALL_ED_FT
Follow up with PMD in 1-2 days.    WHAT YOU NEED TO KNOW:    Leg edema is swelling caused by fluid buildup. Your legs may swell if you sit or stand for long periods of time, are pregnant, or are injured. Swelling may also occur if you have heart failure or circulation problems. This means that your heart does not pump blood through your body as it should.    DISCHARGE INSTRUCTIONS:    Self-care:     Elevate your legs: Raise your legs above the level of your heart as often as you can. This will help decrease swelling and pain. Prop your legs on pillows or blankets to keep them elevated comfortably.      Wear pressure stockings: These tight stockings put pressure on your legs to promote blood flow and prevent blood clots. Wear the stockings during the day. Do not wear them while you sleep.      Apply heat: Heat helps decrease pain and swelling. Apply heat on the area for 20 to 30 minutes every 2 hours for as many days as directed.       Stay active: Do not stand or sit for long periods of time. Ask your healthcare provider about the best exercise plan for you.      Eat healthy foods: Healthy foods include fruits, vegetables, whole-grain breads, low-fat dairy products, beans, lean meats, and fish. Ask if you need to be on a special diet. Limit salt. Salt will make your body hold even more fluid.    Follow up with your healthcare provider as directed: Write down your questions so you remember to ask them during your visits.     Contact your healthcare provider if:     You have a fever or feel more tired than usual.      The veins in your legs look larger than usual. They may look full or bulging.      Your legs itch or feel heavy.      You have red or white areas or sores on your legs. The skin may also appear dimpled or have indentations.      You are gaining weight.      You have trouble moving your ankles.      The swelling does not go away, or other parts of your body swell.      You have questions or concerns about your condition or care.    Return to the emergency department if:     You cannot walk.      You feel faint or confused.       Your skin turns blue or gray.      Your leg feels warm, tender, and painful. It may be swollen and red.      You have chest pain or trouble breathing that is worse when you lie down.      You suddenly feel lightheaded and have trouble breathing.      You have new and sudden chest pain. You may have more pain when you take deep breaths or cough. You may also cough up blood.

## 2023-08-02 NOTE — ED PROVIDER NOTE - CLINICAL SUMMARY MEDICAL DECISION MAKING FREE TEXT BOX
Duplex obtained.  Preliminary report is negative for DVT.  Results were discussed with patient and family member.  Recommend Tylenol as needed for pain, Elevation.  Discussed with patient and family the patient will need repeat study within the next 5 to 7 days.  Patient will follow-up with PMD

## 2023-08-02 NOTE — ED PROVIDER NOTE - PATIENT PORTAL LINK FT
You can access the FollowMyHealth Patient Portal offered by [facility] by registering at the following website: [email or web address]. By joining [employer]’s FollowMyHealth portal, you will also be able to view your health information using other applications (apps) compatible with our system.

## 2023-08-02 NOTE — ED PROVIDER NOTE - OBJECTIVE STATEMENT
88-year-old female past medical history hypertension, hyperlipidemia, diabetes, CVA, breast cancer, CAD presents for evaluation of right lower extremity swelling.  Patient was recently hospitalized for CVA evaluation upon discharge developed right calf pain with associated swelling, aggravated by touch and weightbearing, no relieving factors.  Denies numbness, weakness, discoloration, fever, chest pain, shortness of breath.

## 2023-08-03 ENCOUNTER — APPOINTMENT (OUTPATIENT)
Dept: CARDIOLOGY | Facility: CLINIC | Age: 88
End: 2023-08-03

## 2023-08-06 DIAGNOSIS — E11.9 TYPE 2 DIABETES MELLITUS WITHOUT COMPLICATIONS: ICD-10-CM

## 2023-08-06 DIAGNOSIS — I69.354 HEMIPLEGIA AND HEMIPARESIS FOLLOWING CEREBRAL INFARCTION AFFECTING LEFT NON-DOMINANT SIDE: ICD-10-CM

## 2023-08-06 DIAGNOSIS — Z85.3 PERSONAL HISTORY OF MALIGNANT NEOPLASM OF BREAST: ICD-10-CM

## 2023-08-06 DIAGNOSIS — I25.10 ATHEROSCLEROTIC HEART DISEASE OF NATIVE CORONARY ARTERY WITHOUT ANGINA PECTORIS: ICD-10-CM

## 2023-08-06 DIAGNOSIS — Z87.891 PERSONAL HISTORY OF NICOTINE DEPENDENCE: ICD-10-CM

## 2023-08-06 DIAGNOSIS — Z90.11 ACQUIRED ABSENCE OF RIGHT BREAST AND NIPPLE: ICD-10-CM

## 2023-08-06 DIAGNOSIS — Z79.02 LONG TERM (CURRENT) USE OF ANTITHROMBOTICS/ANTIPLATELETS: ICD-10-CM

## 2023-08-06 DIAGNOSIS — Z79.82 LONG TERM (CURRENT) USE OF ASPIRIN: ICD-10-CM

## 2023-08-06 DIAGNOSIS — Z88.5 ALLERGY STATUS TO NARCOTIC AGENT: ICD-10-CM

## 2023-08-06 DIAGNOSIS — I10 ESSENTIAL (PRIMARY) HYPERTENSION: ICD-10-CM

## 2023-08-06 DIAGNOSIS — Z95.5 PRESENCE OF CORONARY ANGIOPLASTY IMPLANT AND GRAFT: ICD-10-CM

## 2023-08-06 DIAGNOSIS — E78.5 HYPERLIPIDEMIA, UNSPECIFIED: ICD-10-CM

## 2023-08-06 DIAGNOSIS — Z79.84 LONG TERM (CURRENT) USE OF ORAL HYPOGLYCEMIC DRUGS: ICD-10-CM

## 2023-08-06 DIAGNOSIS — H81.10 BENIGN PAROXYSMAL VERTIGO, UNSPECIFIED EAR: ICD-10-CM

## 2023-09-01 ENCOUNTER — APPOINTMENT (OUTPATIENT)
Dept: NEUROLOGY | Facility: CLINIC | Age: 88
End: 2023-09-01
Payer: MEDICARE

## 2023-09-01 VITALS
BODY MASS INDEX: 21.16 KG/M2 | HEART RATE: 80 BPM | SYSTOLIC BLOOD PRESSURE: 162 MMHG | WEIGHT: 115 LBS | DIASTOLIC BLOOD PRESSURE: 71 MMHG | TEMPERATURE: 97.8 F | OXYGEN SATURATION: 100 % | HEIGHT: 62 IN

## 2023-09-01 VITALS — DIASTOLIC BLOOD PRESSURE: 76 MMHG | SYSTOLIC BLOOD PRESSURE: 175 MMHG | HEART RATE: 83 BPM

## 2023-09-01 DIAGNOSIS — G45.9 TRANSIENT CEREBRAL ISCHEMIC ATTACK, UNSPECIFIED: ICD-10-CM

## 2023-09-01 DIAGNOSIS — H91.93 UNSPECIFIED HEARING LOSS, BILATERAL: ICD-10-CM

## 2023-09-01 PROCEDURE — 99214 OFFICE O/P EST MOD 30 MIN: CPT

## 2023-09-01 RX ORDER — GABAPENTIN 100 MG
100 TABLET ORAL 3 TIMES DAILY
Refills: 0 | Status: ACTIVE | COMMUNITY

## 2023-09-01 RX ORDER — CLOPIDOGREL BISULFATE 75 MG/1
75 TABLET, FILM COATED ORAL DAILY
Refills: 0 | Status: ACTIVE | COMMUNITY

## 2023-09-01 RX ORDER — GLIMEPIRIDE 4 MG/1
4 TABLET ORAL TWICE DAILY
Refills: 0 | Status: ACTIVE | COMMUNITY

## 2023-09-01 RX ORDER — MECLIZINE HYDROCHLORIDE 25 MG/1
25 TABLET ORAL 4 TIMES DAILY
Refills: 0 | Status: ACTIVE | COMMUNITY

## 2023-09-01 RX ORDER — ATORVASTATIN CALCIUM 40 MG/1
40 TABLET, FILM COATED ORAL DAILY
Refills: 0 | Status: ACTIVE | COMMUNITY

## 2023-09-01 RX ORDER — METFORMIN HYDROCHLORIDE 1000 MG/1
1000 TABLET, COATED ORAL TWICE DAILY
Refills: 0 | Status: ACTIVE | COMMUNITY

## 2023-09-01 RX ORDER — ASPIRIN 81 MG
81 TABLET, DELAYED RELEASE (ENTERIC COATED) ORAL
Refills: 0 | Status: ACTIVE | COMMUNITY

## 2023-09-01 NOTE — PHYSICAL EXAM
[General Appearance - Alert] : alert [General Appearance - In No Acute Distress] : in no acute distress [General Appearance - Well Nourished] : well nourished [General Appearance - Well Developed] : well developed [Oriented To Time, Place, And Person] : oriented to person, place, and time [Affect] : the affect was normal [Person] : oriented to person [Place] : oriented to place [Time] : oriented to time [Naming Objects] : no difficulty naming common objects [Fluency] : fluency intact [Comprehension] : comprehension intact [Cranial Nerves Optic (II)] : visual acuity intact bilaterally,  visual fields full to confrontation, pupils equal round and reactive to light [Cranial Nerves Trigeminal (V)] : facial sensation intact symmetrically [Cranial Nerves Facial (VII)] : face symmetrical [Cranial Nerves Glossopharyngeal (IX)] : tongue and palate midline [Cranial Nerves Accessory (XI - Cranial And Spinal)] : head turning and shoulder shrug symmetric [Finger Rub Not Baker] : finger rub was not heard [Motor Tone] : muscle tone was normal in all four extremities [Motor Strength] : muscle strength was normal in all four extremities [Paresis Pronator Drift Right-Sided] : no pronator drift on the right [Paresis Pronator Drift Left-Sided] : no pronator drift on the left [Sensation Tactile Decrease] : light touch was intact [Coordination - Dysmetria Impaired Finger-to-Nose Bilateral] : present bilaterally [2+] : Patella left 2+ [FreeTextEntry8] : Wide based gait, ambulates with arms held out to the side, unsteady gait, small steps and multistep turn.

## 2023-09-01 NOTE — HISTORY OF PRESENT ILLNESS
[FreeTextEntry1] : Pt is a 87 yo F with PMHx of chronic right cerebellar and occipital stroke (uncertain when they occurred), hearing loss, HTN, HLD, DM, CAD on DAPT, breast ca s/p mastectomy, who presents for hospital follow up. Pt was seen at [facility] with vertigo, was diagnosed with BPPV. Pt endorses persistent vertigo, states that it began in her teens, got worse after the stroke many years ago, and then recently got even worse. States that she uses a walker outside, uses her furniture to hold on to when ambulating at home. Denies recent falls. Denies new unilateral weakness, clumsiness, numbness, speech or vision difficulty. She lives alone, able to complete ADL's independently. Does her grocery shopping and cooking on her own. She stopped driving 3 years ago due to equilibrium difficulty.

## 2023-09-01 NOTE — DISCUSSION/SUMMARY
[FreeTextEntry1] : Pt is a 87 yo F with PMHx of chronic right cerebellar and occipital stroke (uncertain when they occurred), hearing loss, HTN, HLD, DM, CAD on DAPT, breast ca s/p mastectomy, who presents for hospital follow up.  # Ataxia: likely multifactorial with h/o right cerebellar stroke and noted h/o vertigo since her teens. No acute ischemic appreciated on recent MRI brain. NIHSS 2 (BUE dysmetria), mRS 1.  - Continue DAPT/statin (uncertain whether this was prescribed for stroke or CAD or both) - ENT referral for vertigo and hearing loss - Vestibular rehab - Agree with cardiology f/u  RTC in 6 mo

## 2023-09-12 ENCOUNTER — APPOINTMENT (OUTPATIENT)
Dept: OTOLARYNGOLOGY | Facility: CLINIC | Age: 88
End: 2023-09-12

## 2023-11-08 NOTE — OCCUPATIONAL THERAPY INITIAL EVALUATION ADULT - LIVES WITH, PROFILE
Vascular Surgery Progress Note    Patient: Gunner Mccarty                 Sex: male              MRN:  [id number]    Subjective:   No acute events overnight.  Patient underwent craniotomy for ICH yesterday 11/7 with NSGY, EVD in place.  Patient is intubated and sedated.   Bloody discharge with soaked dressings noted    Objective:     Visit Vitals  /69   Pulse 72   Temp 99 °F (37.2 °C)   Resp 16   Ht 5' 7\" (1.702 m)   Wt 66.2 kg (145 lb 15.1 oz)   SpO2 100%   BMI 22.86 kg/m²      I/O last 3 completed shifts:  In: 2832.6 [I.V.:2424.8; Blood:176; IV Piggyback:231.8]  Out: 2405 [Urine:2085; Drains:120; Blood:200]     Physical Exam:  General: Sedated, intubated  HEENT: R side of head covered in blood soaked bandages with blood trickling down to ear and neck, EVD drain in place with minimal sanguinous output, OG tube in place  CV: Bilateral radial pulses 2+, LLE DP/TP pulses undetectable via Doppler, left popliteal triphasic, RLE DP pulse 1+ palpable  Abdomen soft, nondistended  Ext: Groin incision site soft without hematoma, dressing in place.  LLE cold to touch with mottling of toes, appears darker  T/L/D: Fernando, a-line, OGT, PIVs, ETT, EVD    Lab/Data Reviewed:  Recent Labs   Lab 11/08/23  0402 11/07/23  1821 11/07/23  0741 11/07/23  0409 11/06/23  0724 11/05/23  0600 11/04/23  0946   SODIUM 137 138 138 137 137 137 136   POTASSIUM 4.7 4.0 4.0 3.7 3.7 4.0 3.9   CHLORIDE 106 104 103 102 102 102 102   CO2 28 26 26 28 27 30 32   BUN 24* 17 20 20 11 12 13   CREATININE 0.84 0.76 0.70 0.76 0.75 0.85 0.88   GLUCOSE 126* 169* 130* 123* 96 94 91   CALCIUM 8.0* 8.3* 8.9 8.8 9.1 8.8 8.5   PHOS  --  2.8  --  3.5 3.2 3.2 3.0   MG 2.1 1.9  --  1.7 1.8 1.9 1.8     Recent Labs   Lab 11/08/23  0402 11/07/23  1821 11/07/23  0741   WBC 9.5 8.6 8.8   HGB 10.1* 10.7* 11.8*   HCT 30.4* 32.0* 36.0*   * 113* 117*     Recent Labs   Lab 11/07/23  0741 11/03/23  1703   * 29   GPT 38 27   ALKPT 54 55   BILIRUBIN 0.6 0.4        Assessment/Plan     Gunner Mccarty is a 73 year old male with PMHx of HTN, status post stent placement, HLD, PAD status post left femoropopliteal bypass (2020) with subsequent balloon angioplasty (2021) presents with left foot pain, coolness to left foot and numbness since 11/5 at 12:30 pm. Now POD1 LLE angiogram on 11/6 with initiation of LLE catheter directed thrombolytic therapy into occluded L fem-pop gortex bypass. 11/7 0730 stroke alert initiated for confusion with subsequent ICH frontotemporal hemorrhage appreciated on stat CTH and CTA Head. Heparin and tPA discontinued immediatly upon initiation of stroke alert. Patient remains in SICU for continued intensive care. Patient underwent craniotomy with EVD placement on 11/7 for ICH.      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 Campo  - Assess for hematoma formation  - tPA and heparin gtt discontinued  - s/p 2u cryo, additional units with goal fibrinogen > 190  - Vascular checks q1h     Pulmonary:  - intubated and sedated     GI:    #Nausea  #Vomitting  - Zofran PRN  - Diet: NPO  - OGT    Renal:    #ALIS  - Monitor UOP  - mIVF per below     Hematologic:    #Coagulopathy 2/2 tPA and Heparin (discontinued s/p stroke alert)  - CTM  fibrinogen (goal as above) and H/H  - Transfuse if Hb < 7     Endocrine:    #ALIS  - PRN: Accuchecks, ISS     ID:   #ALIS  - Afebrile, no leukocytosis        F: LR @100mL/hr  E: replete per protocol  N: NPO  A: bedrest per above  D: SICU     Prophylaxis:   DVT: held in setting of acute ICH  GI stress: Protonix     Code status: Full    Will discuss with attending, Dr. Campo.    Jalil Crespo MD  Vascular Surgery PGY-1     in a condo with small dog 1 small step to enter. Pt lives on the first floor. +walk in shower with grab bars and chair +grab bars around toilet./alone

## 2023-11-30 ENCOUNTER — INPATIENT (INPATIENT)
Facility: HOSPITAL | Age: 88
LOS: 3 days | Discharge: HOME | DRG: 149 | End: 2023-12-04
Attending: INTERNAL MEDICINE | Admitting: INTERNAL MEDICINE
Payer: MEDICARE

## 2023-11-30 VITALS
SYSTOLIC BLOOD PRESSURE: 215 MMHG | OXYGEN SATURATION: 95 % | HEART RATE: 75 BPM | WEIGHT: 118.61 LBS | TEMPERATURE: 99 F | RESPIRATION RATE: 18 BRPM | DIASTOLIC BLOOD PRESSURE: 84 MMHG

## 2023-11-30 DIAGNOSIS — Z90.10 ACQUIRED ABSENCE OF UNSPECIFIED BREAST AND NIPPLE: Chronic | ICD-10-CM

## 2023-11-30 DIAGNOSIS — R42 DIZZINESS AND GIDDINESS: ICD-10-CM

## 2023-11-30 LAB
ALBUMIN SERPL ELPH-MCNC: 4.5 G/DL — SIGNIFICANT CHANGE UP (ref 3.5–5.2)
ALBUMIN SERPL ELPH-MCNC: 4.5 G/DL — SIGNIFICANT CHANGE UP (ref 3.5–5.2)
ALP SERPL-CCNC: 93 U/L — SIGNIFICANT CHANGE UP (ref 30–115)
ALP SERPL-CCNC: 93 U/L — SIGNIFICANT CHANGE UP (ref 30–115)
ALT FLD-CCNC: 12 U/L — SIGNIFICANT CHANGE UP (ref 0–41)
ALT FLD-CCNC: 12 U/L — SIGNIFICANT CHANGE UP (ref 0–41)
ANION GAP SERPL CALC-SCNC: 14 MMOL/L — SIGNIFICANT CHANGE UP (ref 7–14)
ANION GAP SERPL CALC-SCNC: 14 MMOL/L — SIGNIFICANT CHANGE UP (ref 7–14)
APPEARANCE UR: CLEAR — SIGNIFICANT CHANGE UP
APPEARANCE UR: CLEAR — SIGNIFICANT CHANGE UP
APTT BLD: 33.6 SEC — SIGNIFICANT CHANGE UP (ref 27–39.2)
APTT BLD: 33.6 SEC — SIGNIFICANT CHANGE UP (ref 27–39.2)
AST SERPL-CCNC: 16 U/L — SIGNIFICANT CHANGE UP (ref 0–41)
AST SERPL-CCNC: 16 U/L — SIGNIFICANT CHANGE UP (ref 0–41)
BASOPHILS # BLD AUTO: 0.03 K/UL — SIGNIFICANT CHANGE UP (ref 0–0.2)
BASOPHILS # BLD AUTO: 0.03 K/UL — SIGNIFICANT CHANGE UP (ref 0–0.2)
BASOPHILS NFR BLD AUTO: 0.4 % — SIGNIFICANT CHANGE UP (ref 0–1)
BASOPHILS NFR BLD AUTO: 0.4 % — SIGNIFICANT CHANGE UP (ref 0–1)
BILIRUB SERPL-MCNC: 0.3 MG/DL — SIGNIFICANT CHANGE UP (ref 0.2–1.2)
BILIRUB SERPL-MCNC: 0.3 MG/DL — SIGNIFICANT CHANGE UP (ref 0.2–1.2)
BILIRUB UR-MCNC: NEGATIVE — SIGNIFICANT CHANGE UP
BILIRUB UR-MCNC: NEGATIVE — SIGNIFICANT CHANGE UP
BUN SERPL-MCNC: 15 MG/DL — SIGNIFICANT CHANGE UP (ref 10–20)
BUN SERPL-MCNC: 15 MG/DL — SIGNIFICANT CHANGE UP (ref 10–20)
CALCIUM SERPL-MCNC: 10.2 MG/DL — SIGNIFICANT CHANGE UP (ref 8.4–10.5)
CALCIUM SERPL-MCNC: 10.2 MG/DL — SIGNIFICANT CHANGE UP (ref 8.4–10.5)
CHLORIDE SERPL-SCNC: 102 MMOL/L — SIGNIFICANT CHANGE UP (ref 98–110)
CHLORIDE SERPL-SCNC: 102 MMOL/L — SIGNIFICANT CHANGE UP (ref 98–110)
CO2 SERPL-SCNC: 25 MMOL/L — SIGNIFICANT CHANGE UP (ref 17–32)
CO2 SERPL-SCNC: 25 MMOL/L — SIGNIFICANT CHANGE UP (ref 17–32)
COLOR SPEC: YELLOW — SIGNIFICANT CHANGE UP
COLOR SPEC: YELLOW — SIGNIFICANT CHANGE UP
CREAT SERPL-MCNC: 0.9 MG/DL — SIGNIFICANT CHANGE UP (ref 0.7–1.5)
CREAT SERPL-MCNC: 0.9 MG/DL — SIGNIFICANT CHANGE UP (ref 0.7–1.5)
DIFF PNL FLD: NEGATIVE — SIGNIFICANT CHANGE UP
DIFF PNL FLD: NEGATIVE — SIGNIFICANT CHANGE UP
EGFR: 61 ML/MIN/1.73M2 — SIGNIFICANT CHANGE UP
EGFR: 61 ML/MIN/1.73M2 — SIGNIFICANT CHANGE UP
EOSINOPHIL # BLD AUTO: 0.02 K/UL — SIGNIFICANT CHANGE UP (ref 0–0.7)
EOSINOPHIL # BLD AUTO: 0.02 K/UL — SIGNIFICANT CHANGE UP (ref 0–0.7)
EOSINOPHIL NFR BLD AUTO: 0.2 % — SIGNIFICANT CHANGE UP (ref 0–8)
EOSINOPHIL NFR BLD AUTO: 0.2 % — SIGNIFICANT CHANGE UP (ref 0–8)
FLUAV AG NPH QL: SIGNIFICANT CHANGE UP
FLUAV AG NPH QL: SIGNIFICANT CHANGE UP
FLUBV AG NPH QL: SIGNIFICANT CHANGE UP
FLUBV AG NPH QL: SIGNIFICANT CHANGE UP
GLUCOSE BLDC GLUCOMTR-MCNC: 171 MG/DL — HIGH (ref 70–99)
GLUCOSE BLDC GLUCOMTR-MCNC: 171 MG/DL — HIGH (ref 70–99)
GLUCOSE BLDC GLUCOMTR-MCNC: 69 MG/DL — LOW (ref 70–99)
GLUCOSE BLDC GLUCOMTR-MCNC: 69 MG/DL — LOW (ref 70–99)
GLUCOSE SERPL-MCNC: 141 MG/DL — HIGH (ref 70–99)
GLUCOSE SERPL-MCNC: 141 MG/DL — HIGH (ref 70–99)
GLUCOSE UR QL: NEGATIVE MG/DL — SIGNIFICANT CHANGE UP
GLUCOSE UR QL: NEGATIVE MG/DL — SIGNIFICANT CHANGE UP
HCT VFR BLD CALC: 35.7 % — LOW (ref 37–47)
HCT VFR BLD CALC: 35.7 % — LOW (ref 37–47)
HGB BLD-MCNC: 12.1 G/DL — SIGNIFICANT CHANGE UP (ref 12–16)
HGB BLD-MCNC: 12.1 G/DL — SIGNIFICANT CHANGE UP (ref 12–16)
IMM GRANULOCYTES NFR BLD AUTO: 0.4 % — HIGH (ref 0.1–0.3)
IMM GRANULOCYTES NFR BLD AUTO: 0.4 % — HIGH (ref 0.1–0.3)
INR BLD: 1.03 RATIO — SIGNIFICANT CHANGE UP (ref 0.65–1.3)
INR BLD: 1.03 RATIO — SIGNIFICANT CHANGE UP (ref 0.65–1.3)
KETONES UR-MCNC: NEGATIVE MG/DL — SIGNIFICANT CHANGE UP
KETONES UR-MCNC: NEGATIVE MG/DL — SIGNIFICANT CHANGE UP
LEUKOCYTE ESTERASE UR-ACNC: NEGATIVE — SIGNIFICANT CHANGE UP
LEUKOCYTE ESTERASE UR-ACNC: NEGATIVE — SIGNIFICANT CHANGE UP
LYMPHOCYTES # BLD AUTO: 0.71 K/UL — LOW (ref 1.2–3.4)
LYMPHOCYTES # BLD AUTO: 0.71 K/UL — LOW (ref 1.2–3.4)
LYMPHOCYTES # BLD AUTO: 8.6 % — LOW (ref 20.5–51.1)
LYMPHOCYTES # BLD AUTO: 8.6 % — LOW (ref 20.5–51.1)
MCHC RBC-ENTMCNC: 28 PG — SIGNIFICANT CHANGE UP (ref 27–31)
MCHC RBC-ENTMCNC: 28 PG — SIGNIFICANT CHANGE UP (ref 27–31)
MCHC RBC-ENTMCNC: 33.9 G/DL — SIGNIFICANT CHANGE UP (ref 32–37)
MCHC RBC-ENTMCNC: 33.9 G/DL — SIGNIFICANT CHANGE UP (ref 32–37)
MCV RBC AUTO: 82.6 FL — SIGNIFICANT CHANGE UP (ref 81–99)
MCV RBC AUTO: 82.6 FL — SIGNIFICANT CHANGE UP (ref 81–99)
MONOCYTES # BLD AUTO: 0.37 K/UL — SIGNIFICANT CHANGE UP (ref 0.1–0.6)
MONOCYTES # BLD AUTO: 0.37 K/UL — SIGNIFICANT CHANGE UP (ref 0.1–0.6)
MONOCYTES NFR BLD AUTO: 4.5 % — SIGNIFICANT CHANGE UP (ref 1.7–9.3)
MONOCYTES NFR BLD AUTO: 4.5 % — SIGNIFICANT CHANGE UP (ref 1.7–9.3)
NEUTROPHILS # BLD AUTO: 7.13 K/UL — HIGH (ref 1.4–6.5)
NEUTROPHILS # BLD AUTO: 7.13 K/UL — HIGH (ref 1.4–6.5)
NEUTROPHILS NFR BLD AUTO: 85.9 % — HIGH (ref 42.2–75.2)
NEUTROPHILS NFR BLD AUTO: 85.9 % — HIGH (ref 42.2–75.2)
NITRITE UR-MCNC: NEGATIVE — SIGNIFICANT CHANGE UP
NITRITE UR-MCNC: NEGATIVE — SIGNIFICANT CHANGE UP
NRBC # BLD: 0 /100 WBCS — SIGNIFICANT CHANGE UP (ref 0–0)
NRBC # BLD: 0 /100 WBCS — SIGNIFICANT CHANGE UP (ref 0–0)
PH UR: 6 — SIGNIFICANT CHANGE UP (ref 5–8)
PH UR: 6 — SIGNIFICANT CHANGE UP (ref 5–8)
PLATELET # BLD AUTO: 191 K/UL — SIGNIFICANT CHANGE UP (ref 130–400)
PLATELET # BLD AUTO: 191 K/UL — SIGNIFICANT CHANGE UP (ref 130–400)
PMV BLD: 8.4 FL — SIGNIFICANT CHANGE UP (ref 7.4–10.4)
PMV BLD: 8.4 FL — SIGNIFICANT CHANGE UP (ref 7.4–10.4)
POTASSIUM SERPL-MCNC: 4.1 MMOL/L — SIGNIFICANT CHANGE UP (ref 3.5–5)
POTASSIUM SERPL-MCNC: 4.1 MMOL/L — SIGNIFICANT CHANGE UP (ref 3.5–5)
POTASSIUM SERPL-SCNC: 4.1 MMOL/L — SIGNIFICANT CHANGE UP (ref 3.5–5)
POTASSIUM SERPL-SCNC: 4.1 MMOL/L — SIGNIFICANT CHANGE UP (ref 3.5–5)
PROT SERPL-MCNC: 6.7 G/DL — SIGNIFICANT CHANGE UP (ref 6–8)
PROT SERPL-MCNC: 6.7 G/DL — SIGNIFICANT CHANGE UP (ref 6–8)
PROT UR-MCNC: SIGNIFICANT CHANGE UP MG/DL
PROT UR-MCNC: SIGNIFICANT CHANGE UP MG/DL
PROTHROM AB SERPL-ACNC: 11.7 SEC — SIGNIFICANT CHANGE UP (ref 9.95–12.87)
PROTHROM AB SERPL-ACNC: 11.7 SEC — SIGNIFICANT CHANGE UP (ref 9.95–12.87)
RBC # BLD: 4.32 M/UL — SIGNIFICANT CHANGE UP (ref 4.2–5.4)
RBC # BLD: 4.32 M/UL — SIGNIFICANT CHANGE UP (ref 4.2–5.4)
RBC # FLD: 13.4 % — SIGNIFICANT CHANGE UP (ref 11.5–14.5)
RBC # FLD: 13.4 % — SIGNIFICANT CHANGE UP (ref 11.5–14.5)
RSV RNA NPH QL NAA+NON-PROBE: SIGNIFICANT CHANGE UP
RSV RNA NPH QL NAA+NON-PROBE: SIGNIFICANT CHANGE UP
SARS-COV-2 RNA SPEC QL NAA+PROBE: SIGNIFICANT CHANGE UP
SARS-COV-2 RNA SPEC QL NAA+PROBE: SIGNIFICANT CHANGE UP
SODIUM SERPL-SCNC: 141 MMOL/L — SIGNIFICANT CHANGE UP (ref 135–146)
SODIUM SERPL-SCNC: 141 MMOL/L — SIGNIFICANT CHANGE UP (ref 135–146)
SP GR SPEC: >1.03 — HIGH (ref 1–1.03)
SP GR SPEC: >1.03 — HIGH (ref 1–1.03)
TROPONIN T SERPL-MCNC: <0.01 NG/ML — SIGNIFICANT CHANGE UP
TROPONIN T SERPL-MCNC: <0.01 NG/ML — SIGNIFICANT CHANGE UP
UROBILINOGEN FLD QL: 0.2 MG/DL — SIGNIFICANT CHANGE UP (ref 0.2–1)
UROBILINOGEN FLD QL: 0.2 MG/DL — SIGNIFICANT CHANGE UP (ref 0.2–1)
WBC # BLD: 8.29 K/UL — SIGNIFICANT CHANGE UP (ref 4.8–10.8)
WBC # BLD: 8.29 K/UL — SIGNIFICANT CHANGE UP (ref 4.8–10.8)
WBC # FLD AUTO: 8.29 K/UL — SIGNIFICANT CHANGE UP (ref 4.8–10.8)
WBC # FLD AUTO: 8.29 K/UL — SIGNIFICANT CHANGE UP (ref 4.8–10.8)

## 2023-11-30 PROCEDURE — 80053 COMPREHEN METABOLIC PANEL: CPT

## 2023-11-30 PROCEDURE — 83036 HEMOGLOBIN GLYCOSYLATED A1C: CPT

## 2023-11-30 PROCEDURE — 70551 MRI BRAIN STEM W/O DYE: CPT | Mod: MG

## 2023-11-30 PROCEDURE — 99285 EMERGENCY DEPT VISIT HI MDM: CPT

## 2023-11-30 PROCEDURE — 92610 EVALUATE SWALLOWING FUNCTION: CPT | Mod: GN

## 2023-11-30 PROCEDURE — 85027 COMPLETE CBC AUTOMATED: CPT

## 2023-11-30 PROCEDURE — 97110 THERAPEUTIC EXERCISES: CPT | Mod: GP

## 2023-11-30 PROCEDURE — 71045 X-RAY EXAM CHEST 1 VIEW: CPT | Mod: 26

## 2023-11-30 PROCEDURE — 80061 LIPID PANEL: CPT

## 2023-11-30 PROCEDURE — 97162 PT EVAL MOD COMPLEX 30 MIN: CPT | Mod: GP

## 2023-11-30 PROCEDURE — 70496 CT ANGIOGRAPHY HEAD: CPT | Mod: 26,MA

## 2023-11-30 PROCEDURE — 83735 ASSAY OF MAGNESIUM: CPT

## 2023-11-30 PROCEDURE — 70498 CT ANGIOGRAPHY NECK: CPT | Mod: 26,MA

## 2023-11-30 PROCEDURE — 93010 ELECTROCARDIOGRAM REPORT: CPT

## 2023-11-30 PROCEDURE — G1004: CPT

## 2023-11-30 PROCEDURE — 36415 COLL VENOUS BLD VENIPUNCTURE: CPT

## 2023-11-30 PROCEDURE — 0042T: CPT | Mod: MA

## 2023-11-30 PROCEDURE — 70450 CT HEAD/BRAIN W/O DYE: CPT | Mod: 26,MA,XU,59

## 2023-11-30 PROCEDURE — 82962 GLUCOSE BLOOD TEST: CPT

## 2023-11-30 PROCEDURE — 97116 GAIT TRAINING THERAPY: CPT | Mod: GP

## 2023-11-30 PROCEDURE — 85025 COMPLETE CBC W/AUTO DIFF WBC: CPT

## 2023-11-30 PROCEDURE — 99223 1ST HOSP IP/OBS HIGH 75: CPT

## 2023-11-30 PROCEDURE — 97165 OT EVAL LOW COMPLEX 30 MIN: CPT | Mod: GO

## 2023-11-30 RX ORDER — DEXTROSE 50 % IN WATER 50 %
12.5 SYRINGE (ML) INTRAVENOUS ONCE
Refills: 0 | Status: DISCONTINUED | OUTPATIENT
Start: 2023-11-30 | End: 2023-12-04

## 2023-11-30 RX ORDER — ATORVASTATIN CALCIUM 80 MG/1
80 TABLET, FILM COATED ORAL AT BEDTIME
Refills: 0 | Status: DISCONTINUED | OUTPATIENT
Start: 2023-11-30 | End: 2023-12-04

## 2023-11-30 RX ORDER — SENNA PLUS 8.6 MG/1
2 TABLET ORAL AT BEDTIME
Refills: 0 | Status: DISCONTINUED | OUTPATIENT
Start: 2023-11-30 | End: 2023-12-04

## 2023-11-30 RX ORDER — MECLIZINE HCL 12.5 MG
25 TABLET ORAL THREE TIMES A DAY
Refills: 0 | Status: DISCONTINUED | OUTPATIENT
Start: 2023-11-30 | End: 2023-12-04

## 2023-11-30 RX ORDER — ASPIRIN/CALCIUM CARB/MAGNESIUM 324 MG
325 TABLET ORAL ONCE
Refills: 0 | Status: COMPLETED | OUTPATIENT
Start: 2023-11-30 | End: 2023-11-30

## 2023-11-30 RX ORDER — LISINOPRIL 2.5 MG/1
10 TABLET ORAL DAILY
Refills: 0 | Status: DISCONTINUED | OUTPATIENT
Start: 2023-11-30 | End: 2023-12-04

## 2023-11-30 RX ORDER — CLOPIDOGREL BISULFATE 75 MG/1
75 TABLET, FILM COATED ORAL ONCE
Refills: 0 | Status: COMPLETED | OUTPATIENT
Start: 2023-11-30 | End: 2023-11-30

## 2023-11-30 RX ORDER — CLOPIDOGREL BISULFATE 75 MG/1
75 TABLET, FILM COATED ORAL DAILY
Refills: 0 | Status: DISCONTINUED | OUTPATIENT
Start: 2023-11-30 | End: 2023-12-04

## 2023-11-30 RX ORDER — SODIUM CHLORIDE 9 MG/ML
1000 INJECTION, SOLUTION INTRAVENOUS
Refills: 0 | Status: DISCONTINUED | OUTPATIENT
Start: 2023-11-30 | End: 2023-12-04

## 2023-11-30 RX ORDER — ATORVASTATIN CALCIUM 80 MG/1
1 TABLET, FILM COATED ORAL
Refills: 0 | DISCHARGE

## 2023-11-30 RX ORDER — DEXTROSE 50 % IN WATER 50 %
15 SYRINGE (ML) INTRAVENOUS ONCE
Refills: 0 | Status: DISCONTINUED | OUTPATIENT
Start: 2023-11-30 | End: 2023-12-04

## 2023-11-30 RX ORDER — METFORMIN HYDROCHLORIDE 850 MG/1
1 TABLET ORAL
Refills: 0 | DISCHARGE

## 2023-11-30 RX ORDER — INSULIN LISPRO 100/ML
VIAL (ML) SUBCUTANEOUS
Refills: 0 | Status: DISCONTINUED | OUTPATIENT
Start: 2023-11-30 | End: 2023-12-04

## 2023-11-30 RX ORDER — GLUCAGON INJECTION, SOLUTION 0.5 MG/.1ML
1 INJECTION, SOLUTION SUBCUTANEOUS ONCE
Refills: 0 | Status: DISCONTINUED | OUTPATIENT
Start: 2023-11-30 | End: 2023-12-04

## 2023-11-30 RX ORDER — DEXTROSE 50 % IN WATER 50 %
25 SYRINGE (ML) INTRAVENOUS ONCE
Refills: 0 | Status: DISCONTINUED | OUTPATIENT
Start: 2023-11-30 | End: 2023-12-04

## 2023-11-30 RX ORDER — INFLUENZA VIRUS VACCINE 15; 15; 15; 15 UG/.5ML; UG/.5ML; UG/.5ML; UG/.5ML
0.7 SUSPENSION INTRAMUSCULAR ONCE
Refills: 0 | Status: DISCONTINUED | OUTPATIENT
Start: 2023-11-30 | End: 2023-12-04

## 2023-11-30 RX ORDER — QUINAPRIL HYDROCHLORIDE 40 MG/1
1 TABLET, FILM COATED ORAL
Refills: 0 | DISCHARGE

## 2023-11-30 RX ORDER — ENOXAPARIN SODIUM 100 MG/ML
40 INJECTION SUBCUTANEOUS EVERY 24 HOURS
Refills: 0 | Status: DISCONTINUED | OUTPATIENT
Start: 2023-11-30 | End: 2023-12-04

## 2023-11-30 RX ORDER — GABAPENTIN 400 MG/1
1 CAPSULE ORAL
Refills: 0 | DISCHARGE

## 2023-11-30 RX ORDER — GABAPENTIN 400 MG/1
100 CAPSULE ORAL THREE TIMES A DAY
Refills: 0 | Status: DISCONTINUED | OUTPATIENT
Start: 2023-11-30 | End: 2023-12-04

## 2023-11-30 RX ORDER — ATORVASTATIN CALCIUM 80 MG/1
1 TABLET, FILM COATED ORAL
Qty: 0 | Refills: 0 | DISCHARGE

## 2023-11-30 RX ORDER — ASPIRIN/CALCIUM CARB/MAGNESIUM 324 MG
81 TABLET ORAL DAILY
Refills: 0 | Status: DISCONTINUED | OUTPATIENT
Start: 2023-11-30 | End: 2023-12-04

## 2023-11-30 RX ORDER — HYDRALAZINE HCL 50 MG
10 TABLET ORAL EVERY 6 HOURS
Refills: 0 | Status: DISCONTINUED | OUTPATIENT
Start: 2023-11-30 | End: 2023-12-04

## 2023-11-30 RX ORDER — POLYETHYLENE GLYCOL 3350 17 G/17G
17 POWDER, FOR SOLUTION ORAL DAILY
Refills: 0 | Status: DISCONTINUED | OUTPATIENT
Start: 2023-11-30 | End: 2023-12-04

## 2023-11-30 RX ORDER — CHLORHEXIDINE GLUCONATE 213 G/1000ML
1 SOLUTION TOPICAL
Refills: 0 | Status: DISCONTINUED | OUTPATIENT
Start: 2023-11-30 | End: 2023-12-04

## 2023-11-30 RX ORDER — GLIMEPIRIDE 1 MG
1 TABLET ORAL
Refills: 0 | DISCHARGE

## 2023-11-30 RX ADMIN — LISINOPRIL 10 MILLIGRAM(S): 2.5 TABLET ORAL at 17:26

## 2023-11-30 RX ADMIN — CHLORHEXIDINE GLUCONATE 1 APPLICATION(S): 213 SOLUTION TOPICAL at 17:26

## 2023-11-30 RX ADMIN — GABAPENTIN 100 MILLIGRAM(S): 400 CAPSULE ORAL at 21:16

## 2023-11-30 RX ADMIN — ATORVASTATIN CALCIUM 80 MILLIGRAM(S): 80 TABLET, FILM COATED ORAL at 15:02

## 2023-11-30 RX ADMIN — ENOXAPARIN SODIUM 40 MILLIGRAM(S): 100 INJECTION SUBCUTANEOUS at 17:26

## 2023-11-30 RX ADMIN — SENNA PLUS 2 TABLET(S): 8.6 TABLET ORAL at 21:16

## 2023-11-30 RX ADMIN — Medication 325 MILLIGRAM(S): at 15:02

## 2023-11-30 RX ADMIN — CLOPIDOGREL BISULFATE 75 MILLIGRAM(S): 75 TABLET, FILM COATED ORAL at 15:02

## 2023-11-30 NOTE — ED ADULT NURSE NOTE - NSFALLHARMRISKINTERV_ED_ALL_ED

## 2023-11-30 NOTE — H&P ADULT - NSHPPHYSICALEXAM_GEN_ALL_CORE
Vital Signs (24 Hrs):  T(C): 36.6 (11-30-23 @ 14:20), Max: 37.3 (11-30-23 @ 12:20)  HR: 76 (11-30-23 @ 14:20) (75 - 101)  BP: 145/72 (11-30-23 @ 14:20) (139/72 - 215/84)  RR: 16 (11-30-23 @ 14:20) (16 - 18)  SpO2: 95% (11-30-23 @ 14:20) (95% - 98%)    PHYSICAL EXAM:  GENERAL: NAD, well-developed  SKIN: No rashes or lesions  HEAD:  Atraumatic, Normocephalic  EYES: EOMI, PERRLA, conjunctiva and sclera clear  NECK: Supple, No JVD  CHEST/LUNG: Clear to auscultation bilaterally; No wheeze  HEART: Regular rate and rhythm; No murmurs, rubs, or gallops  ABDOMEN: Soft, Nontender, Nondistended; Bowel sounds present  EXTREMITIES:  No clubbing, cyanosis, or edema  CNS: AAOx3; Str 5/5 x 4; no facial asymmetry

## 2023-11-30 NOTE — ED ADULT TRIAGE NOTE - CHIEF COMPLAINT QUOTE
BIBA as per patient she feels dizzy and off balance and feels numbess in her hands. Time unkown patient states "yesterday"

## 2023-11-30 NOTE — H&P ADULT - HISTORY OF PRESENT ILLNESS
88F w/PMHx of HTN, HLD, DM, CVA with residual L sided weakness, breast CA s/p mastectomy, CAD on Plavix and ASA presents to ED with complaints of LUE weakness and vertigo since this AM.  Patient reports chronic vertigo and weakness and paresthesias to not only the LUE but the RUE as well.  She reports the LUE slightly worsened today and she dropped a cup of coffee which caused her concern and to come for evaluation  Patient also reports chronic vertigo since she was a child, exacerbates and remits  Reports its worth with head movement and position change.  Currently on Meclizine PRN.  No CP/SOB.  No abdominal or urinary complaints.  Patient with similar symptoms and admission 8/23 with negative workup.  Of note, patient hypertensive in ED, reports does not take BP meds.  No complaints of HA, CP, changes in vision.

## 2023-11-30 NOTE — ED PROVIDER NOTE - PHYSICAL EXAMINATION
Vital Signs: I have reviewed the initial vital signs.  Constitutional: well-nourished, no acute distress, normocephalic  Eyes: PERRLA, EOMI,  clear conjunctiva  ENT: MMM, no facial droop  Cardiovascular: regular rate, regular rhythm, no murmur appreciated  Respiratory: unlabored respiratory effort, clear to auscultation bilaterally  Gastrointestinal: soft, non-tender, non-distended  abdomen, no pulsatile mass  Musculoskeletal: supple neck, no lower extremity edema, no bony tenderness  Integumentary: warm, dry, no rash  Neurologic: awake, alert, cranial nerves II-XII grossly intact, extremities’ motor and sensory functions grossly intact, +left upper extremity pronator drift

## 2023-11-30 NOTE — PATIENT PROFILE ADULT - NSPROSPHOSPCHAPLAINYN_GEN_A_NUR
Mild HTN
SOB (shortness of breath)
Mild HTN
Mild HTN
SOB (shortness of breath)
SOB (shortness of breath)
no

## 2023-11-30 NOTE — PATIENT PROFILE ADULT - FALL HARM RISK - HARM RISK INTERVENTIONS
Assistance with ambulation/Assistance OOB with selected safe patient handling equipment/Communicate Risk of Fall with Harm to all staff/Monitor gait and stability/Reinforce activity limits and safety measures with patient and family/Sit up slowly, dangle for a short time, stand at bedside before walking/Tailored Fall Risk Interventions/Visual Cue: Yellow wristband and red socks/Bed in lowest position, wheels locked, appropriate side rails in place/Call bell, personal items and telephone in reach/Instruct patient to call for assistance before getting out of bed or chair/Non-slip footwear when patient is out of bed/[place] to call system/Physically safe environment - no spills, clutter or unnecessary equipment/Purposeful Proactive Rounding/Room/bathroom lighting operational, light cord in reach

## 2023-11-30 NOTE — ED PROVIDER NOTE - CLINICAL SUMMARY MEDICAL DECISION MAKING FREE TEXT BOX
88yF p/w dizziness since yesterday.  NIHSS 1 for ataxia in LUE and pt out of window for thrombolytics.  CTH and CTA/CTP reassuring.  Pt's initial marked hypertension improved w/o intervention.  Labs reassuring.  D/w telestroke - recommend admission for SDU/q4hr neuro checks, aspirin, plavix, statin and MRI.

## 2023-11-30 NOTE — ED PROVIDER NOTE - PROGRESS NOTE DETAILS
EK - spoke to neuro telestroke Dr. Pruett - ok to adm SDU/q4hr neuro checks until MRI tomorrow, aspirin, plavix, atorvastatin

## 2023-11-30 NOTE — H&P ADULT - ASSESSMENT
88F w/PMHx of HTN, HLD, DM, CVA with residual L sided weakness, breast CA s/p mastectomy, CAD on Plavix and ASA presents to ED with complaints of LUE weakness and vertigo since this AM, admitted to SDU for further management.    #Vertigo with acute on chronic LUE weakness  #Hx of CVA  - admit to SDU  - Neuro checks q4h  - c/w ASA/Plavix/Statin, increase Atorvastatin 80mg QHS per Neuro  - Neuro FU, MRI Brain  - ECHO w/bubble study 8/23 reviewed  - Lipids/A1C  in am  - PT/OT/SLP/PMR eval     #HTN Urgency  - was discharged on 5mg quinapril in August, not taking at home  - Permissive HTN approx 160 goal, will start Lisinopril 10mg PO QD with 1st dose and monitor  - Hydralaizine 10mg IVP q6h PRN with goal Bp of 160  - DASH Diet when cleared by SLP    #DM2  - Hold PO meds  - FS AC TID w/ISS, consider basal/bolus coverage if persistently hyperglycemic  - CHO diet    #Hx of CAD  - c/w ASA/Plavix/Statin    #Hx of Breast Ca s/p right mastectomy  - outpatient Onc FU         Yes

## 2023-11-30 NOTE — H&P ADULT - NSHPLABSRESULTS_GEN_ALL_CORE
12.1   8.29  )-----------( 191      ( 30 Nov 2023 13:00 )             35.7       11-30    141  |  102  |  15  ----------------------------<  141<H>  4.1   |  25  |  0.9    Ca    10.2      30 Nov 2023 13:00    TPro  6.7  /  Alb  4.5  /  TBili  0.3  /  DBili  x   /  AST  16  /  ALT  12  /  AlkPhos  93  11-30          Urinalysis Basic - ( 30 Nov 2023 14:10 )    Color: Yellow / Appearance: Clear / SG: >1.030 / pH: x  Gluc: x / Ketone: Negative mg/dL  / Bili: Negative / Urobili: 0.2 mg/dL   Blood: x / Protein: Trace mg/dL / Nitrite: Negative   Leuk Esterase: Negative / RBC: x / WBC x   Sq Epi: x / Non Sq Epi: x / Bacteria: x        PT/INR - ( 30 Nov 2023 13:00 )   PT: 11.70 sec;   INR: 1.03 ratio         PTT - ( 30 Nov 2023 13:00 )  PTT:33.6 sec    Lactate Trend      CARDIAC MARKERS ( 30 Nov 2023 13:00 )  x     / <0.01 ng/mL / x     / x     / x          CTA H/N; CTA Brain Stroke Protocol; CT Brain Perfusion    IMPRESSION:    1.  No evidence of acute large vessel occlusion. Normal perfusion images.    2.  Mild atheromatous changes, stable since the previous study dated   7/28/2023.    CTH    IMPRESSION:    1.  No evidence of acute intracranial hemorrhage or large territory   infarct. Stable exam since 7/28/2023.    2.  Stable chronic microvascular changes and multiple chronic infarcts.

## 2023-11-30 NOTE — ED PROVIDER NOTE - ATTENDING APP SHARED VISIT CONTRIBUTION OF CARE
88yF DM p/w dizziness, generalized weakness since last night.  EMS noted  and pt stroke code on arrival for AMS and dizziness.

## 2023-11-30 NOTE — ED PROVIDER NOTE - OBJECTIVE STATEMENT
87 y/o female with hx of NIDDM, HTN, breast cancer with mastectomy presents to the ED with dizziness and left upper extremity tingling and weakness intermittent since yesterday . patient c/o mild headache. no visual changes, tinnitus. no falls or head injury. pt denies any vomiting or diarrhea. no fevers. no dysuria or gross hematuria. no difficulty speaking or slurred speech

## 2023-11-30 NOTE — H&P ADULT - NS ATTEND AMEND GEN_ALL_CORE FT
88 year old F with a medical history of  HTN, HLD, DM, CVA with residual L sided weakness, breast CA s/p mastectomy and  CAD presents to ED with complaints of a severe episode of dizziness associated with dropping her coffee and toast this morning. Pt reports this is similar to many episodes that have occurred in past.     dizziness r/o stroke     - admit to SDU as per neurology recommendations   - aspirin, Plavix and Lipitor   - MRI brain   - PT/OT/SLP   - home meds   - DVT prophylaxis

## 2023-12-01 LAB
A1C WITH ESTIMATED AVERAGE GLUCOSE RESULT: 7.6 % — HIGH (ref 4–5.6)
A1C WITH ESTIMATED AVERAGE GLUCOSE RESULT: 7.6 % — HIGH (ref 4–5.6)
A1C WITH ESTIMATED AVERAGE GLUCOSE RESULT: 7.9 % — HIGH (ref 4–5.6)
A1C WITH ESTIMATED AVERAGE GLUCOSE RESULT: 7.9 % — HIGH (ref 4–5.6)
ALBUMIN SERPL ELPH-MCNC: 3.7 G/DL — SIGNIFICANT CHANGE UP (ref 3.5–5.2)
ALBUMIN SERPL ELPH-MCNC: 3.7 G/DL — SIGNIFICANT CHANGE UP (ref 3.5–5.2)
ALP SERPL-CCNC: 75 U/L — SIGNIFICANT CHANGE UP (ref 30–115)
ALP SERPL-CCNC: 75 U/L — SIGNIFICANT CHANGE UP (ref 30–115)
ALT FLD-CCNC: 10 U/L — SIGNIFICANT CHANGE UP (ref 0–41)
ALT FLD-CCNC: 10 U/L — SIGNIFICANT CHANGE UP (ref 0–41)
ANION GAP SERPL CALC-SCNC: 10 MMOL/L — SIGNIFICANT CHANGE UP (ref 7–14)
ANION GAP SERPL CALC-SCNC: 10 MMOL/L — SIGNIFICANT CHANGE UP (ref 7–14)
AST SERPL-CCNC: 14 U/L — SIGNIFICANT CHANGE UP (ref 0–41)
AST SERPL-CCNC: 14 U/L — SIGNIFICANT CHANGE UP (ref 0–41)
BILIRUB SERPL-MCNC: <0.2 MG/DL — SIGNIFICANT CHANGE UP (ref 0.2–1.2)
BILIRUB SERPL-MCNC: <0.2 MG/DL — SIGNIFICANT CHANGE UP (ref 0.2–1.2)
BUN SERPL-MCNC: 15 MG/DL — SIGNIFICANT CHANGE UP (ref 10–20)
BUN SERPL-MCNC: 15 MG/DL — SIGNIFICANT CHANGE UP (ref 10–20)
CALCIUM SERPL-MCNC: 9.5 MG/DL — SIGNIFICANT CHANGE UP (ref 8.4–10.5)
CALCIUM SERPL-MCNC: 9.5 MG/DL — SIGNIFICANT CHANGE UP (ref 8.4–10.5)
CHLORIDE SERPL-SCNC: 104 MMOL/L — SIGNIFICANT CHANGE UP (ref 98–110)
CHLORIDE SERPL-SCNC: 104 MMOL/L — SIGNIFICANT CHANGE UP (ref 98–110)
CHOLEST SERPL-MCNC: 177 MG/DL — SIGNIFICANT CHANGE UP
CHOLEST SERPL-MCNC: 177 MG/DL — SIGNIFICANT CHANGE UP
CO2 SERPL-SCNC: 27 MMOL/L — SIGNIFICANT CHANGE UP (ref 17–32)
CO2 SERPL-SCNC: 27 MMOL/L — SIGNIFICANT CHANGE UP (ref 17–32)
CREAT SERPL-MCNC: 1 MG/DL — SIGNIFICANT CHANGE UP (ref 0.7–1.5)
CREAT SERPL-MCNC: 1 MG/DL — SIGNIFICANT CHANGE UP (ref 0.7–1.5)
EGFR: 54 ML/MIN/1.73M2 — LOW
EGFR: 54 ML/MIN/1.73M2 — LOW
ESTIMATED AVERAGE GLUCOSE: 171 MG/DL — HIGH (ref 68–114)
ESTIMATED AVERAGE GLUCOSE: 171 MG/DL — HIGH (ref 68–114)
ESTIMATED AVERAGE GLUCOSE: 180 MG/DL — HIGH (ref 68–114)
ESTIMATED AVERAGE GLUCOSE: 180 MG/DL — HIGH (ref 68–114)
GLUCOSE BLDC GLUCOMTR-MCNC: 174 MG/DL — HIGH (ref 70–99)
GLUCOSE BLDC GLUCOMTR-MCNC: 174 MG/DL — HIGH (ref 70–99)
GLUCOSE BLDC GLUCOMTR-MCNC: 193 MG/DL — HIGH (ref 70–99)
GLUCOSE BLDC GLUCOMTR-MCNC: 193 MG/DL — HIGH (ref 70–99)
GLUCOSE BLDC GLUCOMTR-MCNC: 206 MG/DL — HIGH (ref 70–99)
GLUCOSE BLDC GLUCOMTR-MCNC: 206 MG/DL — HIGH (ref 70–99)
GLUCOSE BLDC GLUCOMTR-MCNC: 220 MG/DL — HIGH (ref 70–99)
GLUCOSE BLDC GLUCOMTR-MCNC: 220 MG/DL — HIGH (ref 70–99)
GLUCOSE SERPL-MCNC: 202 MG/DL — HIGH (ref 70–99)
GLUCOSE SERPL-MCNC: 202 MG/DL — HIGH (ref 70–99)
HCT VFR BLD CALC: 33.1 % — LOW (ref 37–47)
HCT VFR BLD CALC: 33.1 % — LOW (ref 37–47)
HDLC SERPL-MCNC: 56 MG/DL — SIGNIFICANT CHANGE UP
HDLC SERPL-MCNC: 56 MG/DL — SIGNIFICANT CHANGE UP
HGB BLD-MCNC: 10.8 G/DL — LOW (ref 12–16)
HGB BLD-MCNC: 10.8 G/DL — LOW (ref 12–16)
LIPID PNL WITH DIRECT LDL SERPL: 96 MG/DL — SIGNIFICANT CHANGE UP
LIPID PNL WITH DIRECT LDL SERPL: 96 MG/DL — SIGNIFICANT CHANGE UP
MCHC RBC-ENTMCNC: 27.8 PG — SIGNIFICANT CHANGE UP (ref 27–31)
MCHC RBC-ENTMCNC: 27.8 PG — SIGNIFICANT CHANGE UP (ref 27–31)
MCHC RBC-ENTMCNC: 32.6 G/DL — SIGNIFICANT CHANGE UP (ref 32–37)
MCHC RBC-ENTMCNC: 32.6 G/DL — SIGNIFICANT CHANGE UP (ref 32–37)
MCV RBC AUTO: 85.3 FL — SIGNIFICANT CHANGE UP (ref 81–99)
MCV RBC AUTO: 85.3 FL — SIGNIFICANT CHANGE UP (ref 81–99)
NON HDL CHOLESTEROL: 121 MG/DL — SIGNIFICANT CHANGE UP
NON HDL CHOLESTEROL: 121 MG/DL — SIGNIFICANT CHANGE UP
NRBC # BLD: 0 /100 WBCS — SIGNIFICANT CHANGE UP (ref 0–0)
NRBC # BLD: 0 /100 WBCS — SIGNIFICANT CHANGE UP (ref 0–0)
PLATELET # BLD AUTO: 174 K/UL — SIGNIFICANT CHANGE UP (ref 130–400)
PLATELET # BLD AUTO: 174 K/UL — SIGNIFICANT CHANGE UP (ref 130–400)
PMV BLD: 8.5 FL — SIGNIFICANT CHANGE UP (ref 7.4–10.4)
PMV BLD: 8.5 FL — SIGNIFICANT CHANGE UP (ref 7.4–10.4)
POTASSIUM SERPL-MCNC: 4.5 MMOL/L — SIGNIFICANT CHANGE UP (ref 3.5–5)
POTASSIUM SERPL-MCNC: 4.5 MMOL/L — SIGNIFICANT CHANGE UP (ref 3.5–5)
POTASSIUM SERPL-SCNC: 4.5 MMOL/L — SIGNIFICANT CHANGE UP (ref 3.5–5)
POTASSIUM SERPL-SCNC: 4.5 MMOL/L — SIGNIFICANT CHANGE UP (ref 3.5–5)
PROT SERPL-MCNC: 5.6 G/DL — LOW (ref 6–8)
PROT SERPL-MCNC: 5.6 G/DL — LOW (ref 6–8)
RBC # BLD: 3.88 M/UL — LOW (ref 4.2–5.4)
RBC # BLD: 3.88 M/UL — LOW (ref 4.2–5.4)
RBC # FLD: 13.5 % — SIGNIFICANT CHANGE UP (ref 11.5–14.5)
RBC # FLD: 13.5 % — SIGNIFICANT CHANGE UP (ref 11.5–14.5)
SODIUM SERPL-SCNC: 141 MMOL/L — SIGNIFICANT CHANGE UP (ref 135–146)
SODIUM SERPL-SCNC: 141 MMOL/L — SIGNIFICANT CHANGE UP (ref 135–146)
TRIGL SERPL-MCNC: 123 MG/DL — SIGNIFICANT CHANGE UP
TRIGL SERPL-MCNC: 123 MG/DL — SIGNIFICANT CHANGE UP
WBC # BLD: 6.1 K/UL — SIGNIFICANT CHANGE UP (ref 4.8–10.8)
WBC # BLD: 6.1 K/UL — SIGNIFICANT CHANGE UP (ref 4.8–10.8)
WBC # FLD AUTO: 6.1 K/UL — SIGNIFICANT CHANGE UP (ref 4.8–10.8)
WBC # FLD AUTO: 6.1 K/UL — SIGNIFICANT CHANGE UP (ref 4.8–10.8)

## 2023-12-01 PROCEDURE — 99233 SBSQ HOSP IP/OBS HIGH 50: CPT

## 2023-12-01 RX ADMIN — Medication 81 MILLIGRAM(S): at 11:42

## 2023-12-01 RX ADMIN — Medication 1: at 07:36

## 2023-12-01 RX ADMIN — GABAPENTIN 100 MILLIGRAM(S): 400 CAPSULE ORAL at 21:34

## 2023-12-01 RX ADMIN — LISINOPRIL 10 MILLIGRAM(S): 2.5 TABLET ORAL at 05:10

## 2023-12-01 RX ADMIN — SENNA PLUS 2 TABLET(S): 8.6 TABLET ORAL at 21:33

## 2023-12-01 RX ADMIN — GABAPENTIN 100 MILLIGRAM(S): 400 CAPSULE ORAL at 05:10

## 2023-12-01 RX ADMIN — CLOPIDOGREL BISULFATE 75 MILLIGRAM(S): 75 TABLET, FILM COATED ORAL at 11:42

## 2023-12-01 RX ADMIN — Medication 1: at 16:27

## 2023-12-01 RX ADMIN — ATORVASTATIN CALCIUM 80 MILLIGRAM(S): 80 TABLET, FILM COATED ORAL at 21:34

## 2023-12-01 RX ADMIN — GABAPENTIN 100 MILLIGRAM(S): 400 CAPSULE ORAL at 14:39

## 2023-12-01 RX ADMIN — Medication 2: at 11:38

## 2023-12-01 RX ADMIN — ENOXAPARIN SODIUM 40 MILLIGRAM(S): 100 INJECTION SUBCUTANEOUS at 17:39

## 2023-12-01 NOTE — OCCUPATIONAL THERAPY INITIAL EVALUATION ADULT - LIVES WITH, PROFILE
condo +1 step to enter with hand rail then no steps inside +walk-in shower with grab bar and shower seat +toilet with grab bar/alone

## 2023-12-01 NOTE — CONSULT NOTE ADULT - NS ATTEND AMEND GEN_ALL_CORE FT
Seen and examined the patient. Reports chronic symptoms of left arm and leg weakness but yesterday felt weaker. In addition reports left arm pain which might be contributive. Agree w Brain MRI and q4h neuro check and DAPT. Will follow

## 2023-12-01 NOTE — OCCUPATIONAL THERAPY INITIAL EVALUATION ADULT - LONG TERM MEMORY, REHAB EVAL
Detail Level: Zone Include Location In Plan?: Yes Hide Include Location In Plan Question?: No Detail Level: Simple intact

## 2023-12-01 NOTE — OCCUPATIONAL THERAPY INITIAL EVALUATION ADULT - ADDITIONAL COMMENTS
PLOF obtained from pt. As per pt, lives in a handicap condo and has grab bars in every room. Pt owns RW, grab bar in shower, shower seat, and grab bar in toilet.   (-) Driving

## 2023-12-01 NOTE — OCCUPATIONAL THERAPY INITIAL EVALUATION ADULT - PERSONAL SAFETY AND JUDGMENT, REHAB EVAL
Pt reported how she is able to safely and independently maneuver her home environment when she feels onset feelings of dizziness./intact

## 2023-12-01 NOTE — CONSULT NOTE ADULT - ASSESSMENT
IMPRESSION: Rehab of 88  y.o  f rehab  for  dcline  cva  lt side weakness  vertigo     PRECAUTIONS: [  ] Cardiac  [  ] Respiratory  [  ] Seizures [  ] Contact Isolation  [  ] Droplet Isolation  [ FALL ] Other    Weight Bearing Status:     RECOMMENDATION:    Out of Bed to Chair     DVT/Decubiti Prophylaxis    REHAB PLAN:     [  x ] Bedside P/T 3-5 times a week   [ x  ]   Bedside O/T  2-3 times a week             [   ] No Rehab Therapy Indicated                   [   ]  Speech Therapy   Conditioning/ROM                                    ADL  Bed Mobility                                               Conditioning/ROM  Transfers                                                     Bed Mobility  Sitting /Standing Balance                         Transfers                                        Gait Training                                               Sitting/Standing Balance  Stair Training [   ]Applicable                    Home equipment Eval                                                                        Splinting  [   ] Only      GOALS:   ADL   [ x ]   Independent                    Transfers  [x   ] Independent                          Ambulation  [ x  ] Independent     [    ] With device                            [ x  ]  CG                                                         [ x  ]  CG                                                                  [   ] CG                            [    ] Min A                                                   [   ] Min A                                                              [   ] Min  A          DISCHARGE PLAN:   [   ]  Good candidate for Intensive Rehabilitation/Hospital based-4A [facility]                                             Will tolerate 3hrs Intensive Rehab Daily                                       [  xx  ]  Short Term Rehab in Skilled Nursing Facility                                       [    ]  Home with Outpatient or VN services                                         [    ]  Possible Candidate for Intensive Hospital based Rehab                                       
88F w/PMHx of HTN, HLD, DM, CVA with residual L sided weakness, breast CA s/p mastectomy, CAD on Plavix and ASA presents to ED with complaints of LUE weakness and vertigo since this AM.  Patient reports chronic vertigo and weakness and paresthesias to not only the LUE but the RUE as well.  She reports the LUE slightly worsened today and she dropped a cup of coffee which caused her concern and to come for evaluation  Patient also reports chronic vertigo since she was a child, exacerbates and remits  Reports its worse with head movement and position change.  Currently on Meclizine PRN.  NIHSS 3 which is chronic (L sided weakness and dysmetria). CTH no acute findings, CTA perfusion stable since July 2023 no new stenosis.    # dizziness, r/o acute cva  - MRI brain no contrast  - q4h nc  - cont dapt, statin  - pt/ot/pmr/s&s

## 2023-12-01 NOTE — SWALLOW BEDSIDE ASSESSMENT ADULT - ADDITIONAL RECOMMENDATIONS
Pt previously benefitted from outpt SLP services for speech/language tx for word finding difficulties. Consider outpt SLP services upon d/c. d/c from SLP Services, reconsult PRN.

## 2023-12-01 NOTE — PHYSICAL THERAPY INITIAL EVALUATION ADULT - ASR WT BEARING STATUS EVAL
I personally performed the service described in the documentation recorded by the scribe in my presence, and it accurately and completely records my words and actions.
no weight-bearing restrictions

## 2023-12-01 NOTE — OCCUPATIONAL THERAPY INITIAL EVALUATION ADULT - GENERAL OBSERVATIONS, REHAB EVAL
8:33-9:02; Chart reviewed, ok to treat by Occupational Therapist as confirmed by NAINA Wheatley, Pt received semifoweler +tele +pulse ox +primafit +RUE heplock in NAD. Pt denied pain at rest and in agreement with OT IE.

## 2023-12-01 NOTE — PHYSICAL THERAPY INITIAL EVALUATION ADULT - PERTINENT HX OF CURRENT PROBLEM, REHAB EVAL
88F w/PMHx of HTN, HLD, DM, CVA with residual L sided weakness, breast CA s/p mastectomy, CAD on Plavix and ASA presents to ED with complaints of LUE weakness and vertigo since this AM.  Patient reports chronic vertigo and weakness and paresthesias to not only the LUE but the RUE as well.  She reports the LUE slightly worsened today and she dropped a cup of coffee which caused her concern and to come for evaluation  Patient also reports chronic vertigo since she was a child, exacerbates and remits  Reports its worth with head movement and position change.  Currently on Meclizine PRN.  No CP/SOB.  No abdominal or urinary complaints.  Patient with similar symptoms and admission 8/23 with negative workup.  Of note, patient hypertensive in ED, reports does not take BP meds.

## 2023-12-01 NOTE — CONSULT NOTE ADULT - SUBJECTIVE AND OBJECTIVE BOX
VAN HARMON     88y     Female    MRN-[id number]                                                           CC:Patient is a 88y old  Female who presents with a chief complaint of Vertigo and LUE weakness x 1 day (01 Dec 2023 10:30)    Neuro: Patient with acute onset room spinning dizziness worse with head turning, increased LUE weakness that is worse than normal. She has chronic deficits from prior stroke.    HPI:  88F w/PMHx of HTN, HLD, DM, CVA with residual L sided weakness, breast CA s/p mastectomy, CAD on Plavix and ASA presents to ED with complaints of LUE weakness and vertigo since this AM.  Patient reports chronic vertigo and weakness and paresthesias to not only the LUE but the RUE as well.  She reports the LUE slightly worsened today and she dropped a cup of coffee which caused her concern and to come for evaluation  Patient also reports chronic vertigo since she was a child, exacerbates and remits  Reports its worth with head movement and position change.  Currently on Meclizine PRN.  No CP/SOB.  No abdominal or urinary complaints.  Patient with similar symptoms and admission 8/23 with negative workup.  Of note, patient hypertensive in ED, reports does not take BP meds.  No complaints of HA, CP, changes in vision.   (30 Nov 2023 15:24)      ROS:  Constitutional, Neurological, Psychiatric, Eyes, ENT, Cardiovascular, Respiratory, Gastrointestinal, Genitourinary, Musculoskeletal, Integumentary, Endocrine and Heme/Lymph are otherwise negative.     Social History: No smoking, No drinking, No drug use    FAMILY HISTORY:  Family history of stroke (Mother)        HEALTH ISSUES - PROBLEM Dx:  88F w/PMHx of HTN, HLD, DM, CVA with residual L sided weakness, breast CA s/p mastectomy, CAD on Plavix and ASA        Vital Signs Last 24 Hrs  T(C): 36.3 (01 Dec 2023 07:10), Max: 37.3 (30 Nov 2023 12:20)  T(F): 97.3 (01 Dec 2023 07:10), Max: 99.2 (30 Nov 2023 12:20)  HR: 54 (01 Dec 2023 07:05) (54 - 101)  BP: 103/49 (01 Dec 2023 07:05) (94/50 - 215/84)  BP(mean): 70 (01 Dec 2023 07:05) (63 - 107)  RR: 17 (01 Dec 2023 07:10) (16 - 24)  SpO2: 97% (01 Dec 2023 07:05) (95% - 98%)    Parameters below as of 30 Nov 2023 14:20  Patient On (Oxygen Delivery Method): room air          Neuro Exam:  Orientation: oriented to person, oriented to place and oriented to time.   Attention: normal concentrating ability and visual attention was not decreased.   Language: no difficulty naming common objects, no difficulty repeating a phrase, no difficulty writing a sentence, fluency intact, comprehension intact and reading intact.   Fund of knowledge: displays adequate knowledge of personal past history.   Cranial Nerves: visual acuity intact bilaterally, visual fields full to confrontation, pupils equal round and reactive to light, extraocular motion intact, facial sensation intact symmetrically, face symmetrical, hearing was intact bilaterally, tongue and palate midline, head turning and shoulder shrug symmetric and there was no tongue deviation with protrusion.   Motor: LUE 4/5, RUE 5/5, LUE 4+/5, RLE 5/5  Sensory exam: light touch was intact.   Coordination:. balance was intact. + past pointing LUE.      NIHSS: chronic 3    Allergies    codeine (Hives; Urticaria)         Home Medications:  atorvastatin 40 mg oral tablet: 1 orally once a day (at bedtime) (30 Nov 2023 15:30)  gabapentin 100 mg oral capsule: 1 cap(s) orally 3 times a day (30 Nov 2023 15:49)  glimepiride 4 mg oral tablet: 1 tab(s) orally 2 times a day (30 Nov 2023 15:29)  metFORMIN 1000 mg oral tablet: 1 tab(s) orally 2 times a day (30 Nov 2023 15:49)  polyethylene glycol 3350 oral powder for reconstitution: 17 gram(s) orally once a day (30 Nov 2023 15:30)  senna leaf extract oral tablet: 2 tab(s) orally once a day (at bedtime) (30 Nov 2023 15:30)      MEDICATIONS  (STANDING):  aspirin  chewable 81 milliGRAM(s) Oral daily  atorvastatin 80 milliGRAM(s) Oral at bedtime  chlorhexidine 2% Cloths 1 Application(s) Topical <User Schedule>  clopidogrel Tablet 75 milliGRAM(s) Oral daily  dextrose 5%. 1000 milliLiter(s) (50 mL/Hr) IV Continuous <Continuous>  dextrose 5%. 1000 milliLiter(s) (100 mL/Hr) IV Continuous <Continuous>  dextrose 50% Injectable 25 Gram(s) IV Push once  dextrose 50% Injectable 25 Gram(s) IV Push once  dextrose 50% Injectable 12.5 Gram(s) IV Push once  enoxaparin Injectable 40 milliGRAM(s) SubCutaneous every 24 hours  gabapentin 100 milliGRAM(s) Oral three times a day  glucagon  Injectable 1 milliGRAM(s) IntraMuscular once  influenza  Vaccine (HIGH DOSE) 0.7 milliLiter(s) IntraMuscular once  insulin lispro (ADMELOG) corrective regimen sliding scale   SubCutaneous three times a day before meals  lisinopril 10 milliGRAM(s) Oral daily  senna 2 Tablet(s) Oral at bedtime    MEDICATIONS  (PRN):  dextrose Oral Gel 15 Gram(s) Oral once PRN Blood Glucose LESS THAN 70 milliGRAM(s)/deciliter  hydrALAZINE 10 milliGRAM(s) Oral every 6 hours PRN Systolic blood pressure >180  meclizine 25 milliGRAM(s) Oral three times a day PRN for dizziness  polyethylene glycol 3350 17 Gram(s) Oral daily PRN Constipation      LABS:                        10.8   6.10  )-----------( 174      ( 01 Dec 2023 05:37 )             33.1     12-01    141  |  104  |  15  ----------------------------<  202<H>  4.5   |  27  |  1.0    Ca    9.5      01 Dec 2023 05:37    TPro  5.6<L>  /  Alb  3.7  /  TBili  <0.2  /  DBili  x   /  AST  14  /  ALT  10  /  AlkPhos  75  12-01    PT/INR - ( 30 Nov 2023 13:00 )   PT: 11.70 sec;   INR: 1.03 ratio         PTT - ( 30 Nov 2023 13:00 )  PTT:33.6 sec          CT Brain Stroke Protocol (11.30.23 @ 12:31)   IMPRESSION:  1.  No evidence of acute intracranial hemorrhage or large territory   infarct. Stable exam since 7/28/2023.  2.  Stable chronic microvascular changes and multiple chronic infarcts.    CT Brain Perfusion Maps Stroke (11.30.23 @ 12:49)   IMPRESSION:  1.  No evidence of acute large vessel occlusion. Normal perfusion images.  2.  Mild atheromatous changes, stable since the previous study dated   7/28/2023.      
HPI:87 y/o F  w/PMHx of HTN, HLD, DM, CVA with residual L sided weakness, breast CA s/p mastectomy, CAD on Plavix and ASA presents to ED with complaints of LUE weakness and vertigo since this AM.  Patient reports chronic vertigo and weakness and paresthesias to not only the LUE but the RUE as well.  She reports the LUE slightly worsened today and she dropped a cup of coffee which caused her concern and to come for evaluation  Patient also reports chronic vertigo since she was a child, exacerbates and remits  Reports its worth with head movement and position change.  Currently on Meclizine PRN.  No CP/SOB.  No abdominal or urinary complaints.  Patient with similar symptoms and admission 8/23 with negative workup.  Of note, patient hypertensive in ED, reports does not take BP meds.  No complaints of HA, CP, changes in vision.   ptn referred  to acute rehab  for eval and  tx  seen and  exam  at  bed  side  no  new c/o  aox3  ptn lab  ,  imaging  ,  medical  notes are appreciated  and  reviewed      PTN  REFERRED TO ACUTE  REHAB  FOR  EVAL AND  TX   PAST MEDICAL & SURGICAL HISTORY:  DM (diabetes mellitus)      HTN (hypertension)      Vertigo      CVA (cerebral vascular accident)      Breast cancer      H/O mastectomy  right          Hospital Course:    TODAY'S SUBJECTIVE & REVIEW OF SYMPTOMS:     Constitutional WNL   Cardio WNL   Resp WNL   GI WNL  Heme WNL  Endo WNL  Skin WNL  MSK WNL  Neuro WNL  Cognitive WNL  Psych WNL      MEDICATIONS  (STANDING):  aspirin  chewable 81 milliGRAM(s) Oral daily  atorvastatin 80 milliGRAM(s) Oral at bedtime  chlorhexidine 2% Cloths 1 Application(s) Topical <User Schedule>  clopidogrel Tablet 75 milliGRAM(s) Oral daily  dextrose 5%. 1000 milliLiter(s) (50 mL/Hr) IV Continuous <Continuous>  dextrose 5%. 1000 milliLiter(s) (100 mL/Hr) IV Continuous <Continuous>  dextrose 50% Injectable 25 Gram(s) IV Push once  dextrose 50% Injectable 25 Gram(s) IV Push once  dextrose 50% Injectable 12.5 Gram(s) IV Push once  enoxaparin Injectable 40 milliGRAM(s) SubCutaneous every 24 hours  gabapentin 100 milliGRAM(s) Oral three times a day  glucagon  Injectable 1 milliGRAM(s) IntraMuscular once  influenza  Vaccine (HIGH DOSE) 0.7 milliLiter(s) IntraMuscular once  insulin lispro (ADMELOG) corrective regimen sliding scale   SubCutaneous three times a day before meals  lisinopril 10 milliGRAM(s) Oral daily  senna 2 Tablet(s) Oral at bedtime    MEDICATIONS  (PRN):  dextrose Oral Gel 15 Gram(s) Oral once PRN Blood Glucose LESS THAN 70 milliGRAM(s)/deciliter  hydrALAZINE 10 milliGRAM(s) Oral every 6 hours PRN Systolic blood pressure >180  meclizine 25 milliGRAM(s) Oral three times a day PRN for dizziness  polyethylene glycol 3350 17 Gram(s) Oral daily PRN Constipation      FAMILY HISTORY:  Family history of stroke (Mother)        Allergies    codeine (Hives; Urticaria)    Intolerances        SOCIAL HISTORY:    [  ] Etoh  [  ] Smoking  [  ] Substance abuse     Home Environment:  [ x ] Home Alone  [  ] Lives with Family  [  ] Home Health Aid    Dwelling:  [ x ] Apartment  [ x ] Private House  [  ] Adult Home  [  ] Skilled Nursing Facility      [  ] Short Term  [  ] Long Term  [ - ] Stairs       Elevator [  ]    FUNCTIONAL STATUS PTA: (Check all that apply)  Ambulation: [  x ]Independent    [  ] Dependent     [  ] Non-Ambulatory  Assistive Device: [  ] SA Cane  [x  ]  Q Cane  [  ] Walker  [x  ]  Wheelchair  ADL : [ x ] Independent  [  ]  Dependent       Vital Signs Last 24 Hrs  T(C): 36.7 (30 Nov 2023 16:34), Max: 37.3 (30 Nov 2023 12:20)  T(F): 98 (30 Nov 2023 16:34), Max: 99.2 (30 Nov 2023 12:20)  HR: 60 (30 Nov 2023 16:34) (60 - 101)  BP: 184/74 (30 Nov 2023 16:34) (139/72 - 215/84)  BP(mean): 107 (30 Nov 2023 16:34) (107 - 107)  RR: 18 (30 Nov 2023 16:34) (16 - 18)  SpO2: 98% (30 Nov 2023 16:34) (95% - 98%)    Parameters below as of 30 Nov 2023 14:20  Patient On (Oxygen Delivery Method): room air          PHYSICAL EXAM: Alert & Oriented X3  GENERAL: NAD, well-groomed, well-developed  HEAD:  Atraumatic, Normocephalic  EYES: EOMI, PERRLA, conjunctiva and sclera clear  NECK: Supple, No JVD, Normal thyroid  CHEST/LUNG: Clear to percussion bilaterally; No rales, rhonchi, wheezing, or rubs  HEART: Regular rate and rhythm; No murmurs, rubs, or gallops  ABDOMEN: Soft, Nontender, Nondistended; Bowel sounds present  EXTREMITIES:  2+ Peripheral Pulses, No clubbing, cyanosis, or edema    NERVOUS SYSTEM:  Cranial Nerves 2-12 intact [ x ] Abnormal  [  ]  ROM: WFL all extremities [p  ]  Abnormal [  ]  Motor Strength: WFL all extremities  [ 4-5/5 all ext ]  Abnormal [  ]  Sensation: intact to light touch [  x] Abnormal [  ]  Reflexes: Symmetric [ x ]  Abnormal [  ]    FUNCTIONAL STATUS:  Bed Mobility: Independent [  ]  Supervision [  ]  Needs Assistance [  ]  N/A [x  ]  Transfers: Independent [  ]  Supervision [  ]  Needs Assistance [  ]  N/A [x  ]   Ambulation: Independent [  ]  Supervision [  ]  Needs Assistance [  ]  N/A [x  ]  ADL: Independent [  ] Requires Assistance [  ] N/A [x  ]  SEE PT/OT IE NOTES    LABS:                        12.1   8.29  )-----------( 191      ( 30 Nov 2023 13:00 )             35.7     11-30    141  |  102  |  15  ----------------------------<  141<H>  4.1   |  25  |  0.9    Ca    10.2      30 Nov 2023 13:00    TPro  6.7  /  Alb  4.5  /  TBili  0.3  /  DBili  x   /  AST  16  /  ALT  12  /  AlkPhos  93  11-30    PT/INR - ( 30 Nov 2023 13:00 )   PT: 11.70 sec;   INR: 1.03 ratio         PTT - ( 30 Nov 2023 13:00 )  PTT:33.6 sec  Urinalysis Basic - ( 30 Nov 2023 14:10 )    Color: Yellow / Appearance: Clear / SG: >1.030 / pH: x  Gluc: x / Ketone: Negative mg/dL  / Bili: Negative / Urobili: 0.2 mg/dL   Blood: x / Protein: Trace mg/dL / Nitrite: Negative   Leuk Esterase: Negative / RBC: x / WBC x   Sq Epi: x / Non Sq Epi: x / Bacteria: x        RADIOLOGY & ADDITIONAL STUDIES:< from: CT Brain Perfusion Maps Stroke (11.30.23 @ 12:49) >  IMPRESSION:    1.  No evidence of acute large vessel occlusion. Normal perfusion images.    2.  Mild atheromatous changes, stable since the previous study dated     < end of copied text >      Assesment:

## 2023-12-01 NOTE — PHYSICAL THERAPY INITIAL EVALUATION ADULT - ADDITIONAL COMMENTS
pt lives alone in a condo apartment with 1 steps to enter and one level without stairs inside.  Pt amb with RW prior to admission.  Pt has her furniture situated in the house in a particular way, so if she loses balance she can lower herself on the floor and then crawl to the nearest piece of furniture and get up safely

## 2023-12-01 NOTE — SWALLOW BEDSIDE ASSESSMENT ADULT - SLP GENERAL OBSERVATIONS
Pt. received in bed awake and alert. Reports h/o speech/language tx for word finding difficulty after old CVA. Reports that speech/lang/cog are at baseline. No word finding deficits noted in conversation or with NIHSS eval.

## 2023-12-01 NOTE — OCCUPATIONAL THERAPY INITIAL EVALUATION ADULT - RANGE OF MOTION EXAMINATION
RUE AROM WNL; Left shoulder/elbow AROM 3/4 range; PROM shoulder/elbow WNL; left hand AROM WNL/deficits as listed below

## 2023-12-01 NOTE — PROGRESS NOTE ADULT - ASSESSMENT
88F w/PMHx of HTN, HLD, DM, CVA with residual L sided weakness, breast CA s/p mastectomy, CAD on Plavix and ASA presents to ED with complaints of LUE weakness and vertigo since this AM.  Patient reports chronic vertigo and weakness and paresthesias to not only the LUE but the RUE as well.  She reports the LUE slightly worsened today and she dropped a cup of coffee which caused her concern and to come for evaluation  Patient also reports chronic vertigo since she was a child, exacerbates and remits  Reports its worth with head movement and position change.  Currently on Meclizine PRN.  No CP/SOB.  No abdominal or urinary complaints.  Patient with similar symptoms and admission 8/23 with negative workup.  Of note, patient hypertensive in ED, reports does not take BP meds.  No complaints of HA, CP, changes in vision.      #Vertigo with acute on chronic LUE weakness - still feels dizzy today 12/1 after sitting in chair - described both room spinning and off balance   #Hx of CVA - acute on chronic LUE weakness   - admit to SDU  - Neuro checks q4h  - c/w ASA/Plavix/Statin, increase Atorvastatin 80mg QHS per Neuro  - Neuro FU, MRI Brain pending   - ECHO w/bubble study 8/23 reviewed  - Lipids/A1C  in am  - PT/OT/SLP/PMR eval     #HTN Urgency  - was discharged on 5mg quinapril in August, not taking at home  - Permissive HTN approx 160 goal, will start Lisinopril 10mg PO QD with 1st dose and monitor  - Hydralaizine 10mg IVP q6h PRN with goal Bp of 160  - DASH Diet     #DM2  - Hold PO meds  - FS AC TID w/ISS, consider basal/bolus coverage if persistently hyperglycemic  - CHO diet  CAPILLARY BLOOD GLUCOSE      POCT Blood Glucose.: 220 mg/dL (01 Dec 2023 11:32)  POCT Blood Glucose.: 174 mg/dL (01 Dec 2023 07:17)  POCT Blood Glucose.: 171 mg/dL (30 Nov 2023 18:08)  POCT Blood Glucose.: 69 mg/dL (30 Nov 2023 17:17)      #Hx of CAD  - c/w ASA/Plavix/Statin    #Hx of Breast Ca s/p right mastectomy  - outpatient Onc FU        dispo - after PT, neuro eval, MRI

## 2023-12-01 NOTE — SWALLOW BEDSIDE ASSESSMENT ADULT - SLP PERTINENT HISTORY OF CURRENT PROBLEM
88F w/PMHx of HTN, HLD, DM, CVA with residual L sided weakness, breast CA s/p mastectomy, CAD on Plavix and ASA presents to ED with complaints of LUE weakness and vertigo since this AM.  Patient reports chronic vertigo and weakness and paresthesias to not only the LUE but the RUE as well.  She reports the LUE slightly worsened today and she dropped a cup of coffee which caused her concern and to come for evaluation  Patient also reports chronic vertigo since she was a child, exacerbates and remits  Reports its worse with head movement and position change.  Currently on Meclizine PRN.  NIHSS 3 which is chronic (L sided weakness and dysmetria). CTH no acute findings, CTA perfusion stable since July 2023 no new stenosis.

## 2023-12-01 NOTE — PHYSICAL THERAPY INITIAL EVALUATION ADULT - GENERAL OBSERVATIONS, REHAB EVAL
9;20-9;50 pt was seen for PT IE at bed side, pt is agreeable, chart thoroughly reviewed, RN Corry is aware.  Pt received and left sitting in bed side chair, in no apparent distress, +telemonitoring, +BP cuff and +pulse ox, +hep lock, +primafit, +call bell within reach, bed side table at reach.  Pt is [place], amplifier was given to the pt and adjusted

## 2023-12-01 NOTE — OCCUPATIONAL THERAPY INITIAL EVALUATION ADULT - PERTINENT HX OF CURRENT PROBLEM, REHAB EVAL
88F w/PMHx of HTN, HLD, DM, CVA with residual L sided weakness, breast CA s/p mastectomy, CAD on Plavix and ASA presents to ED with complaints of LUE weakness and vertigo since this AM.  Patient reports chronic vertigo and weakness and paresthesias to not only the LUE but the RUE as well.  She reports the LUE slightly worsened today and she dropped a cup of coffee which caused her concern and to come for evaluation  Patient also reports chronic vertigo since she was a child, exacerbates and remits  Reports its worth with head movement and position change.  Currently on Meclizine PRN.  No CP/SOB.   Patient with similar symptoms and admission 8/23 with negative workup.  Of note, patient hypertensive in ED, reports does not take BP meds.  No complaints of HA, CP, changes in vision.      CTA 11/30: No evidence of acute large vessel occlusion. Normal perfusion images. Mild atheromatous changes, stable since the previous study dated 7/28/2023.  CT Brain 11/20: No evidence of acute intracranial hemorrhage or large territory   infarct. Stable exam since 7/28/2023. Stable chronic microvascular changes and multiple chronic infarcts. 88F w/PMHx of HTN, HLD, DM, CVA with residual L sided weakness, breast CA s/p mastectomy, CAD on Plavix and ASA presents to ED with complaints of LUE weakness and vertigo since this AM.  Patient reports chronic vertigo and weakness and paresthesias to not only the LUE but the RUE as well.  She reports the LUE slightly worsened today and she dropped a cup of coffee which caused her concern and to come for evaluation  Patient also reports chronic vertigo since she was a child, exacerbates and remits  Reports its worth with head movement and position change.  Currently on Meclizine PRN.  No CP/SOB.   Patient with similar symptoms and admission 8/23 with negative workup.  Of note, patient hypertensive in ED, reports does not take BP meds.  No complaints of HA, CP, changes in vision.      CTA 11/30: No evidence of acute large vessel occlusion. Normal perfusion images. Mild atheromatous changes, stable since the previous study dated 7/28/2023.  CT Brain 11/20: No evidence of acute intracranial hemorrhage or large territory   infarct. Stable exam since 7/28/2023. Stable chronic microvascular changes and multiple chronic infarcts.    MRI Pending 88F w/PMHx of HTN, HLD, DM, CVA with residual L sided weakness, breast CA s/p mastectomy, CAD on Plavix and ASA presents to ED with complaints of LUE weakness and vertigo since this AM.  Patient reports chronic vertigo and weakness and paresthesias to not only the LUE but the RUE as well.  She reports the LUE slightly worsened today and she dropped a cup of coffee which caused her concern and to come for evaluation  Patient also reports chronic vertigo since she was a child, exacerbates and remits  Reports its worth with head movement and position change.  Currently on Meclizine PRN.  No CP/SOB.   Patient with similar symptoms and admission 8/23 with negative workup.  Of note, patient hypertensive in ED, reports does not take BP meds.  No complaints of HA, CP, changes in vision.      CTA 11/30: No evidence of acute large vessel occlusion. Normal perfusion images. Mild atheromatous changes, stable since the previous study dated 7/28/2023.  CT Brain 11/30: No evidence of acute intracranial hemorrhage or large territory   infarct. Stable exam since 7/28/2023. Stable chronic microvascular changes and multiple chronic infarcts.    MRI Pending

## 2023-12-02 ENCOUNTER — TRANSCRIPTION ENCOUNTER (OUTPATIENT)
Age: 88
End: 2023-12-02

## 2023-12-02 LAB
GLUCOSE BLDC GLUCOMTR-MCNC: 126 MG/DL — HIGH (ref 70–99)
GLUCOSE BLDC GLUCOMTR-MCNC: 126 MG/DL — HIGH (ref 70–99)
GLUCOSE BLDC GLUCOMTR-MCNC: 222 MG/DL — HIGH (ref 70–99)
GLUCOSE BLDC GLUCOMTR-MCNC: 222 MG/DL — HIGH (ref 70–99)
GLUCOSE BLDC GLUCOMTR-MCNC: 225 MG/DL — HIGH (ref 70–99)
GLUCOSE BLDC GLUCOMTR-MCNC: 225 MG/DL — HIGH (ref 70–99)
GLUCOSE BLDC GLUCOMTR-MCNC: 293 MG/DL — HIGH (ref 70–99)
GLUCOSE BLDC GLUCOMTR-MCNC: 293 MG/DL — HIGH (ref 70–99)

## 2023-12-02 PROCEDURE — 99233 SBSQ HOSP IP/OBS HIGH 50: CPT

## 2023-12-02 RX ORDER — INSULIN LISPRO 100/ML
4 VIAL (ML) SUBCUTANEOUS ONCE
Refills: 0 | Status: COMPLETED | OUTPATIENT
Start: 2023-12-02 | End: 2023-12-02

## 2023-12-02 RX ORDER — SODIUM CHLORIDE 9 MG/ML
500 INJECTION INTRAMUSCULAR; INTRAVENOUS; SUBCUTANEOUS ONCE
Refills: 0 | Status: COMPLETED | OUTPATIENT
Start: 2023-12-02 | End: 2023-12-02

## 2023-12-02 RX ORDER — LISINOPRIL 2.5 MG/1
1 TABLET ORAL
Qty: 30 | Refills: 0
Start: 2023-12-02

## 2023-12-02 RX ADMIN — Medication 4 UNIT(S): at 21:58

## 2023-12-02 RX ADMIN — Medication 81 MILLIGRAM(S): at 13:23

## 2023-12-02 RX ADMIN — SENNA PLUS 2 TABLET(S): 8.6 TABLET ORAL at 21:57

## 2023-12-02 RX ADMIN — Medication 2: at 16:30

## 2023-12-02 RX ADMIN — GABAPENTIN 100 MILLIGRAM(S): 400 CAPSULE ORAL at 13:24

## 2023-12-02 RX ADMIN — CHLORHEXIDINE GLUCONATE 1 APPLICATION(S): 213 SOLUTION TOPICAL at 06:01

## 2023-12-02 RX ADMIN — ATORVASTATIN CALCIUM 80 MILLIGRAM(S): 80 TABLET, FILM COATED ORAL at 21:57

## 2023-12-02 RX ADMIN — Medication 2: at 13:22

## 2023-12-02 RX ADMIN — GABAPENTIN 100 MILLIGRAM(S): 400 CAPSULE ORAL at 21:57

## 2023-12-02 RX ADMIN — ENOXAPARIN SODIUM 40 MILLIGRAM(S): 100 INJECTION SUBCUTANEOUS at 16:31

## 2023-12-02 RX ADMIN — SODIUM CHLORIDE 500 MILLILITER(S): 9 INJECTION INTRAMUSCULAR; INTRAVENOUS; SUBCUTANEOUS at 02:06

## 2023-12-02 RX ADMIN — CLOPIDOGREL BISULFATE 75 MILLIGRAM(S): 75 TABLET, FILM COATED ORAL at 13:23

## 2023-12-02 RX ADMIN — GABAPENTIN 100 MILLIGRAM(S): 400 CAPSULE ORAL at 06:01

## 2023-12-02 NOTE — DISCHARGE NOTE PROVIDER - CARE PROVIDER_API CALL
Huan Castro  Neurology  [address] [place] [address], [address] [address]  Phone: [phone]  Fax: [phone]  Follow Up Time: 2 weeks   Huan Castro  Neurology  [address] [place] [address], [address] [address]  Phone: [phone]  Fax: [phone]  Follow Up Time: 2 weeks   Huan Castro  Neurology  [address] [place] [address], [address] [address]  Phone: [phone]  Fax: [phone]  Follow Up Time: 2 weeks

## 2023-12-02 NOTE — DISCHARGE NOTE PROVIDER - CARE PROVIDERS DIRECT ADDRESSES
,[email or web address]

## 2023-12-02 NOTE — CHART NOTE - NSCHARTNOTEFT_GEN_A_CORE
MRI brain imaging reviewed and chronic stroke seen.  No acute findings.  No further in patient neurological workup can restart home medications          < from: MR Head No Cont (12.02.23 @ 11:48) >    IMPRESSION:  There is no evidence of acute intracranial pathology. No evidence of   acute infarct, intracranial hemorrhage or mass effect.    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    --- End of Report ---    < end of copied text >

## 2023-12-02 NOTE — DISCHARGE NOTE PROVIDER - PROVIDER TOKENS
PROVIDER:[TOKEN:[[id number]],FOLLOWUP:[2 weeks]] PROVIDER:[TOKEN:[[id number]],FOLLOWUP:[2 weeks]] PROVIDER:[TOKEN:[[id number]],FOLLOWUP:[2 weeks]]

## 2023-12-02 NOTE — DISCHARGE NOTE PROVIDER - NSDCHHHOMEBOUND_GEN_ALL_CORE
Stroke/TBI (neurological/cognitive impairment)/Fall risk Ataxic gait/Stroke/TBI (neurological/cognitive impairment)/Fall risk

## 2023-12-02 NOTE — DISCHARGE NOTE PROVIDER - NSDCCPCAREPLAN_GEN_ALL_CORE_FT
PRINCIPAL DISCHARGE DIAGNOSIS  Diagnosis: Dizziness  Assessment and Plan of Treatment: likely due to vertigo - MRI done - no new stroke - only chronic stroke - chronic left arm weakness from prior stroke - follow up with neuro outpatient      SECONDARY DISCHARGE DIAGNOSES  Diagnosis: Ataxia  Assessment and Plan of Treatment:      PRINCIPAL DISCHARGE DIAGNOSIS  Diagnosis: Dizziness  Assessment and Plan of Treatment: likely due to vertigo - MRI done - no new stroke - only chronic stroke - chronic left arm weakness from prior stroke - follow up with neuro outpatient

## 2023-12-02 NOTE — PROGRESS NOTE ADULT - ASSESSMENT
88F w/PMHx of HTN, HLD, DM, CVA with residual L sided weakness, breast CA s/p mastectomy, CAD on Plavix and ASA presents to ED with complaints of LUE weakness and vertigo since this AM.  Patient reports chronic vertigo and weakness and paresthesias to not only the LUE but the RUE as well.  She reports the LUE slightly worsened today and she dropped a cup of coffee which caused her concern and to come for evaluation  Patient also reports chronic vertigo since she was a child, exacerbates and remits  Reports its worth with head movement and position change.  Currently on Meclizine PRN.  No CP/SOB.  No abdominal or urinary complaints.  Patient with similar symptoms and admission 8/23 with negative workup.  Of note, patient hypertensive in ED, reports does not take BP meds.  No complaints of HA, CP, changes in vision.      #Vertigo with acute on chronic LUE weakness - still feels dizzy today 12/1 after sitting in chair - described both room spinning and off balance   #Hx of CVA - acute on chronic LUE weakness   - admit to SDU  - Neuro checks q4h  - c/w ASA/Plavix/Statin, increase Atorvastatin 80mg QHS per Neuro  - Neuro FU, MRI Brain pending   - ECHO w/bubble study 8/23 reviewed  - Lipids/A1C  in am  - PT/OT/SLP/PMR eval     #HTN Urgency  - was discharged on 5mg quinapril in August, not taking at home  - Permissive HTN approx 160 goal, will start Lisinopril 10mg PO QD with 1st dose and monitor  - Hydralaizine 10mg IVP q6h PRN with goal Bp of 160  - DASH Diet     #DM2  - Hold PO meds  - FS AC TID w/ISS, consider basal/bolus coverage if persistently hyperglycemic  - CHO diet    CAPILLARY BLOOD GLUCOSE      POCT Blood Glucose.: 126 mg/dL (02 Dec 2023 07:33)  POCT Blood Glucose.: 206 mg/dL (01 Dec 2023 21:30)  POCT Blood Glucose.: 193 mg/dL (01 Dec 2023 16:19)  POCT Blood Glucose.: 220 mg/dL (01 Dec 2023 11:32)      #Hx of CAD  - c/w ASA/Plavix/Statin    #Hx of Breast Ca s/p right mastectomy  - outpatient Onc FU        dispo - after PT, neuro eval, MRI 88F w/PMHx of HTN, HLD, DM, CVA with residual L sided weakness, breast CA s/p mastectomy, CAD on Plavix and ASA presents to ED with complaints of LUE weakness and vertigo since this AM.  Patient reports chronic vertigo and weakness and paresthesias to not only the LUE but the RUE as well.  She reports the LUE slightly worsened today and she dropped a cup of coffee which caused her concern and to come for evaluation  Patient also reports chronic vertigo since she was a child, exacerbates and remits  Reports its worth with head movement and position change.  Currently on Meclizine PRN.  No CP/SOB.  No abdominal or urinary complaints.  Patient with similar symptoms and admission 8/23 with negative workup.  Of note, patient hypertensive in ED, reports does not take BP meds.  No complaints of HA, CP, changes in vision.      #Vertigo with acute on chronic LUE weakness - still feels dizzy today 12/1 after sitting in chair - described both room spinning and off balance   #Hx of CVA - acute on chronic LUE weakness   - admit to SDU  - Neuro checks q4h  - c/w ASA/Plavix/Statin, increase Atorvastatin 80mg QHS per Neuro  - Neuro FU, MRI Brain pending   - ECHO w/bubble study 8/23 reviewed  - Lipids/A1C  in am  - PT/OT/SLP/PMR eval     #HTN Urgency  - was discharged on 5mg quinapril in August, not taking at home  - Permissive HTN approx 160 goal, will start Lisinopril 10mg PO QD with 1st dose and monitor  - Hydralaizine 10mg IVP q6h PRN with goal Bp of 160  - DASH Diet     #DM2  - Hold PO meds  - FS AC TID w/ISS, consider basal/bolus coverage if persistently hyperglycemic  - CHO diet    CAPILLARY BLOOD GLUCOSE      POCT Blood Glucose.: 126 mg/dL (02 Dec 2023 07:33)  POCT Blood Glucose.: 206 mg/dL (01 Dec 2023 21:30)  POCT Blood Glucose.: 193 mg/dL (01 Dec 2023 16:19)  POCT Blood Glucose.: 220 mg/dL (01 Dec 2023 11:32)      #Hx of CAD  - c/w ASA/Plavix/Statin    #Hx of Breast Ca s/p right mastectomy  - outpatient Onc FU        dispo - after PT, neuro eval, MRI  called family - listed Tara - no answer  follow up case mngmt

## 2023-12-02 NOTE — DISCHARGE NOTE PROVIDER - NSDCMRMEDTOKEN_GEN_ALL_CORE_FT
aspirin 81 mg oral tablet: 1 tab(s) orally once a day  atorvastatin 40 mg oral tablet: 1 orally once a day (at bedtime)  gabapentin 100 mg oral capsule: 1 cap(s) orally 3 times a day  glimepiride 4 mg oral tablet: 1 tab(s) orally 2 times a day  lisinopril 10 mg oral tablet: 1 tab(s) orally once a day  meclizine 25 mg oral tablet: 1 tab(s) orally 3 times a day as needed for  dizziness  metFORMIN 1000 mg oral tablet: 1 tab(s) orally 2 times a day  Plavix 75 mg oral tablet: 1 tab(s) orally once a day  polyethylene glycol 3350 oral powder for reconstitution: 17 gram(s) orally once a day  senna leaf extract oral tablet: 2 tab(s) orally once a day (at bedtime)   aspirin 81 mg oral tablet: 1 tab(s) orally once a day  atorvastatin 40 mg oral tablet: 1 orally once a day (at bedtime)  gabapentin 100 mg oral capsule: 1 cap(s) orally 3 times a day  glimepiride 4 mg oral tablet: 1 tab(s) orally 2 times a day  lisinopril 10 mg oral tablet: 1 tab(s) orally once a day  meclizine 25 mg oral tablet: 1 tab(s) orally 3 times a day as needed for  dizziness  metFORMIN 1000 mg oral tablet: 1 tab(s) orally 2 times a day  NIFEdipine 30 mg oral tablet, extended release: 1 tab(s) orally 2 times a day  Plavix 75 mg oral tablet: 1 tab(s) orally once a day  polyethylene glycol 3350 oral powder for reconstitution: 17 gram(s) orally once a day  senna leaf extract oral tablet: 2 tab(s) orally once a day (at bedtime)

## 2023-12-03 LAB
-  AMOXICILLIN/CLAVULANIC ACID: SIGNIFICANT CHANGE UP
-  AMOXICILLIN/CLAVULANIC ACID: SIGNIFICANT CHANGE UP
-  AMPICILLIN/SULBACTAM: SIGNIFICANT CHANGE UP
-  AMPICILLIN/SULBACTAM: SIGNIFICANT CHANGE UP
-  AMPICILLIN: SIGNIFICANT CHANGE UP
-  AMPICILLIN: SIGNIFICANT CHANGE UP
-  AZTREONAM: SIGNIFICANT CHANGE UP
-  AZTREONAM: SIGNIFICANT CHANGE UP
-  CEFAZOLIN: SIGNIFICANT CHANGE UP
-  CEFAZOLIN: SIGNIFICANT CHANGE UP
-  CEFEPIME: SIGNIFICANT CHANGE UP
-  CEFEPIME: SIGNIFICANT CHANGE UP
-  CEFOXITIN: SIGNIFICANT CHANGE UP
-  CEFOXITIN: SIGNIFICANT CHANGE UP
-  CEFTRIAXONE: SIGNIFICANT CHANGE UP
-  CEFTRIAXONE: SIGNIFICANT CHANGE UP
-  CEFUROXIME: SIGNIFICANT CHANGE UP
-  CEFUROXIME: SIGNIFICANT CHANGE UP
-  CIPROFLOXACIN: SIGNIFICANT CHANGE UP
-  CIPROFLOXACIN: SIGNIFICANT CHANGE UP
-  ERTAPENEM: SIGNIFICANT CHANGE UP
-  ERTAPENEM: SIGNIFICANT CHANGE UP
-  GENTAMICIN: SIGNIFICANT CHANGE UP
-  GENTAMICIN: SIGNIFICANT CHANGE UP
-  IMIPENEM: SIGNIFICANT CHANGE UP
-  IMIPENEM: SIGNIFICANT CHANGE UP
-  LEVOFLOXACIN: SIGNIFICANT CHANGE UP
-  LEVOFLOXACIN: SIGNIFICANT CHANGE UP
-  MEROPENEM: SIGNIFICANT CHANGE UP
-  MEROPENEM: SIGNIFICANT CHANGE UP
-  NITROFURANTOIN: SIGNIFICANT CHANGE UP
-  NITROFURANTOIN: SIGNIFICANT CHANGE UP
-  PIPERACILLIN/TAZOBACTAM: SIGNIFICANT CHANGE UP
-  PIPERACILLIN/TAZOBACTAM: SIGNIFICANT CHANGE UP
-  TOBRAMYCIN: SIGNIFICANT CHANGE UP
-  TOBRAMYCIN: SIGNIFICANT CHANGE UP
-  TRIMETHOPRIM/SULFAMETHOXAZOLE: SIGNIFICANT CHANGE UP
-  TRIMETHOPRIM/SULFAMETHOXAZOLE: SIGNIFICANT CHANGE UP
ALBUMIN SERPL ELPH-MCNC: 3.7 G/DL — SIGNIFICANT CHANGE UP (ref 3.5–5.2)
ALBUMIN SERPL ELPH-MCNC: 3.7 G/DL — SIGNIFICANT CHANGE UP (ref 3.5–5.2)
ALP SERPL-CCNC: 85 U/L — SIGNIFICANT CHANGE UP (ref 30–115)
ALP SERPL-CCNC: 85 U/L — SIGNIFICANT CHANGE UP (ref 30–115)
ALT FLD-CCNC: 13 U/L — SIGNIFICANT CHANGE UP (ref 0–41)
ALT FLD-CCNC: 13 U/L — SIGNIFICANT CHANGE UP (ref 0–41)
ANION GAP SERPL CALC-SCNC: 8 MMOL/L — SIGNIFICANT CHANGE UP (ref 7–14)
ANION GAP SERPL CALC-SCNC: 8 MMOL/L — SIGNIFICANT CHANGE UP (ref 7–14)
AST SERPL-CCNC: 16 U/L — SIGNIFICANT CHANGE UP (ref 0–41)
AST SERPL-CCNC: 16 U/L — SIGNIFICANT CHANGE UP (ref 0–41)
BASOPHILS # BLD AUTO: 0.03 K/UL — SIGNIFICANT CHANGE UP (ref 0–0.2)
BASOPHILS # BLD AUTO: 0.03 K/UL — SIGNIFICANT CHANGE UP (ref 0–0.2)
BASOPHILS NFR BLD AUTO: 0.4 % — SIGNIFICANT CHANGE UP (ref 0–1)
BASOPHILS NFR BLD AUTO: 0.4 % — SIGNIFICANT CHANGE UP (ref 0–1)
BILIRUB SERPL-MCNC: 0.2 MG/DL — SIGNIFICANT CHANGE UP (ref 0.2–1.2)
BILIRUB SERPL-MCNC: 0.2 MG/DL — SIGNIFICANT CHANGE UP (ref 0.2–1.2)
BUN SERPL-MCNC: 9 MG/DL — LOW (ref 10–20)
BUN SERPL-MCNC: 9 MG/DL — LOW (ref 10–20)
CALCIUM SERPL-MCNC: 9.1 MG/DL — SIGNIFICANT CHANGE UP (ref 8.4–10.5)
CALCIUM SERPL-MCNC: 9.1 MG/DL — SIGNIFICANT CHANGE UP (ref 8.4–10.5)
CHLORIDE SERPL-SCNC: 104 MMOL/L — SIGNIFICANT CHANGE UP (ref 98–110)
CHLORIDE SERPL-SCNC: 104 MMOL/L — SIGNIFICANT CHANGE UP (ref 98–110)
CO2 SERPL-SCNC: 28 MMOL/L — SIGNIFICANT CHANGE UP (ref 17–32)
CO2 SERPL-SCNC: 28 MMOL/L — SIGNIFICANT CHANGE UP (ref 17–32)
CREAT SERPL-MCNC: 0.7 MG/DL — SIGNIFICANT CHANGE UP (ref 0.7–1.5)
CREAT SERPL-MCNC: 0.7 MG/DL — SIGNIFICANT CHANGE UP (ref 0.7–1.5)
CULTURE RESULTS: ABNORMAL
CULTURE RESULTS: ABNORMAL
EGFR: 83 ML/MIN/1.73M2 — SIGNIFICANT CHANGE UP
EGFR: 83 ML/MIN/1.73M2 — SIGNIFICANT CHANGE UP
EOSINOPHIL # BLD AUTO: 0.06 K/UL — SIGNIFICANT CHANGE UP (ref 0–0.7)
EOSINOPHIL # BLD AUTO: 0.06 K/UL — SIGNIFICANT CHANGE UP (ref 0–0.7)
EOSINOPHIL NFR BLD AUTO: 0.8 % — SIGNIFICANT CHANGE UP (ref 0–8)
EOSINOPHIL NFR BLD AUTO: 0.8 % — SIGNIFICANT CHANGE UP (ref 0–8)
GLUCOSE BLDC GLUCOMTR-MCNC: 160 MG/DL — HIGH (ref 70–99)
GLUCOSE BLDC GLUCOMTR-MCNC: 160 MG/DL — HIGH (ref 70–99)
GLUCOSE BLDC GLUCOMTR-MCNC: 292 MG/DL — HIGH (ref 70–99)
GLUCOSE BLDC GLUCOMTR-MCNC: 292 MG/DL — HIGH (ref 70–99)
GLUCOSE BLDC GLUCOMTR-MCNC: 307 MG/DL — HIGH (ref 70–99)
GLUCOSE BLDC GLUCOMTR-MCNC: 307 MG/DL — HIGH (ref 70–99)
GLUCOSE BLDC GLUCOMTR-MCNC: 380 MG/DL — HIGH (ref 70–99)
GLUCOSE BLDC GLUCOMTR-MCNC: 408 MG/DL — HIGH (ref 70–99)
GLUCOSE BLDC GLUCOMTR-MCNC: 408 MG/DL — HIGH (ref 70–99)
GLUCOSE SERPL-MCNC: 146 MG/DL — HIGH (ref 70–99)
GLUCOSE SERPL-MCNC: 146 MG/DL — HIGH (ref 70–99)
HCT VFR BLD CALC: 33.7 % — LOW (ref 37–47)
HCT VFR BLD CALC: 33.7 % — LOW (ref 37–47)
HGB BLD-MCNC: 11 G/DL — LOW (ref 12–16)
HGB BLD-MCNC: 11 G/DL — LOW (ref 12–16)
IMM GRANULOCYTES NFR BLD AUTO: 0.4 % — HIGH (ref 0.1–0.3)
IMM GRANULOCYTES NFR BLD AUTO: 0.4 % — HIGH (ref 0.1–0.3)
LYMPHOCYTES # BLD AUTO: 1.67 K/UL — SIGNIFICANT CHANGE UP (ref 1.2–3.4)
LYMPHOCYTES # BLD AUTO: 1.67 K/UL — SIGNIFICANT CHANGE UP (ref 1.2–3.4)
LYMPHOCYTES # BLD AUTO: 23.6 % — SIGNIFICANT CHANGE UP (ref 20.5–51.1)
LYMPHOCYTES # BLD AUTO: 23.6 % — SIGNIFICANT CHANGE UP (ref 20.5–51.1)
MAGNESIUM SERPL-MCNC: 1.6 MG/DL — LOW (ref 1.8–2.4)
MAGNESIUM SERPL-MCNC: 1.6 MG/DL — LOW (ref 1.8–2.4)
MCHC RBC-ENTMCNC: 27.7 PG — SIGNIFICANT CHANGE UP (ref 27–31)
MCHC RBC-ENTMCNC: 27.7 PG — SIGNIFICANT CHANGE UP (ref 27–31)
MCHC RBC-ENTMCNC: 32.6 G/DL — SIGNIFICANT CHANGE UP (ref 32–37)
MCHC RBC-ENTMCNC: 32.6 G/DL — SIGNIFICANT CHANGE UP (ref 32–37)
MCV RBC AUTO: 84.9 FL — SIGNIFICANT CHANGE UP (ref 81–99)
MCV RBC AUTO: 84.9 FL — SIGNIFICANT CHANGE UP (ref 81–99)
METHOD TYPE: SIGNIFICANT CHANGE UP
METHOD TYPE: SIGNIFICANT CHANGE UP
MONOCYTES # BLD AUTO: 0.53 K/UL — SIGNIFICANT CHANGE UP (ref 0.1–0.6)
MONOCYTES # BLD AUTO: 0.53 K/UL — SIGNIFICANT CHANGE UP (ref 0.1–0.6)
MONOCYTES NFR BLD AUTO: 7.5 % — SIGNIFICANT CHANGE UP (ref 1.7–9.3)
MONOCYTES NFR BLD AUTO: 7.5 % — SIGNIFICANT CHANGE UP (ref 1.7–9.3)
NEUTROPHILS # BLD AUTO: 4.76 K/UL — SIGNIFICANT CHANGE UP (ref 1.4–6.5)
NEUTROPHILS # BLD AUTO: 4.76 K/UL — SIGNIFICANT CHANGE UP (ref 1.4–6.5)
NEUTROPHILS NFR BLD AUTO: 67.3 % — SIGNIFICANT CHANGE UP (ref 42.2–75.2)
NEUTROPHILS NFR BLD AUTO: 67.3 % — SIGNIFICANT CHANGE UP (ref 42.2–75.2)
NRBC # BLD: 0 /100 WBCS — SIGNIFICANT CHANGE UP (ref 0–0)
NRBC # BLD: 0 /100 WBCS — SIGNIFICANT CHANGE UP (ref 0–0)
ORGANISM # SPEC MICROSCOPIC CNT: ABNORMAL
ORGANISM # SPEC MICROSCOPIC CNT: ABNORMAL
ORGANISM # SPEC MICROSCOPIC CNT: SIGNIFICANT CHANGE UP
ORGANISM # SPEC MICROSCOPIC CNT: SIGNIFICANT CHANGE UP
PLATELET # BLD AUTO: 174 K/UL — SIGNIFICANT CHANGE UP (ref 130–400)
PLATELET # BLD AUTO: 174 K/UL — SIGNIFICANT CHANGE UP (ref 130–400)
PMV BLD: 8.6 FL — SIGNIFICANT CHANGE UP (ref 7.4–10.4)
PMV BLD: 8.6 FL — SIGNIFICANT CHANGE UP (ref 7.4–10.4)
POTASSIUM SERPL-MCNC: 4.7 MMOL/L — SIGNIFICANT CHANGE UP (ref 3.5–5)
POTASSIUM SERPL-MCNC: 4.7 MMOL/L — SIGNIFICANT CHANGE UP (ref 3.5–5)
POTASSIUM SERPL-SCNC: 4.7 MMOL/L — SIGNIFICANT CHANGE UP (ref 3.5–5)
POTASSIUM SERPL-SCNC: 4.7 MMOL/L — SIGNIFICANT CHANGE UP (ref 3.5–5)
PROT SERPL-MCNC: 6 G/DL — SIGNIFICANT CHANGE UP (ref 6–8)
PROT SERPL-MCNC: 6 G/DL — SIGNIFICANT CHANGE UP (ref 6–8)
RBC # BLD: 3.97 M/UL — LOW (ref 4.2–5.4)
RBC # BLD: 3.97 M/UL — LOW (ref 4.2–5.4)
RBC # FLD: 13.5 % — SIGNIFICANT CHANGE UP (ref 11.5–14.5)
RBC # FLD: 13.5 % — SIGNIFICANT CHANGE UP (ref 11.5–14.5)
SODIUM SERPL-SCNC: 140 MMOL/L — SIGNIFICANT CHANGE UP (ref 135–146)
SODIUM SERPL-SCNC: 140 MMOL/L — SIGNIFICANT CHANGE UP (ref 135–146)
SPECIMEN SOURCE: SIGNIFICANT CHANGE UP
SPECIMEN SOURCE: SIGNIFICANT CHANGE UP
WBC # BLD: 7.08 K/UL — SIGNIFICANT CHANGE UP (ref 4.8–10.8)
WBC # BLD: 7.08 K/UL — SIGNIFICANT CHANGE UP (ref 4.8–10.8)
WBC # FLD AUTO: 7.08 K/UL — SIGNIFICANT CHANGE UP (ref 4.8–10.8)
WBC # FLD AUTO: 7.08 K/UL — SIGNIFICANT CHANGE UP (ref 4.8–10.8)

## 2023-12-03 PROCEDURE — 99232 SBSQ HOSP IP/OBS MODERATE 35: CPT

## 2023-12-03 RX ORDER — MAGNESIUM SULFATE 500 MG/ML
2 VIAL (ML) INJECTION
Refills: 0 | Status: DISCONTINUED | OUTPATIENT
Start: 2023-12-03 | End: 2023-12-03

## 2023-12-03 RX ORDER — NIFEDIPINE 30 MG
30 TABLET, EXTENDED RELEASE 24 HR ORAL
Refills: 0 | Status: DISCONTINUED | OUTPATIENT
Start: 2023-12-03 | End: 2023-12-03

## 2023-12-03 RX ORDER — NIFEDIPINE 30 MG
30 TABLET, EXTENDED RELEASE 24 HR ORAL
Refills: 0 | Status: DISCONTINUED | OUTPATIENT
Start: 2023-12-03 | End: 2023-12-04

## 2023-12-03 RX ADMIN — Medication 4: at 17:33

## 2023-12-03 RX ADMIN — LISINOPRIL 10 MILLIGRAM(S): 2.5 TABLET ORAL at 05:48

## 2023-12-03 RX ADMIN — Medication 81 MILLIGRAM(S): at 11:41

## 2023-12-03 RX ADMIN — Medication 1: at 07:58

## 2023-12-03 RX ADMIN — SENNA PLUS 2 TABLET(S): 8.6 TABLET ORAL at 21:40

## 2023-12-03 RX ADMIN — ATORVASTATIN CALCIUM 80 MILLIGRAM(S): 80 TABLET, FILM COATED ORAL at 21:40

## 2023-12-03 RX ADMIN — CHLORHEXIDINE GLUCONATE 1 APPLICATION(S): 213 SOLUTION TOPICAL at 05:47

## 2023-12-03 RX ADMIN — Medication 5: at 11:41

## 2023-12-03 RX ADMIN — CLOPIDOGREL BISULFATE 75 MILLIGRAM(S): 75 TABLET, FILM COATED ORAL at 11:42

## 2023-12-03 RX ADMIN — Medication 25 GRAM(S): at 13:40

## 2023-12-03 RX ADMIN — ENOXAPARIN SODIUM 40 MILLIGRAM(S): 100 INJECTION SUBCUTANEOUS at 17:33

## 2023-12-03 RX ADMIN — GABAPENTIN 100 MILLIGRAM(S): 400 CAPSULE ORAL at 21:41

## 2023-12-03 RX ADMIN — Medication 30 MILLIGRAM(S): at 17:47

## 2023-12-03 RX ADMIN — GABAPENTIN 100 MILLIGRAM(S): 400 CAPSULE ORAL at 05:47

## 2023-12-03 RX ADMIN — Medication 25 GRAM(S): at 11:45

## 2023-12-03 NOTE — PROGRESS NOTE ADULT - ASSESSMENT
88 yr old female with hx of HTN, HLD, DM, CVA with residual L sided weakness, breast CA s/p mastectomy, CAD on Plavix and ASA presents to ED with complaints of LUE weakness and vertigo since this AM, admitted to SDU for further management.    # Acute CVA vs TIA  # Hx of CVA  # HTN Urgency - resolved  # hypomagnesemia   # Hx of CAD  # DM2  # Hx of Breast Ca s/p right mastectomy  - hemodynamically stable  - left arm numbness/weakness - resolved  - [facility] 0 currently  - Urine Cx (collected 30 Nov 2023): 10,000 - 49,000 CFU/mL Escherichia coli  - A1C 7.9  - MR Head: There is no evidence of acute intracranial pathology. No evidence of acute infarct, intracranial hemorrhage or mass effect. Chronic right cerebellar and right occipi, intact intra atrial septum, Moderate thickening and calcification of the posterior mitral valve leaflet, Moderate tricuspid regurgitation.  - c/w ASA, Plavix, lisinopril and high intensity Statin  - Mg replete   - start nifedipine XL 30mg q12  - c/w sliding scale insulin  - PT / OT following   - DASH Diet  - DVT ppx: Lovenox  - outpatient Onc FU  - downgrade to medical floor  - pending placement to STR

## 2023-12-04 ENCOUNTER — TRANSCRIPTION ENCOUNTER (OUTPATIENT)
Age: 88
End: 2023-12-04

## 2023-12-04 VITALS
OXYGEN SATURATION: 98 % | TEMPERATURE: 96 F | DIASTOLIC BLOOD PRESSURE: 57 MMHG | SYSTOLIC BLOOD PRESSURE: 114 MMHG | RESPIRATION RATE: 18 BRPM | HEART RATE: 73 BPM

## 2023-12-04 LAB
GLUCOSE BLDC GLUCOMTR-MCNC: 243 MG/DL — HIGH (ref 70–99)
GLUCOSE BLDC GLUCOMTR-MCNC: 243 MG/DL — HIGH (ref 70–99)
GLUCOSE BLDC GLUCOMTR-MCNC: 314 MG/DL — HIGH (ref 70–99)
GLUCOSE BLDC GLUCOMTR-MCNC: 314 MG/DL — HIGH (ref 70–99)

## 2023-12-04 PROCEDURE — 99239 HOSP IP/OBS DSCHRG MGMT >30: CPT

## 2023-12-04 RX ORDER — LISINOPRIL 2.5 MG/1
1 TABLET ORAL
Qty: 30 | Refills: 0
Start: 2023-12-04 | End: 2024-01-02

## 2023-12-04 RX ORDER — NIFEDIPINE 30 MG
1 TABLET, EXTENDED RELEASE 24 HR ORAL
Qty: 60 | Refills: 0
Start: 2023-12-04 | End: 2024-01-02

## 2023-12-04 RX ADMIN — GABAPENTIN 100 MILLIGRAM(S): 400 CAPSULE ORAL at 05:33

## 2023-12-04 RX ADMIN — Medication 81 MILLIGRAM(S): at 12:36

## 2023-12-04 RX ADMIN — Medication 30 MILLIGRAM(S): at 05:35

## 2023-12-04 RX ADMIN — LISINOPRIL 10 MILLIGRAM(S): 2.5 TABLET ORAL at 05:34

## 2023-12-04 RX ADMIN — Medication 2: at 08:14

## 2023-12-04 RX ADMIN — CLOPIDOGREL BISULFATE 75 MILLIGRAM(S): 75 TABLET, FILM COATED ORAL at 12:36

## 2023-12-04 RX ADMIN — Medication 4: at 12:35

## 2023-12-04 NOTE — PROGRESS NOTE ADULT - REASON FOR ADMISSION
Vertigo and LUE weakness x 1 day

## 2023-12-04 NOTE — PROGRESS NOTE ADULT - SUBJECTIVE AND OBJECTIVE BOX
Patient seen and evaluated this am, comfortable in bed, no complaints       T(F): 98.7 (12-04-23 @ 04:15), Max: 98.7 (12-04-23 @ 04:15)  HR: 75 (12-04-23 @ 04:15)  BP: 129/55 (12-04-23 @ 04:15)  RR: 18 (12-04-23 @ 04:15)  SpO2: 97% (12-03-23 @ 14:27) (97% - 97%)    PHYSICAL EXAM:  GENERAL: NAD  HEAD:  Atraumatic, Normocephalic  EYES: EOMI, PERRLA, conjunctiva and sclera clear  NERVOUS SYSTEM:  Alert & Oriented X3, no focal deficits   CHEST/LUNG: Clear to percussion bilaterally; No rales, rhonchi, wheezing, or rubs  HEART: Regular rate and rhythm; No murmurs, rubs, or gallops  ABDOMEN: Soft, Nontender, Nondistended; Bowel sounds present  EXTREMITIES:  2+ Peripheral Pulses, No clubbing, cyanosis, or edema    LABS  12-03    140  |  104  |  9<L>  ----------------------------<  146<H>  4.7   |  28  |  0.7    Ca    9.1      03 Dec 2023 05:33  Mg     1.6     12-03    TPro  6.0  /  Alb  3.7  /  TBili  0.2  /  DBili  x   /  AST  16  /  ALT  13  /  AlkPhos  85  12-03                          11.0   7.08  )-----------( 174      ( 03 Dec 2023 05:33 )             33.7   < from: 12 Lead ECG (11.30.23 @ 12:59) >  Diagnosis Line Normal sinus rhythm    < end of copied text >  < from: TTE Echo Complete w/ Contrast w/ Doppler (07.31.23 @ 12:26) >  Summary:   1. Normal global left ventricular systolic function.   2. LV Ejection Fraction by Potter's Method with a biplane EF of 67 %.   3. Normal right ventricular size and function.   4. Normal left atrial size.   5. Normal right atrial size.   6. Color flow doppler and intravenous injection of agitated saline   demonstrates the presence of an intact intra atrial septum.   7. There is mild aortic root calcification.   8. Mild to moderate mitral annular calcification.   9. Moderate thickening and calcification of the posterior mitral valve   leaflet.  10. Trace mitral valve regurgitation.  11. Moderate tricuspid regurgitation.  12. Normal pulmonary artery pressure.  13. There is no evidence of pericardial effusion.    < end of copied text >        Culture Results:   10,000 - 49,000 CFU/mL Escherichia coli  10,000 - 49,000 CFU/mL Aerococcus species  "Aerococcus spp. are predictably susceptible to penicillin,  ampicillin, tetracycline, and vancomycin.  All isolates are  resistant to sulfonamides" (11-30-23)  Culture Results:   No growth at 72 Hours (11-30-23)  Culture Results:   No growth at 72 Hours (11-30-23)    RADIOLOGY  < from: MR Head No Cont (12.02.23 @ 11:48) >  IMPRESSION:  There is no evidence of acute intracranial pathology. No evidence of   acute infarct, intracranial hemorrhage or mass effect.    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    < end of copied text >    MEDICATIONS  (STANDING):  aspirin  chewable 81 milliGRAM(s) Oral daily  atorvastatin 80 milliGRAM(s) Oral at bedtime  chlorhexidine 2% Cloths 1 Application(s) Topical <User Schedule>  clopidogrel Tablet 75 milliGRAM(s) Oral daily  enoxaparin Injectable 40 milliGRAM(s) SubCutaneous every 24 hours  gabapentin 100 milliGRAM(s) Oral three times a day  insulin lispro (ADMELOG) corrective regimen sliding scale   SubCutaneous three times a day before meals  lisinopril 10 milliGRAM(s) Oral daily  NIFEdipine XL 30 milliGRAM(s) Oral two times a day  senna 2 Tablet(s) Oral at bedtime    MEDICATIONS  (PRN):  dextrose Oral Gel 15 Gram(s) Oral once PRN Blood Glucose LESS THAN 70 milliGRAM(s)/deciliter  hydrALAZINE 10 milliGRAM(s) Oral every 6 hours PRN Systolic blood pressure >180  meclizine 25 milliGRAM(s) Oral three times a day PRN for dizziness  polyethylene glycol 3350 17 Gram(s) Oral daily PRN Constipation    
    EDENVAN  88y, Female  Allergy: codeine (Hives; Urticaria)      CHIEF COMPLAINT: Vertigo and LUE weakness x 1 day (01 Dec 2023 11:04)      HPI:  88F w/PMHx of HTN, HLD, DM, CVA with residual L sided weakness, breast CA s/p mastectomy, CAD on Plavix and ASA presents to ED with complaints of LUE weakness and vertigo since this AM.  Patient reports chronic vertigo and weakness and paresthesias to not only the LUE but the RUE as well.  She reports the LUE slightly worsened today and she dropped a cup of coffee which caused her concern and to come for evaluation  Patient also reports chronic vertigo since she was a child, exacerbates and remits  Reports its worth with head movement and position change.  Currently on Meclizine PRN.  No CP/SOB.  No abdominal or urinary complaints.  Patient with similar symptoms and admission 8/23 with negative workup.  Of note, patient hypertensive in ED, reports does not take BP meds.  No complaints of HA, CP, changes in vision.   (30 Nov 2023 15:24)    HPI:    FAMILY HISTORY:  Family history of stroke (Mother)      PAST MEDICAL & SURGICAL HISTORY:  DM (diabetes mellitus)      HTN (hypertension)      Vertigo      CVA (cerebral vascular accident)      Breast cancer      H/O mastectomy  right          SOCIAL HISTORY  Social History:  CIG: ex smoker   Etoh: occasional   Drus; denies (28 Jul 2023 15:29)      Home Medications:  atorvastatin 40 mg oral tablet: 1 orally once a day (at bedtime) (30 Nov 2023 15:30)  gabapentin 100 mg oral capsule: 1 cap(s) orally 3 times a day (30 Nov 2023 15:49)  glimepiride 4 mg oral tablet: 1 tab(s) orally 2 times a day (30 Nov 2023 15:29)  metFORMIN 1000 mg oral tablet: 1 tab(s) orally 2 times a day (30 Nov 2023 15:49)  polyethylene glycol 3350 oral powder for reconstitution: 17 gram(s) orally once a day (30 Nov 2023 15:30)  senna leaf extract oral tablet: 2 tab(s) orally once a day (at bedtime) (30 Nov 2023 15:30)      ROS  General: Denies fevers, chills, nightsweats, weight loss  HEENT: Denies headache, rhinorrhea, sore throat, eye pain  CV: Denies CP, palpitations  PULM: Denies SOB, cough  GI: Denies abdominal pain, diarrhea  : Denies dysuria, hematuria  MSK: Denies arthralgias  SKIN: Denies rash   NEURO: Denies paresthesias, weakness  PSYCH: Denies depression    VITALS:  ICU Vital Signs Last 24 Hrs  T(C): 36.4 (02 Dec 2023 07:10), Max: 36.6 (01 Dec 2023 23:02)  T(F): 97.5 (02 Dec 2023 07:10), Max: 97.8 (01 Dec 2023 23:02)  HR: 70 (02 Dec 2023 09:31) (58 - 70)  BP: 130/73 (02 Dec 2023 09:31) (97/47 - 142/79)  BP(mean): 104 (02 Dec 2023 07:41) (68 - 104)  ABP: --  ABP(mean): --  RR: 20 (02 Dec 2023 09:31) (15 - 33)  SpO2: 94% (02 Dec 2023 09:31) (94% - 100%)    O2 Parameters below as of 02 Dec 2023 09:31  Patient On (Oxygen Delivery Method): room air            PHYSICAL EXAM:  Gen: NAD, resting in bed  HEENT: Normocephalic, atraumatic  Neck: supple, no lymphadenopathy  CV: Regular rate & regular rhythm  Lungs: CTABL no wheeze  Abdomen: Soft, NTND+ BS present  Ext: Warm, well perfused no CCE  Neuro: non focal, awake, CN II-XII intact ,  LUE weakness 3/5  Skin: no rash, no erythema  Psych: no SI, HI, Hallucination     TESTS & MEASUREMENTS:                        10.8   6.10  )-----------( 174      ( 01 Dec 2023 05:37 )             33.1     12-01    141  |  104  |  15  ----------------------------<  202<H>  4.5   |  27  |  1.0    Ca    9.5      01 Dec 2023 05:37    TPro  5.6<L>  /  Alb  3.7  /  TBili  <0.2  /  DBili  x   /  AST  14  /  ALT  10  /  AlkPhos  75  12-01      LIVER FUNCTIONS - ( 01 Dec 2023 05:37 )  Alb: 3.7 g/dL / Pro: 5.6 g/dL / ALK PHOS: 75 U/L / ALT: 10 U/L / AST: 14 U/L / GGT: x           Urinalysis Basic - ( 01 Dec 2023 05:37 )    Color: x / Appearance: x / SG: x / pH: x  Gluc: 202 mg/dL / Ketone: x  / Bili: x / Urobili: x   Blood: x / Protein: x / Nitrite: x   Leuk Esterase: x / RBC: x / WBC x   Sq Epi: x / Non Sq Epi: x / Bacteria: x            QRS axis to [] ° and NSR at a rate of [] BPM. There was no atrial enlargement. There was no ventricular hypertrophy. There were no ST-T changes and all intervals were normal.      INFECTIOUS DISEASES TESTING      RADIOLOGY & ADDITIONAL TESTS:  I have personally reviewed the last Chest xray  CXR  Xray Chest 1 View- PORTABLE-Urgent:   ACC: [id number] EXAM:  XR CHEST PORTABLE URGENT 1V   ORDERED BY: ADELFO RECIO     PROCEDURE DATE:  11/30/2023          INTERPRETATION:  Clinical History / Reason for exam: Stroke code    Comparison : Chest radiograph 10/1/2022.    Technique/Positioning: Frontal radiograph.    Findings:    Support devices: None.    Cardiac/mediastinum/hilum: Unremarkable.    Lung parenchyma/Pleura: No focal consolidation, effusion or pneumothorax.    Skeleton/soft tissues: Osseous degenerative changes. Scoliosis.    Impression:    No radiographic evidence of acute cardiopulmonary disease.        --- End of Report ---          COLTON LORENZANA MD; Resident Radiologist  This document has been electronically signed.  MILDRED PARNELL MD; Attending Radiologist  This document has been electronically signed. Nov 30 2023  8:19PM (11-30-23 @ 13:31)      CT      CARDIOLOGY TESTING  12 Lead ECG:   Ventricular Rate 92 BPM    Atrial Rate 92 BPM    P-R Interval 190 ms    QRS Duration 76 ms    Q-T Interval 350 ms    QTC Calculation(Bazett) 432 ms    P Axis 115 degrees    R Axis 72 degrees    T Axis 39 degrees    Diagnosis Line Normal sinus rhythm  Nonspecific ST and T wave abnormality  Abnormal ECG    Confirmed by ATUL ARAUZ MD ([id number]) on 11/30/2023 6:53:59 PM (11-30-23 @ 12:59)      MEDICATIONS  (STANDING):  aspirin  chewable 81 milliGRAM(s) Oral daily  atorvastatin 80 milliGRAM(s) Oral at bedtime  chlorhexidine 2% Cloths 1 Application(s) Topical <User Schedule>  clopidogrel Tablet 75 milliGRAM(s) Oral daily  dextrose 5%. 1000 milliLiter(s) (50 mL/Hr) IV Continuous <Continuous>  dextrose 5%. 1000 milliLiter(s) (100 mL/Hr) IV Continuous <Continuous>  dextrose 50% Injectable 25 Gram(s) IV Push once  dextrose 50% Injectable 25 Gram(s) IV Push once  dextrose 50% Injectable 12.5 Gram(s) IV Push once  enoxaparin Injectable 40 milliGRAM(s) SubCutaneous every 24 hours  gabapentin 100 milliGRAM(s) Oral three times a day  glucagon  Injectable 1 milliGRAM(s) IntraMuscular once  influenza  Vaccine (HIGH DOSE) 0.7 milliLiter(s) IntraMuscular once  insulin lispro (ADMELOG) corrective regimen sliding scale   SubCutaneous three times a day before meals  lisinopril 10 milliGRAM(s) Oral daily  senna 2 Tablet(s) Oral at bedtime    MEDICATIONS  (PRN):  dextrose Oral Gel 15 Gram(s) Oral once PRN Blood Glucose LESS THAN 70 milliGRAM(s)/deciliter  hydrALAZINE 10 milliGRAM(s) Oral every 6 hours PRN Systolic blood pressure >180  meclizine 25 milliGRAM(s) Oral three times a day PRN for dizziness  polyethylene glycol 3350 17 Gram(s) Oral daily PRN Constipation      ANTIBIOTICS:      All available historical data has been reviewed    ASSESSMENT  88y F admitted with Dizziness and giddiness        PROBLEMS  
    EDENVAN  88y, Female  Allergy: codeine (Hives; Urticaria)      CHIEF COMPLAINT: Vertigo and LUE weakness x 1 day (01 Dec 2023 11:04)      HPI:  88F w/PMHx of HTN, HLD, DM, CVA with residual L sided weakness, breast CA s/p mastectomy, CAD on Plavix and ASA presents to ED with complaints of LUE weakness and vertigo since this AM.  Patient reports chronic vertigo and weakness and paresthesias to not only the LUE but the RUE as well.  She reports the LUE slightly worsened today and she dropped a cup of coffee which caused her concern and to come for evaluation  Patient also reports chronic vertigo since she was a child, exacerbates and remits  Reports its worth with head movement and position change.  Currently on Meclizine PRN.  No CP/SOB.  No abdominal or urinary complaints.  Patient with similar symptoms and admission 8/23 with negative workup.  Of note, patient hypertensive in ED, reports does not take BP meds.  No complaints of HA, CP, changes in vision.   (30 Nov 2023 15:24)    HPI:    FAMILY HISTORY:  Family history of stroke (Mother)      PAST MEDICAL & SURGICAL HISTORY:  DM (diabetes mellitus)      HTN (hypertension)      Vertigo      CVA (cerebral vascular accident)      Breast cancer      H/O mastectomy  right          SOCIAL HISTORY  Social History:  CIG: ex smoker   Etoh: occasional   Drus; denies (28 Jul 2023 15:29)      Home Medications:  atorvastatin 40 mg oral tablet: 1 orally once a day (at bedtime) (30 Nov 2023 15:30)  gabapentin 100 mg oral capsule: 1 cap(s) orally 3 times a day (30 Nov 2023 15:49)  glimepiride 4 mg oral tablet: 1 tab(s) orally 2 times a day (30 Nov 2023 15:29)  metFORMIN 1000 mg oral tablet: 1 tab(s) orally 2 times a day (30 Nov 2023 15:49)  polyethylene glycol 3350 oral powder for reconstitution: 17 gram(s) orally once a day (30 Nov 2023 15:30)  senna leaf extract oral tablet: 2 tab(s) orally once a day (at bedtime) (30 Nov 2023 15:30)      ROS  General: Denies fevers, chills, nightsweats, weight loss  HEENT: Denies headache, rhinorrhea, sore throat, eye pain  CV: Denies CP, palpitations  PULM: Denies SOB, cough  GI: Denies abdominal pain, diarrhea  : Denies dysuria, hematuria  MSK: Denies arthralgias  SKIN: Denies rash   NEURO: Denies paresthesias, weakness  PSYCH: Denies depression    VITALS:  T(F): 97.3, Max: 98.7 (11-30-23 @ 13:20)  HR: 54  BP: 103/49  RR: 17Vital Signs Last 24 Hrs  T(C): 36.3 (01 Dec 2023 07:10), Max: 37.1 (30 Nov 2023 13:20)  T(F): 97.3 (01 Dec 2023 07:10), Max: 98.7 (30 Nov 2023 13:20)  HR: 54 (01 Dec 2023 07:05) (54 - 101)  BP: 103/49 (01 Dec 2023 07:05) (94/50 - 184/74)  BP(mean): 70 (01 Dec 2023 07:05) (63 - 107)  RR: 17 (01 Dec 2023 07:10) (16 - 24)  SpO2: 97% (01 Dec 2023 07:05) (95% - 98%)    Parameters below as of 30 Nov 2023 14:20  Patient On (Oxygen Delivery Method): room air        PHYSICAL EXAM:  Gen: NAD, resting in bed  HEENT: Normocephalic, atraumatic  Neck: supple, no lymphadenopathy  CV: Regular rate & regular rhythm  Lungs: CTABL no wheeze  Abdomen: Soft, NTND+ BS present  Ext: Warm, well perfused no CCE  Neuro: non focal, awake, CN II-XII intact ,  LUE weakness 3/5  Skin: no rash, no erythema  Psych: no SI, HI, Hallucination     TESTS & MEASUREMENTS:                        10.8   6.10  )-----------( 174      ( 01 Dec 2023 05:37 )             33.1     12-01    141  |  104  |  15  ----------------------------<  202<H>  4.5   |  27  |  1.0    Ca    9.5      01 Dec 2023 05:37    TPro  5.6<L>  /  Alb  3.7  /  TBili  <0.2  /  DBili  x   /  AST  14  /  ALT  10  /  AlkPhos  75  12-01      LIVER FUNCTIONS - ( 01 Dec 2023 05:37 )  Alb: 3.7 g/dL / Pro: 5.6 g/dL / ALK PHOS: 75 U/L / ALT: 10 U/L / AST: 14 U/L / GGT: x           Urinalysis Basic - ( 01 Dec 2023 05:37 )    Color: x / Appearance: x / SG: x / pH: x  Gluc: 202 mg/dL / Ketone: x  / Bili: x / Urobili: x   Blood: x / Protein: x / Nitrite: x   Leuk Esterase: x / RBC: x / WBC x   Sq Epi: x / Non Sq Epi: x / Bacteria: x            QRS axis to [] ° and NSR at a rate of [] BPM. There was no atrial enlargement. There was no ventricular hypertrophy. There were no ST-T changes and all intervals were normal.      INFECTIOUS DISEASES TESTING      RADIOLOGY & ADDITIONAL TESTS:  I have personally reviewed the last Chest xray  CXR  Xray Chest 1 View- PORTABLE-Urgent:   ACC: [id number] EXAM:  XR CHEST PORTABLE URGENT 1V   ORDERED BY: ADELFO RECIO     PROCEDURE DATE:  11/30/2023          INTERPRETATION:  Clinical History / Reason for exam: Stroke code    Comparison : Chest radiograph 10/1/2022.    Technique/Positioning: Frontal radiograph.    Findings:    Support devices: None.    Cardiac/mediastinum/hilum: Unremarkable.    Lung parenchyma/Pleura: No focal consolidation, effusion or pneumothorax.    Skeleton/soft tissues: Osseous degenerative changes. Scoliosis.    Impression:    No radiographic evidence of acute cardiopulmonary disease.        --- End of Report ---          COLTON LORENZANA MD; Resident Radiologist  This document has been electronically signed.  MILDRED PARNELL MD; Attending Radiologist  This document has been electronically signed. Nov 30 2023  8:19PM (11-30-23 @ 13:31)      CT      CARDIOLOGY TESTING  12 Lead ECG:   Ventricular Rate 92 BPM    Atrial Rate 92 BPM    P-R Interval 190 ms    QRS Duration 76 ms    Q-T Interval 350 ms    QTC Calculation(Bazett) 432 ms    P Axis 115 degrees    R Axis 72 degrees    T Axis 39 degrees    Diagnosis Line Normal sinus rhythm  Nonspecific ST and T wave abnormality  Abnormal ECG    Confirmed by ATUL ARAUZ MD ([id number]) on 11/30/2023 6:53:59 PM (11-30-23 @ 12:59)      MEDICATIONS  (STANDING):  aspirin  chewable 81 milliGRAM(s) Oral daily  atorvastatin 80 milliGRAM(s) Oral at bedtime  chlorhexidine 2% Cloths 1 Application(s) Topical <User Schedule>  clopidogrel Tablet 75 milliGRAM(s) Oral daily  dextrose 5%. 1000 milliLiter(s) (50 mL/Hr) IV Continuous <Continuous>  dextrose 5%. 1000 milliLiter(s) (100 mL/Hr) IV Continuous <Continuous>  dextrose 50% Injectable 25 Gram(s) IV Push once  dextrose 50% Injectable 25 Gram(s) IV Push once  dextrose 50% Injectable 12.5 Gram(s) IV Push once  enoxaparin Injectable 40 milliGRAM(s) SubCutaneous every 24 hours  gabapentin 100 milliGRAM(s) Oral three times a day  glucagon  Injectable 1 milliGRAM(s) IntraMuscular once  influenza  Vaccine (HIGH DOSE) 0.7 milliLiter(s) IntraMuscular once  insulin lispro (ADMELOG) corrective regimen sliding scale   SubCutaneous three times a day before meals  lisinopril 10 milliGRAM(s) Oral daily  senna 2 Tablet(s) Oral at bedtime    MEDICATIONS  (PRN):  dextrose Oral Gel 15 Gram(s) Oral once PRN Blood Glucose LESS THAN 70 milliGRAM(s)/deciliter  hydrALAZINE 10 milliGRAM(s) Oral every 6 hours PRN Systolic blood pressure >180  meclizine 25 milliGRAM(s) Oral three times a day PRN for dizziness  polyethylene glycol 3350 17 Gram(s) Oral daily PRN Constipation      ANTIBIOTICS:      All available historical data has been reviewed    ASSESSMENT  88y F admitted with Dizziness and giddiness        PROBLEMS  
Hospital day # : 1d  Events Overnight: none    Subjective: no new complaints    Physical Exam:  General: no acute distress  Head: atraumatic, normocephalic  Eyes: EOMI, no icterus  Lungs/Chest: Clear to auscultation bilaterally, no wheeze  Heart: regular rate, regular rhythm, S1/S2  Abdomen: BS audible, non-distended, soft, non-tender  Extremities: no clubbing, no cyanosis, no edema  Neuro: alert, oriented x3, speech clear and fluent, strength 4-5 all ext, sensation grossly intact all ext    Recent data:  T(C): 36.3 (12-01-23 @ 07:10), Max: 37.3 (11-30-23 @ 12:20)  HR: 54 (12-01-23 @ 07:05) (54 - 101)  BP: 103/49 (12-01-23 @ 07:05) (94/50 - 215/84)  RR: 17 (12-01-23 @ 07:10) (16 - 24)  SpO2: 97% (12-01-23 @ 07:05) (95% - 98%)    I&O's Summary    30 Nov 2023 07:01  -  01 Dec 2023 07:00  --------------------------------------------------------  IN: 0 mL / OUT: 500 mL / NET: -500 mL                              10.8   6.10  )-----------( 174      ( 01 Dec 2023 05:37 )             33.1                         12.1   8.29  )-----------( 191      ( 30 Nov 2023 13:00 )             35.7       01 Dec 2023 05:37    141    |  104    |  15     ----------------------------<  202    4.5     |  27     |  1.0    30 Nov 2023 13:00    141    |  102    |  15     ----------------------------<  141    4.1     |  25     |  0.9       Ca    9.5        01 Dec 2023 05:37  Ca    10.2       30 Nov 2023 13:00        Creatine Kinase        Assessment/Plan:  88F w/PMHx of HTN, HLD, DM, CVA with residual L sided weakness, breast CA s/p mastectomy, CAD on Plavix and ASA presents to ED with complaints of LUE weakness and vertigo since this AM, admitted to SDU for further management.    #Vertigo with acute on chronic LUE weakness  #Hx of CVA  - admit to SDU  - Neuro checks q4h  - c/w ASA/Plavix/Statin, increase Atorvastatin 80mg QHS per Neuro  - Neuro FU, MRI Brain  - ECHO w/bubble study 8/23 reviewed  - Lipids/A1C    - passed swallow eval approved for diet  - continue PT/OT / PMR - recommend SNF for STR    #HTN  - allow permissive HTN - parameters as per neuro  -continue Lisinopril 10mg QD and Hydralazine q6h prn if SBP< 180  - DASH Diet     #DM2  - Hold PO meds  - FS AC TID w/ISS, consider basal/bolus coverage if persistently hyperglycemic  - CHO diet    #Hx of CAD  - c/w ASA/Plavix/Statin    #Hx of Breast Ca s/p right mastectomy  - outpatient Onc FU    Pending - MRI, neuro f/u  Disposition - SNF for STR  
SUBJECTIVE:    Patient is a 88y old Female who presents with a chief complaint of Vertigo and LUE weakness x 1 day (02 Dec 2023 14:36)    Currently admitted to medicine with the primary diagnosis of Dizziness     Today is hospital day 3d. This morning she is resting in bed and reports no new issues or overnight events.     PAST MEDICAL & SURGICAL HISTORY  DM (diabetes mellitus)    HTN (hypertension)    Vertigo    CVA (cerebral vascular accident)    Breast cancer    H/O mastectomy  right      SOCIAL HISTORY:  Negative for smoking/alcohol/drug use.     ALLERGIES:  codeine (Hives; Urticaria)    MEDICATIONS:  STANDING MEDICATIONS  aspirin  chewable 81 milliGRAM(s) Oral daily  atorvastatin 80 milliGRAM(s) Oral at bedtime  chlorhexidine 2% Cloths 1 Application(s) Topical <User Schedule>  clopidogrel Tablet 75 milliGRAM(s) Oral daily  dextrose 5%. 1000 milliLiter(s) IV Continuous <Continuous>  dextrose 5%. 1000 milliLiter(s) IV Continuous <Continuous>  dextrose 50% Injectable 25 Gram(s) IV Push once  dextrose 50% Injectable 12.5 Gram(s) IV Push once  dextrose 50% Injectable 25 Gram(s) IV Push once  enoxaparin Injectable 40 milliGRAM(s) SubCutaneous every 24 hours  gabapentin 100 milliGRAM(s) Oral three times a day  glucagon  Injectable 1 milliGRAM(s) IntraMuscular once  influenza  Vaccine (HIGH DOSE) 0.7 milliLiter(s) IntraMuscular once  insulin lispro (ADMELOG) corrective regimen sliding scale   SubCutaneous three times a day before meals  lisinopril 10 milliGRAM(s) Oral daily  senna 2 Tablet(s) Oral at bedtime    PRN MEDICATIONS  dextrose Oral Gel 15 Gram(s) Oral once PRN  hydrALAZINE 10 milliGRAM(s) Oral every 6 hours PRN  meclizine 25 milliGRAM(s) Oral three times a day PRN  polyethylene glycol 3350 17 Gram(s) Oral daily PRN    VITALS:   T(F): 97.8  HR: 63  BP: 150/59  RR: 26  SpO2: --    LABS:                        11.0   7.08  )-----------( 174      ( 03 Dec 2023 05:33 )             33.7     12-03    140  |  104  |  9<L>  ----------------------------<  146<H>  4.7   |  28  |  0.7    Ca    9.1      03 Dec 2023 05:33  Mg     1.6     12-03    TPro  6.0  /  Alb  3.7  /  TBili  0.2  /  DBili  x   /  AST  16  /  ALT  13  /  AlkPhos  85  12-03      Urinalysis Basic - ( 03 Dec 2023 05:33 )    Color: x / Appearance: x / SG: x / pH: x  Gluc: 146 mg/dL / Ketone: x  / Bili: x / Urobili: x   Blood: x / Protein: x / Nitrite: x   Leuk Esterase: x / RBC: x / WBC x   Sq Epi: x / Non Sq Epi: x / Bacteria: x      Culture - Urine (collected 30 Nov 2023 14:10)  Source: Clean Catch Clean Catch (Midstream)  Preliminary Report (02 Dec 2023 14:18):    10,000 - 49,000 CFU/mL Escherichia coli    10,000 - 49,000 CFU/mL Aerococcus species    "Aerococcus spp. are predictably susceptible to penicillin,    ampicillin, tetracycline, and vancomycin.  All isolates are    resistant to sulfonamides"    Culture - Blood (collected 30 Nov 2023 13:00)  Source: .Blood Blood  Preliminary Report (02 Dec 2023 23:01):    No growth at 48 Hours    Culture - Blood (collected 30 Nov 2023 13:00)  Source: .Blood Blood  Preliminary Report (02 Dec 2023 23:01):    No growth at 48 Hours      RADIOLOGY:   MR Head: There is no evidence of acute intracranial pathology. No evidence of acute infarct, intracranial hemorrhage or mass effect. Chronic right cerebellar and right occipi, intact intra atrial septum, Moderate thickening and calcification of the posterior mitral valve leaflet, Moderate tricuspid regurgitation.    PHYSICAL EXAM:  Gen: NAD, resting in bed  HEENT: Normocephalic, atraumatic  Neck: supple, no lymphadenopathy  CV: Regular rate & regular rhythm  Lungs: CTABL no wheeze  Abdomen: Soft, NTND+ BS present  Ext: Warm, well perfused no CCE  Neuro: non focal, awake, CN II-XII intact  Skin: no rash, no erythema  Psych: no SI, HI, Hallucination

## 2023-12-04 NOTE — DISCHARGE NOTE NURSING/CASE MANAGEMENT/SOCIAL WORK - PATIENT PORTAL LINK FT
You can access the FollowMyHealth Patient Portal offered by [facility] by registering at the following website: [email or web address]. By joining [employer]’s FollowMyHealth portal, you will also be able to view your health information using other applications (apps) compatible with our system. You can access the FollowMyHealth Patient Portal offered by [facility] by registering at the following website: [email or web address]. By joining [employer]’s FollowMyHealth portal, you will also be able to view your health information using other applications (apps) compatible with our system.

## 2023-12-04 NOTE — DISCHARGE NOTE NURSING/CASE MANAGEMENT/SOCIAL WORK - NSDCPEFALRISK_GEN_ALL_CORE
For information on Fall & Injury Prevention, visit: [email or web address] OR  [email or web address] OR  https://www.cdc.gov/steadi/patient.html For information on Fall & Injury Prevention, visit: [email or web address] OR  [email or web address] OR  https://www.cdc.gov/steadi/patient.html

## 2023-12-04 NOTE — PROGRESS NOTE ADULT - ASSESSMENT
88 year old F with a medical history of  HTN, HLD, DM, CVA with residual L sided weakness, breast CA s/p mastectomy and  CAD presents to ED with complaints of a severe episode of dizziness associated with dropping her coffee and toast this morning. Pt reports this is similar to many episodes that have occurred in past.     dizziness episode with weakness probably secondary to old stroke         - continue aspirin, Plavix and Lipitor   - blood pressure now better controlled on lisinopril and Nifedipine   - MRI brain was negative for acute stroke   - medically stable for  DC to STR vs home    - 33min spent on DC

## 2023-12-05 LAB
CULTURE RESULTS: SIGNIFICANT CHANGE UP
SPECIMEN SOURCE: SIGNIFICANT CHANGE UP

## 2023-12-07 DIAGNOSIS — I25.10 ATHEROSCLEROTIC HEART DISEASE OF NATIVE CORONARY ARTERY WITHOUT ANGINA PECTORIS: ICD-10-CM

## 2023-12-07 DIAGNOSIS — Z79.02 LONG TERM (CURRENT) USE OF ANTITHROMBOTICS/ANTIPLATELETS: ICD-10-CM

## 2023-12-07 DIAGNOSIS — R29.701 NIHSS SCORE 1: ICD-10-CM

## 2023-12-07 DIAGNOSIS — E78.5 HYPERLIPIDEMIA, UNSPECIFIED: ICD-10-CM

## 2023-12-07 DIAGNOSIS — I10 ESSENTIAL (PRIMARY) HYPERTENSION: ICD-10-CM

## 2023-12-07 DIAGNOSIS — E11.9 TYPE 2 DIABETES MELLITUS WITHOUT COMPLICATIONS: ICD-10-CM

## 2023-12-07 DIAGNOSIS — I16.0 HYPERTENSIVE URGENCY: ICD-10-CM

## 2023-12-07 DIAGNOSIS — G81.94 HEMIPLEGIA, UNSPECIFIED AFFECTING LEFT NONDOMINANT SIDE: ICD-10-CM

## 2023-12-07 DIAGNOSIS — R42 DIZZINESS AND GIDDINESS: ICD-10-CM

## 2023-12-07 DIAGNOSIS — Z79.82 LONG TERM (CURRENT) USE OF ASPIRIN: ICD-10-CM

## 2023-12-07 DIAGNOSIS — Z85.3 PERSONAL HISTORY OF MALIGNANT NEOPLASM OF BREAST: ICD-10-CM

## 2024-02-26 ENCOUNTER — EMERGENCY (EMERGENCY)
Facility: HOSPITAL | Age: 89
LOS: 0 days | Discharge: HOME | End: 2024-02-27
Attending: STUDENT IN AN ORGANIZED HEALTH CARE EDUCATION/TRAINING PROGRAM
Payer: MEDICARE

## 2024-02-26 VITALS — TEMPERATURE: 96 F | OXYGEN SATURATION: 99 % | RESPIRATION RATE: 18 BRPM | HEART RATE: 62 BPM | WEIGHT: 110.01 LBS

## 2024-02-26 VITALS
OXYGEN SATURATION: 99 % | SYSTOLIC BLOOD PRESSURE: 170 MMHG | RESPIRATION RATE: 16 BRPM | HEART RATE: 58 BPM | DIASTOLIC BLOOD PRESSURE: 71 MMHG

## 2024-02-26 DIAGNOSIS — E11.9 TYPE 2 DIABETES MELLITUS WITHOUT COMPLICATIONS: ICD-10-CM
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
## 2024-02-26 DIAGNOSIS — S09.90XA UNSPECIFIED INJURY OF HEAD, INITIAL ENCOUNTER: ICD-10-CM

## 2024-02-26 DIAGNOSIS — E78.5 HYPERLIPIDEMIA, UNSPECIFIED: ICD-10-CM

## 2024-02-26 DIAGNOSIS — Z79.02 LONG TERM (CURRENT) USE OF ANTITHROMBOTICS/ANTIPLATELETS: ICD-10-CM

## 2024-02-26 DIAGNOSIS — Z79.84 LONG TERM (CURRENT) USE OF ORAL HYPOGLYCEMIC DRUGS: ICD-10-CM

## 2024-02-26 DIAGNOSIS — Z88.5 ALLERGY STATUS TO NARCOTIC AGENT: ICD-10-CM

## 2024-02-26 DIAGNOSIS — Z90.10 ACQUIRED ABSENCE OF UNSPECIFIED BREAST AND NIPPLE: Chronic | ICD-10-CM

## 2024-02-26 DIAGNOSIS — I69.354 HEMIPLEGIA AND HEMIPARESIS FOLLOWING CEREBRAL INFARCTION AFFECTING LEFT NON-DOMINANT SIDE: ICD-10-CM
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
## 2024-02-26 DIAGNOSIS — I25.10 ATHEROSCLEROTIC HEART DISEASE OF NATIVE CORONARY ARTERY WITHOUT ANGINA PECTORIS: ICD-10-CM

## 2024-02-26 DIAGNOSIS — R00.1 BRADYCARDIA, UNSPECIFIED: ICD-10-CM

## 2024-02-26 LAB
ALBUMIN SERPL ELPH-MCNC: 4.4 G/DL — SIGNIFICANT CHANGE UP (ref 3.5–5.2)
ALP SERPL-CCNC: 60 U/L — SIGNIFICANT CHANGE UP (ref 30–115)
ALT FLD-CCNC: 12 U/L — SIGNIFICANT CHANGE UP (ref 0–41)
ANION GAP SERPL CALC-SCNC: 12 MMOL/L — SIGNIFICANT CHANGE UP (ref 7–14)
APPEARANCE UR: CLEAR — SIGNIFICANT CHANGE UP
APTT BLD: 36.7 SEC — SIGNIFICANT CHANGE UP (ref 27–39.2)
AST SERPL-CCNC: 18 U/L — SIGNIFICANT CHANGE UP (ref 0–41)
BASOPHILS # BLD AUTO: 0.03 K/UL — SIGNIFICANT CHANGE UP (ref 0–0.2)
BASOPHILS NFR BLD AUTO: 0.3 % — SIGNIFICANT CHANGE UP (ref 0–1)
BILIRUB SERPL-MCNC: 0.2 MG/DL — SIGNIFICANT CHANGE UP (ref 0.2–1.2)
BILIRUB UR-MCNC: NEGATIVE — SIGNIFICANT CHANGE UP
BUN SERPL-MCNC: 19 MG/DL — SIGNIFICANT CHANGE UP (ref 10–20)
CALCIUM SERPL-MCNC: 9.9 MG/DL — SIGNIFICANT CHANGE UP (ref 8.4–10.5)
CHLORIDE SERPL-SCNC: 104 MMOL/L — SIGNIFICANT CHANGE UP (ref 98–110)
CO2 SERPL-SCNC: 24 MMOL/L — SIGNIFICANT CHANGE UP (ref 17–32)
COLOR SPEC: YELLOW — SIGNIFICANT CHANGE UP
CREAT SERPL-MCNC: 0.9 MG/DL — SIGNIFICANT CHANGE UP (ref 0.7–1.5)
DIFF PNL FLD: NEGATIVE — SIGNIFICANT CHANGE UP
EGFR: 61 ML/MIN/1.73M2 — SIGNIFICANT CHANGE UP
EOSINOPHIL # BLD AUTO: 0.04 K/UL — SIGNIFICANT CHANGE UP (ref 0–0.7)
EOSINOPHIL NFR BLD AUTO: 0.4 % — SIGNIFICANT CHANGE UP (ref 0–8)
ETHANOL SERPL-MCNC: <10 MG/DL — SIGNIFICANT CHANGE UP
GLUCOSE SERPL-MCNC: 89 MG/DL — SIGNIFICANT CHANGE UP (ref 70–99)
GLUCOSE UR QL: NEGATIVE MG/DL — SIGNIFICANT CHANGE UP
HCT VFR BLD CALC: 34.6 % — LOW (ref 37–47)
HGB BLD-MCNC: 11.3 G/DL — LOW (ref 12–16)
IMM GRANULOCYTES NFR BLD AUTO: 0.2 % — SIGNIFICANT CHANGE UP (ref 0.1–0.3)
INR BLD: 1.05 RATIO — SIGNIFICANT CHANGE UP (ref 0.65–1.3)
KETONES UR-MCNC: NEGATIVE MG/DL — SIGNIFICANT CHANGE UP
LACTATE SERPL-SCNC: 1.7 MMOL/L — SIGNIFICANT CHANGE UP (ref 0.7–2)
LEUKOCYTE ESTERASE UR-ACNC: ABNORMAL
LIDOCAIN IGE QN: 40 U/L — SIGNIFICANT CHANGE UP (ref 7–60)
LYMPHOCYTES # BLD AUTO: 1.29 K/UL — SIGNIFICANT CHANGE UP (ref 1.2–3.4)
LYMPHOCYTES # BLD AUTO: 14 % — LOW (ref 20.5–51.1)
MCHC RBC-ENTMCNC: 27.6 PG — SIGNIFICANT CHANGE UP (ref 27–31)
MCHC RBC-ENTMCNC: 32.7 G/DL — SIGNIFICANT CHANGE UP (ref 32–37)
MCV RBC AUTO: 84.6 FL — SIGNIFICANT CHANGE UP (ref 81–99)
MONOCYTES # BLD AUTO: 0.51 K/UL — SIGNIFICANT CHANGE UP (ref 0.1–0.6)
MONOCYTES NFR BLD AUTO: 5.5 % — SIGNIFICANT CHANGE UP (ref 1.7–9.3)
NEUTROPHILS # BLD AUTO: 7.35 K/UL — HIGH (ref 1.4–6.5)
NEUTROPHILS NFR BLD AUTO: 79.6 % — HIGH (ref 42.2–75.2)
NITRITE UR-MCNC: NEGATIVE — SIGNIFICANT CHANGE UP
NRBC # BLD: 0 /100 WBCS — SIGNIFICANT CHANGE UP (ref 0–0)
PH UR: 5.5 — SIGNIFICANT CHANGE UP (ref 5–8)
PLATELET # BLD AUTO: 168 K/UL — SIGNIFICANT CHANGE UP (ref 130–400)
PMV BLD: 8.4 FL — SIGNIFICANT CHANGE UP (ref 7.4–10.4)
POTASSIUM SERPL-MCNC: 4 MMOL/L — SIGNIFICANT CHANGE UP (ref 3.5–5)
POTASSIUM SERPL-SCNC: 4 MMOL/L — SIGNIFICANT CHANGE UP (ref 3.5–5)
PROT SERPL-MCNC: 6.7 G/DL — SIGNIFICANT CHANGE UP (ref 6–8)
PROT UR-MCNC: SIGNIFICANT CHANGE UP MG/DL
PROTHROM AB SERPL-ACNC: 12 SEC — SIGNIFICANT CHANGE UP (ref 9.95–12.87)
RBC # BLD: 4.09 M/UL — LOW (ref 4.2–5.4)
RBC # FLD: 13.7 % — SIGNIFICANT CHANGE UP (ref 11.5–14.5)
SODIUM SERPL-SCNC: 140 MMOL/L — SIGNIFICANT CHANGE UP (ref 135–146)
SP GR SPEC: 1.01 — SIGNIFICANT CHANGE UP (ref 1–1.03)
TROPONIN T, HIGH SENSITIVITY RESULT: 26 NG/L — HIGH (ref 6–13)
UROBILINOGEN FLD QL: 0.2 MG/DL — SIGNIFICANT CHANGE UP (ref 0.2–1)
WBC # BLD: 9.24 K/UL — SIGNIFICANT CHANGE UP (ref 4.8–10.8)
WBC # FLD AUTO: 9.24 K/UL — SIGNIFICANT CHANGE UP (ref 4.8–10.8)

## 2024-02-26 PROCEDURE — 74177 CT ABD & PELVIS W/CONTRAST: CPT | Mod: MC

## 2024-02-26 PROCEDURE — 72125 CT NECK SPINE W/O DYE: CPT | Mod: MC

## 2024-02-26 PROCEDURE — 82962 GLUCOSE BLOOD TEST: CPT

## 2024-02-26 PROCEDURE — 80307 DRUG TEST PRSMV CHEM ANLYZR: CPT

## 2024-02-26 PROCEDURE — 71260 CT THORAX DX C+: CPT | Mod: 26,MC

## 2024-02-26 PROCEDURE — 73100 X-RAY EXAM OF WRIST: CPT | Mod: LT

## 2024-02-26 PROCEDURE — 83605 ASSAY OF LACTIC ACID: CPT

## 2024-02-26 PROCEDURE — 71045 X-RAY EXAM CHEST 1 VIEW: CPT | Mod: 26

## 2024-02-26 PROCEDURE — 93005 ELECTROCARDIOGRAM TRACING: CPT

## 2024-02-26 PROCEDURE — 84484 ASSAY OF TROPONIN QUANT: CPT

## 2024-02-26 PROCEDURE — 99285 EMERGENCY DEPT VISIT HI MDM: CPT | Mod: 25

## 2024-02-26 PROCEDURE — 85730 THROMBOPLASTIN TIME PARTIAL: CPT

## 2024-02-26 PROCEDURE — 85610 PROTHROMBIN TIME: CPT

## 2024-02-26 PROCEDURE — 72125 CT NECK SPINE W/O DYE: CPT | Mod: 26,MC

## 2024-02-26 PROCEDURE — 93010 ELECTROCARDIOGRAM REPORT: CPT

## 2024-02-26 PROCEDURE — 85025 COMPLETE CBC W/AUTO DIFF WBC: CPT

## 2024-02-26 PROCEDURE — 72170 X-RAY EXAM OF PELVIS: CPT

## 2024-02-26 PROCEDURE — 83690 ASSAY OF LIPASE: CPT

## 2024-02-26 PROCEDURE — 70450 CT HEAD/BRAIN W/O DYE: CPT | Mod: MC

## 2024-02-26 PROCEDURE — 81001 URINALYSIS AUTO W/SCOPE: CPT

## 2024-02-26 PROCEDURE — 73070 X-RAY EXAM OF ELBOW: CPT | Mod: LT

## 2024-02-26 PROCEDURE — 71045 X-RAY EXAM CHEST 1 VIEW: CPT

## 2024-02-26 PROCEDURE — 73100 X-RAY EXAM OF WRIST: CPT | Mod: 26,LT

## 2024-02-26 PROCEDURE — 72170 X-RAY EXAM OF PELVIS: CPT | Mod: 26

## 2024-02-26 PROCEDURE — 36415 COLL VENOUS BLD VENIPUNCTURE: CPT

## 2024-02-26 PROCEDURE — 71260 CT THORAX DX C+: CPT | Mod: MC

## 2024-02-26 PROCEDURE — 99285 EMERGENCY DEPT VISIT HI MDM: CPT

## 2024-02-26 PROCEDURE — 80053 COMPREHEN METABOLIC PANEL: CPT

## 2024-02-26 PROCEDURE — 73070 X-RAY EXAM OF ELBOW: CPT | Mod: 26,LT

## 2024-02-26 PROCEDURE — 74177 CT ABD & PELVIS W/CONTRAST: CPT | Mod: 26,MC

## 2024-02-26 PROCEDURE — 70450 CT HEAD/BRAIN W/O DYE: CPT | Mod: 26,MC

## 2024-02-26 RX ORDER — SODIUM CHLORIDE 9 MG/ML
250 INJECTION INTRAMUSCULAR; INTRAVENOUS; SUBCUTANEOUS ONCE
Refills: 0 | Status: COMPLETED | OUTPATIENT
Start: 2024-02-26 | End: 2024-02-26

## 2024-02-26 RX ADMIN — SODIUM CHLORIDE 250 MILLILITER(S): 9 INJECTION INTRAMUSCULAR; INTRAVENOUS; SUBCUTANEOUS at 19:31

## 2024-02-26 NOTE — ED ADULT NURSE NOTE - OBJECTIVE STATEMENT
Pt presents to ED S/P fall. Pt states she was going to feed her cat when she felt like she was she was going to pass out. Pt states she fell backwards, hit her head, and did not pass out. Pt states she then crawled to furniture to help herself up and call an ambulance. Pt denies anticoagulation medication.

## 2024-02-26 NOTE — ED PROVIDER NOTE - PHYSICAL EXAMINATION
CONSTITUTIONAL: well developed, well nourished, no acute distress  TRAUMA: ABC intact, GCS 15  HEAD: normocephalic, atraumatic  EYES: PERRL, EOMI, no raccoon eyes  ENT: no nasal discharge, no septal hematoma, no hemotympanum, no abdul sign, moist mucous membranes,   NECK:, no midline tenderness, no stepoffs, no deformity  CV: regular rate and rhythm, equal distal pulses  RESP: lungs clear to auscultation bilaterally, normal work of breathing, symmetric rise and fall of chest   CHEST: no chest wall tenderness, no crepitus, no clavicular deformity/tenting  ABD: soft, nondistended, nontender, no rebound, no guarding, no rigidity, no seatbelt sign, no pelvic instability  BACK: no midline T/L/S-spine tenderness, no stepoffs, no deformity  EXT: no obvious deformity, no tenderness of extremities, full ROM, cap refill < 2 seconds  SKIN: no rashes, no lacerations, no abrasions  NEURO: A&Ox3, motor strength 5/5 in all extremities, sensation grossly intact throughout, no focal neurological deficits, GCS 15  PSYCH: normal mood, appropriate affect

## 2024-02-26 NOTE — ED PROVIDER NOTE - CLINICAL SUMMARY MEDICAL DECISION MAKING FREE TEXT BOX
87 y/o F h/o HLD, DM, CVA w/ L sided weakness, breast CA s/p mastectomy, CAD on plavix presents after fall. Pt tells me she leaned down to feed her cats and became dizzy, describes as room spinning. She has a history of chronic vertigo and frequently falls. + Head strike, no LOC. Crawled to called neighbor who helped call EMS to bring to the ED. Otherwise no complaints.     On exam, mildly bradycardic. Agree with exam as documented by resident except for me has mild midline upper T spine TTP, mild bony TTP to the L pelvis.    Labs sent which are unremarkable except for slightly elevated trop, pending repeat. CT imaging and CXR unremarkable. Troponins stable.     labs and imaging reviewed with pt. given good instructions when to return to ED and importance of f/u.  all incidental findings were discussed with pt as well. pt verbalized understanding. patient was given opportunity to ask questions.

## 2024-02-26 NOTE — ED PROVIDER NOTE - NSFOLLOWUPINSTRUCTIONS_ED_ALL_ED_FT
You were admitted into the hospital because you fell. You feel likely due to a mechanical reason.  During your hospital coure you did not have any arrthymias on EKG and CT imaging of your brain did not show any signs of fracture or acute bleeding. Please follow up with your primary care doctor afterwards. Thank you.

## 2024-02-26 NOTE — ED ADULT TRIAGE NOTE - CHIEF COMPLAINT QUOTE
pt BIBA from home s/p fall after feeling weakness today  fell onto left side c/o left hip pain and left elbow pain   states she hit her head and feels dizzy since   denies LOC/vomiting  unable to obtain bp in triage, SBP 80 by EMS IVF started en route

## 2024-02-26 NOTE — ED PROVIDER NOTE - CARE PROVIDER_API CALL
Brenton Duckworth  Internal Medicine  [address] [place] [address]  Phone: [phone]  Fax: [phone]  Follow Up Time:

## 2024-02-26 NOTE — ED ADULT NURSE NOTE - NSFALLRISKINTERV_ED_ALL_ED
Assistance OOB with selected safe patient handling equipment if applicable/Assistance with ambulation/Communicate fall risk and risk factors to all staff, patient, and family/Monitor gait and stability/Provide visual cue: yellow wristband, yellow gown, etc/Reinforce activity limits and safety measures with patient and family/Call bell, personal items and telephone in reach/Instruct patient to call for assistance before getting out of bed/chair/stretcher/Non-slip footwear applied when patient is off stretcher/Smock to call system/Physically safe environment - no spills, clutter or unnecessary equipment/Purposeful Proactive Rounding/Room/bathroom lighting operational, light cord in reach

## 2024-02-26 NOTE — ED PROVIDER NOTE - OBJECTIVE STATEMENT
89yo female past med hx of CVA, DM on glipuride/metformin, HTN, Patient is a 88-year-old female past medical history of HLD, DM on glipuride/metformin, CVA with residual L sided weakness, breast CA s/p mastectomy, CAD on Plavix presents to ED status post fall.  Patient states that she was bending down to feed her dog while holding onto her walker and had a mechanical fall onto her left side.  Patient states that she banged her left side onto something before falling.  Patient notes she did hit her head.  No loss of consciousness.  Patient was on the ground for short amount of time and crawled to the phone and called family who called EMS and brought her to the ED.  Patient is ANO x 3 and has no complaints of pain in any extremity.  Patient denies any chest pain, shortness of breath, dizziness, headache, vision changes.

## 2024-02-26 NOTE — ED PROVIDER NOTE - PATIENT PORTAL LINK FT
You can access the FollowMyHealth Patient Portal offered by [facility] by registering at the following website: [email or web address]. By joining [employer]’s FollowMyHealth portal, you will also be able to view your health information using other applications (apps) compatible with our system.

## 2024-02-26 NOTE — ED PROVIDER NOTE - ATTENDING CONTRIBUTION TO CARE
87 y/o F h/o HLD, DM, CVA w/ L sided weakness, breast CA s/p mastectomy, CAD on plavix presents after fall. Pt tells me she leaned down to feed her cats and became dizzy, describes as room spinning. She has a history of chronic vertigo and frequently falls. + Head strike, no LOC. Crawled to called neighbor who helped call EMS to bring to the ED. Otherwise no complaints.     On exam, mildly bradycardic. Agree with exam as documented by resident except for me has mild midline upper T spine TTP, mild bony TTP to the L pelvis.    Labs sent which are unremarkable except for slightly elevated trop, pending repeat. CT imaging and CXR unremarkable. see mdm for attending note

## 2024-02-26 NOTE — ED PROVIDER NOTE - PROVIDER TOKENS
Verified Results  XR HIPS ROBERTO 2V EA HIP [id number] 09:25AM PARKER CELIS   Ordering Provider: Neal Miguel. Reason For Study: .,..   [Oct 5, 2018 2:43PM PARKER CELIS]  Let patient know: mild arthritic changes of bilateral hip joints; PHYSICAL THERAPY CONSULTATION recommended - please enter referral and let  know if patient is willing. 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 No   fractures or periosteal changes demonstrated.     **** F I N A L ****    Transcribed By: PAUL   10/05/18 10:56 am    Dictated By:      Shane Chapa    Electronically Reviewed and Approved By:      Shane Chapa 10/05/18 11:19 am
PROVIDER:[TOKEN:[[id number]]]

## 2024-02-27 LAB — TROPONIN T, HIGH SENSITIVITY RESULT: 23 NG/L — HIGH (ref 6–13)

## 2024-04-08 NOTE — ED ADULT NURSE NOTE - NS ED NURSE DISCH DISPOSITION
Patient Name:  Osmin Hook  MRN:  [id number]    Assessment & Plan     1. Traumatic complete tear of right rotator cuff, subsequent encounter    2. Dislocation of right shoulder joint, subsequent encounter    3. Right shoulder pain, unspecified chronicity      58 y.o. male with Right shoulder supraspinatus and infraspinatus tear s/p dislocation with bony bankart and hill-sacs lesion DOI 1/20/24.   X-rays, CT scan, and MRI study reviewed in the office today  Operative management was discussed in the form of arthroscopic rotator cuff repair, possible biceps tenotomy. Risks of surgery, including but not limited to, infection, nerve and blood vessel injury, postoperative stiffness, persistent pain, retear, inability to repair, DVT/PE, anesthesia complications, posttraumatic arthritis, deformity and cramping tenotomy, and need for subsequent surgery were discussed in detail.  The patient understands and has elected to proceed forward.  Post operative care was discussed in detail with the patient. All questions were answered to the patient's satisfaction.   He has gained preoperative clearance from PCP. He has completed labs, CXR, EKG  Surgery scheduled for 4/11/24  The patient will follow up post operatively    History of the Present Illness   Osmin Hook is a 58 y.o. male with Right shoulder supraspinatus and infraspinatus tear s/p dislocation with bony bankart and hill-sacs lesion DOI 1/20/24. . He is scheduled for arthroscopic rotator cuff repair and biceps tenotomy on 04/11/2024. He has had his labs, CXR, EKG, and gained preoperative clearance from his PCP.         Review of Systems     Review of Systems   Constitutional:  Negative for chills and fever.   HENT:  Negative for ear pain and sore throat.    Eyes:  Negative for pain and visual disturbance.   Respiratory:  Negative for cough and shortness of breath.    Cardiovascular:  Negative for chest pain and palpitations.   Gastrointestinal:  Negative for  "abdominal pain and vomiting.   Genitourinary:  Negative for dysuria and hematuria.   Musculoskeletal:  Negative for arthralgias and back pain.   Skin:  Negative for color change and rash.   Neurological:  Negative for seizures and syncope.   All other systems reviewed and are negative.      Physical Exam     /84   Pulse 73   Ht 5' 6\" (1.676 m)   Wt (!) 137 kg (303 lb)   BMI 48.91 kg/m²     Right Shoulder:   Active range of motion   110 degrees forward flexion  90 degrees abduction  30 degrees external rotation   SI joint internal rotation    Passive range of motion   150 degrees of forward flexion     There is no tenderness present over the shoulder.   Supraspinatus testing 4/5  Infraspinatus testing 4/5  Subscapularis testing 4+/5  Goel test is negative   [place]ag[place]a's test is negative    Speed's test is Positive  The patient is neurovascularly intact distally in the extremity.        Data Review     I have personally reviewed pertinent films in PACS, and my interpretation follows.    X-rays taken 24 of [facility] demonstrate successful reduction glenohumeral joint.     CT scan taken 24 of [facility] demonstrate congruent glenohumeral joint with bony Bankart and hill-sacs lesion.     Right shoulder MRI obtained 2024 demonstrates bony Bankart and hill sachs lesions with associated glenoid labral tear. Full thickness tear of supraspinatus and infraspinatus with retraction to the humeral head apex and mild fatty atrophy. Medial dislocation of the long head of the biceps tendon.   Social History     Tobacco Use    Smoking status: Former     Current packs/day: 0.00     Average packs/day: 0.3 packs/day for 5.8 years (1.4 ttl pk-yrs)     Types: Cigarettes     Start date: 1984     Quit date: 10/20/1989     Years since quittin.4    Smokeless tobacco: Former     Types: Chew     Quit date:    Vaping Use    Vaping status: Never Used   Substance Use Topics    Alcohol use: Yes     " Alcohol/week: 4.0 standard drinks of alcohol     Types: 4 Standard drinks or equivalent per week     Comment: Occ alcohol use per [employer]     Drug use: Yes     Frequency: 7.0 times per week     Types: Marijuana     Comment: medical           Procedures  None performed.    Tommie Baird DO   Scribe Attestation      I,:  Kaylan Vera am acting as a scribe while in the presence of the attending physician.:       I,:  Tommie Baird DO personally performed the services described in this documentation    as scribed in my presence.:              Discharged

## 2024-04-14 NOTE — ED PROVIDER NOTE - CONDITION AT DISCHARGE:
I performed a history and physical examination of the patient and discussed management with the resident. I reviewed the resident’s note and agree with the documented findings and plan of care. Any areas of disagreement are noted on the chart. I was personally present for the key portions of any procedures. I have documented in the chart those procedures where I was not present during the key portions. Unless noted in my documentation, I agree with the chief complaint, past medical history, past surgical history, allergies, medications, social and family history as documented. Documentation of the HPI, Physical Exam and Medical Decision Making performed by medical students or scribes is based on my personal performance of the HPI, PE and MDM.   For Phys Assistant/ Nurse Practitioner cases/documentation I have personally evaluated this patient and have completed at least one if not all key elements of the E/M (history, physical exam, and MDM). I find the patient's history and physical exam are consistent with the NP/PA documentation. I agree with the care provided, treatment rendered, disposition and followup plan.   Additional findings are as noted.    Dusty Baker MD  Attending Emergency  Physician        This patient presented to the emergency department with complaints of cough occasionally productive of yellowish sputum as well as nasal congestion and rhinorrhea and muscle aches and joint aches for the past 5 days.  She reports she had a headache earlier but that is since resolved.  She denies any hemoptysis.  No fevers or chills.  No vomiting or diarrhea.  Patient is a smoker.  She does have a history of asthma.  She is not using any metered-dose inhalers.  She reports she also had a mild sore throat.  No rash.  Awake, alert, coop, resp. Lungs clear bilaterally, good air entry throughout. No rales, rhonchi, wheezes, stridor, retractions. Cardiac-S1S2, RRR, no MRG. Abd soft, nondistended, nontender. Normal  provider.          Iliana Isbell DO  Emergency Medicine Resident    (Please note that portions of this note were completed with a voice recognition program.  Efforts were made to edit the dictations but occasionally words are mis-transcribed.)     Improved

## 2024-04-15 NOTE — ED PROVIDER NOTE - CLINICAL SUMMARY MEDICAL DECISION MAKING FREE TEXT BOX
evaluated for chest pain, ekg and labs non ischemic, plan is to place alena bs for further risk stratification. Heterosexual

## 2024-04-18 ENCOUNTER — APPOINTMENT (OUTPATIENT)
Dept: NEUROLOGY | Facility: CLINIC | Age: 89
End: 2024-04-18
Payer: MEDICARE

## 2024-04-18 VITALS
HEIGHT: 62 IN | OXYGEN SATURATION: 100 % | TEMPERATURE: 98.6 F | HEART RATE: 57 BPM | BODY MASS INDEX: 21.16 KG/M2 | DIASTOLIC BLOOD PRESSURE: 74 MMHG | SYSTOLIC BLOOD PRESSURE: 189 MMHG | WEIGHT: 115 LBS

## 2024-04-18 DIAGNOSIS — R26.89 OTHER ABNORMALITIES OF GAIT AND MOBILITY: ICD-10-CM

## 2024-04-18 PROCEDURE — 99214 OFFICE O/P EST MOD 30 MIN: CPT

## 2024-04-18 NOTE — PHYSICAL EXAM
[General Appearance - Alert] : alert [General Appearance - In No Acute Distress] : in no acute distress [General Appearance - Well Nourished] : well nourished [General Appearance - Well Developed] : well developed [Oriented To Time, Place, And Person] : oriented to person, place, and time [Affect] : the affect was normal [Person] : oriented to person [Place] : oriented to place [Time] : oriented to time [Fluency] : fluency intact [Comprehension] : comprehension intact [Cranial Nerves Optic (II)] : visual acuity intact bilaterally,  visual fields full to confrontation, pupils equal round and reactive to light [Cranial Nerves Oculomotor (III)] : extraocular motion intact [Cranial Nerves Trigeminal (V)] : facial sensation intact symmetrically [Cranial Nerves Facial (VII)] : face symmetrical [Cranial Nerves Glossopharyngeal (IX)] : tongue and palate midline [Cranial Nerves Hypoglossal (XII)] : there was no tongue deviation with protrusion [Finger Rub Not [place]] : finger rub was not heard [Motor Tone] : muscle tone was normal in all four extremities [Motor Strength] : muscle strength was normal in all four extremities [Paresis Pronator Drift Right-Sided] : no pronator drift on the right [Paresis Pronator Drift Left-Sided] : no pronator drift on the left [Sensation Tactile Decrease] : light touch was intact [Coordination - Dysmetria Impaired Finger-to-Nose Bilateral] : not present [2+] : Patella left 2+ [FreeTextEntry8] : normal stance, short strides, 3 step turn, unable to stand with feet together without truncal swaying

## 2024-04-18 NOTE — DISCUSSION/SUMMARY
[FreeTextEntry1] : Pt is a 88 yo F with PMHx of chronic right cerebellar and occipital stroke (uncertain when they occurred), hearing loss, HTN, HLD, DM, CAD on DAPT, breast ca s/p mastectomy, who presents for follow up.  # Ataxia: likely multifactorial with h/o right cerebellar stroke and noted h/o vertigo since her teens. No acute ischemic appreciated on last MRI brain. NIHSS 0, mRS 2. - Continue DAPT/statin (uncertain whether this was prescribed for stroke or CAD or both) - Physical therapy referral   RTC in 12 mo.

## 2024-04-18 NOTE — HISTORY OF PRESENT ILLNESS
[FreeTextEntry1] : Interval History: Pt presents today for f/u. Endorses some improvement of balance since starting to use a walker, but continues to have falls once every few months. can occur at any time, feels light headed and "dissociated" prior to spell. Denies LOC. States that she has had these falls since she was 3 yo and it has not changed since then.   HPI: Pt is a 87 yo F with PMHx of chronic right cerebellar and occipital stroke (uncertain when they occurred), hearing loss, HTN, HLD, DM, CAD on DAPT, breast ca s/p mastectomy, who presents for hospital follow up. Pt was seen at [facility] with vertigo, was diagnosed with BPPV. Pt endorses persistent vertigo, states that it began in her teens, got worse after the stroke many years ago, and then recently got even worse. States that she uses a walker outside, uses her furniture to hold on to when ambulating at home. Denies recent falls. Denies new unilateral weakness, clumsiness, numbness, speech or vision difficulty. She lives alone, able to complete ADL's independently. Does her grocery shopping and cooking on her own. She stopped driving 3 years ago due to equilibrium difficulty.

## 2024-04-30 NOTE — ED ADULT NURSE NOTE - NSFALLRISKFACTORS_ED_ALL_ED
Age: 85 years old or older
Post-Care Instructions: I reviewed with the patient in detail post-care instructions. Patient is to wear sunprotection, and avoid picking at any of the treated lesions. Pt may apply Vaseline to crusted or scabbing areas.
Detail Level: Detailed
Render Post-Care Instructions In Note?: no
Show Aperture Variable?: Yes
Consent: The patient's consent was obtained including but not limited to risks of crusting, scabbing, blistering, scarring, darker or lighter pigmentary change, recurrence, incomplete removal and infection.
Duration Of Freeze Thaw-Cycle (Seconds): 0

## 2024-12-22 ENCOUNTER — EMERGENCY (EMERGENCY)
Facility: HOSPITAL | Age: 88
LOS: 0 days | Discharge: HOME | End: 2024-12-23
Attending: STUDENT IN AN ORGANIZED HEALTH CARE EDUCATION/TRAINING PROGRAM
Payer: MEDICARE

## 2024-12-22 VITALS
OXYGEN SATURATION: 99 % | WEIGHT: 119.93 LBS | RESPIRATION RATE: 17 BRPM | TEMPERATURE: 98 F | DIASTOLIC BLOOD PRESSURE: 112 MMHG | SYSTOLIC BLOOD PRESSURE: 168 MMHG | HEIGHT: 62 IN | HEART RATE: 70 BPM
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
## 2024-12-22 DIAGNOSIS — Z90.10 ACQUIRED ABSENCE OF UNSPECIFIED BREAST AND NIPPLE: Chronic | ICD-10-CM

## 2024-12-22 DIAGNOSIS — S02.2XXA FRACTURE OF NASAL BONES, INITIAL ENCOUNTER FOR CLOSED FRACTURE: ICD-10-CM

## 2024-12-22 DIAGNOSIS — S81.011A LACERATION WITHOUT FOREIGN BODY, RIGHT KNEE, INITIAL ENCOUNTER: ICD-10-CM

## 2024-12-22 DIAGNOSIS — N39.0 URINARY TRACT INFECTION, SITE NOT SPECIFIED: ICD-10-CM

## 2024-12-22 DIAGNOSIS — Z23 ENCOUNTER FOR IMMUNIZATION: ICD-10-CM
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
## 2024-12-22 DIAGNOSIS — Z85.3 PERSONAL HISTORY OF MALIGNANT NEOPLASM OF BREAST: ICD-10-CM

## 2024-12-22 DIAGNOSIS — Z90.11 ACQUIRED ABSENCE OF RIGHT BREAST AND NIPPLE: ICD-10-CM

## 2024-12-22 DIAGNOSIS — E11.9 TYPE 2 DIABETES MELLITUS WITHOUT COMPLICATIONS: ICD-10-CM

## 2024-12-22 DIAGNOSIS — Z79.02 LONG TERM (CURRENT) USE OF ANTITHROMBOTICS/ANTIPLATELETS: ICD-10-CM

## 2024-12-22 DIAGNOSIS — I69.354 HEMIPLEGIA AND HEMIPARESIS FOLLOWING CEREBRAL INFARCTION AFFECTING LEFT NON-DOMINANT SIDE: ICD-10-CM

## 2024-12-22 DIAGNOSIS — Z88.5 ALLERGY STATUS TO NARCOTIC AGENT: ICD-10-CM

## 2024-12-22 DIAGNOSIS — I25.10 ATHEROSCLEROTIC HEART DISEASE OF NATIVE CORONARY ARTERY WITHOUT ANGINA PECTORIS: ICD-10-CM

## 2024-12-22 DIAGNOSIS — I10 ESSENTIAL (PRIMARY) HYPERTENSION: ICD-10-CM

## 2024-12-22 DIAGNOSIS — Z79.82 LONG TERM (CURRENT) USE OF ASPIRIN: ICD-10-CM

## 2024-12-22 DIAGNOSIS — S02.40DA MAXILLARY FRACTURE, LEFT SIDE, INITIAL ENCOUNTER FOR CLOSED FRACTURE: ICD-10-CM

## 2024-12-22 DIAGNOSIS — S01.81XA LACERATION WITHOUT FOREIGN BODY OF OTHER PART OF HEAD, INITIAL ENCOUNTER: ICD-10-CM

## 2024-12-22 LAB
ALBUMIN SERPL ELPH-MCNC: 4.2 G/DL — SIGNIFICANT CHANGE UP (ref 3.5–5.2)
ALP SERPL-CCNC: 66 U/L — SIGNIFICANT CHANGE UP (ref 30–115)
ALT FLD-CCNC: 25 U/L — SIGNIFICANT CHANGE UP (ref 0–41)
ANION GAP SERPL CALC-SCNC: 14 MMOL/L — SIGNIFICANT CHANGE UP (ref 7–14)
APTT BLD: 27.1 SEC — SIGNIFICANT CHANGE UP (ref 27–39.2)
AST SERPL-CCNC: 42 U/L — HIGH (ref 0–41)
BASOPHILS # BLD AUTO: 0.02 K/UL — SIGNIFICANT CHANGE UP (ref 0–0.2)
BASOPHILS NFR BLD AUTO: 0.4 % — SIGNIFICANT CHANGE UP (ref 0–1)
BILIRUB SERPL-MCNC: 0.2 MG/DL — SIGNIFICANT CHANGE UP (ref 0.2–1.2)
BUN SERPL-MCNC: 26 MG/DL — HIGH (ref 10–20)
CALCIUM SERPL-MCNC: 8.9 MG/DL — SIGNIFICANT CHANGE UP (ref 8.4–10.5)
CHLORIDE SERPL-SCNC: 102 MMOL/L — SIGNIFICANT CHANGE UP (ref 98–110)
CK SERPL-CCNC: 301 U/L — HIGH (ref 0–225)
CO2 SERPL-SCNC: 20 MMOL/L — SIGNIFICANT CHANGE UP (ref 17–32)
CREAT SERPL-MCNC: 1.1 MG/DL — SIGNIFICANT CHANGE UP (ref 0.7–1.5)
EGFR: 48 ML/MIN/1.73M2 — LOW
EOSINOPHIL # BLD AUTO: 0.02 K/UL — SIGNIFICANT CHANGE UP (ref 0–0.7)
EOSINOPHIL NFR BLD AUTO: 0.4 % — SIGNIFICANT CHANGE UP (ref 0–8)
ETHANOL SERPL-MCNC: <10 MG/DL — SIGNIFICANT CHANGE UP
GLUCOSE SERPL-MCNC: 296 MG/DL — HIGH (ref 70–99)
HCT VFR BLD CALC: 33.3 % — LOW (ref 37–47)
HGB BLD-MCNC: 11.1 G/DL — LOW (ref 12–16)
IMM GRANULOCYTES NFR BLD AUTO: 0.4 % — HIGH (ref 0.1–0.3)
INR BLD: 0.96 RATIO — SIGNIFICANT CHANGE UP (ref 0.65–1.3)
LIDOCAIN IGE QN: 46 U/L — SIGNIFICANT CHANGE UP (ref 7–60)
LYMPHOCYTES # BLD AUTO: 1.13 K/UL — LOW (ref 1.2–3.4)
LYMPHOCYTES # BLD AUTO: 23.4 % — SIGNIFICANT CHANGE UP (ref 20.5–51.1)
MCHC RBC-ENTMCNC: 28.8 PG — SIGNIFICANT CHANGE UP (ref 27–31)
MCHC RBC-ENTMCNC: 33.3 G/DL — SIGNIFICANT CHANGE UP (ref 32–37)
MCV RBC AUTO: 86.5 FL — SIGNIFICANT CHANGE UP (ref 81–99)
MONOCYTES # BLD AUTO: 0.4 K/UL — SIGNIFICANT CHANGE UP (ref 0.1–0.6)
MONOCYTES NFR BLD AUTO: 8.3 % — SIGNIFICANT CHANGE UP (ref 1.7–9.3)
NEUTROPHILS # BLD AUTO: 3.24 K/UL — SIGNIFICANT CHANGE UP (ref 1.4–6.5)
NEUTROPHILS NFR BLD AUTO: 67.1 % — SIGNIFICANT CHANGE UP (ref 42.2–75.2)
NRBC # BLD: 0 /100 WBCS — SIGNIFICANT CHANGE UP (ref 0–0)
PLATELET # BLD AUTO: 146 K/UL — SIGNIFICANT CHANGE UP (ref 130–400)
PMV BLD: 10.1 FL — SIGNIFICANT CHANGE UP (ref 7.4–10.4)
POTASSIUM SERPL-MCNC: 4.8 MMOL/L — SIGNIFICANT CHANGE UP (ref 3.5–5)
POTASSIUM SERPL-SCNC: 4.8 MMOL/L — SIGNIFICANT CHANGE UP (ref 3.5–5)
PROT SERPL-MCNC: 6.4 G/DL — SIGNIFICANT CHANGE UP (ref 6–8)
PROTHROM AB SERPL-ACNC: 11.3 SEC — SIGNIFICANT CHANGE UP (ref 9.95–12.87)
RBC # BLD: 3.85 M/UL — LOW (ref 4.2–5.4)
RBC # FLD: 13.6 % — SIGNIFICANT CHANGE UP (ref 11.5–14.5)
SODIUM SERPL-SCNC: 136 MMOL/L — SIGNIFICANT CHANGE UP (ref 135–146)
WBC # BLD: 4.83 K/UL — SIGNIFICANT CHANGE UP (ref 4.8–10.8)
WBC # FLD AUTO: 4.83 K/UL — SIGNIFICANT CHANGE UP (ref 4.8–10.8)

## 2024-12-22 PROCEDURE — 80053 COMPREHEN METABOLIC PANEL: CPT

## 2024-12-22 PROCEDURE — 70486 CT MAXILLOFACIAL W/O DYE: CPT | Mod: MC

## 2024-12-22 PROCEDURE — 90471 IMMUNIZATION ADMIN: CPT

## 2024-12-22 PROCEDURE — 36000 PLACE NEEDLE IN VEIN: CPT

## 2024-12-22 PROCEDURE — 72170 X-RAY EXAM OF PELVIS: CPT | Mod: 26

## 2024-12-22 PROCEDURE — 72125 CT NECK SPINE W/O DYE: CPT | Mod: MC

## 2024-12-22 PROCEDURE — 90715 TDAP VACCINE 7 YRS/> IM: CPT

## 2024-12-22 PROCEDURE — 99284 EMERGENCY DEPT VISIT MOD MDM: CPT | Mod: 25

## 2024-12-22 PROCEDURE — 70450 CT HEAD/BRAIN W/O DYE: CPT | Mod: 26,MC

## 2024-12-22 PROCEDURE — 80307 DRUG TEST PRSMV CHEM ANLYZR: CPT

## 2024-12-22 PROCEDURE — 73564 X-RAY EXAM KNEE 4 OR MORE: CPT | Mod: RT

## 2024-12-22 PROCEDURE — 85730 THROMBOPLASTIN TIME PARTIAL: CPT

## 2024-12-22 PROCEDURE — 71045 X-RAY EXAM CHEST 1 VIEW: CPT | Mod: 26

## 2024-12-22 PROCEDURE — 73564 X-RAY EXAM KNEE 4 OR MORE: CPT | Mod: 26,RT

## 2024-12-22 PROCEDURE — 74177 CT ABD & PELVIS W/CONTRAST: CPT | Mod: MC

## 2024-12-22 PROCEDURE — 72170 X-RAY EXAM OF PELVIS: CPT

## 2024-12-22 PROCEDURE — 82550 ASSAY OF CK (CPK): CPT

## 2024-12-22 PROCEDURE — 85610 PROTHROMBIN TIME: CPT

## 2024-12-22 PROCEDURE — 83690 ASSAY OF LIPASE: CPT

## 2024-12-22 PROCEDURE — 99285 EMERGENCY DEPT VISIT HI MDM: CPT

## 2024-12-22 PROCEDURE — 81001 URINALYSIS AUTO W/SCOPE: CPT

## 2024-12-22 PROCEDURE — 85025 COMPLETE CBC W/AUTO DIFF WBC: CPT

## 2024-12-22 PROCEDURE — 71260 CT THORAX DX C+: CPT | Mod: MC

## 2024-12-22 PROCEDURE — 71045 X-RAY EXAM CHEST 1 VIEW: CPT

## 2024-12-22 PROCEDURE — 72125 CT NECK SPINE W/O DYE: CPT | Mod: 26,MC

## 2024-12-22 PROCEDURE — 70450 CT HEAD/BRAIN W/O DYE: CPT | Mod: MC

## 2024-12-22 PROCEDURE — 36415 COLL VENOUS BLD VENIPUNCTURE: CPT

## 2024-12-22 PROCEDURE — 70486 CT MAXILLOFACIAL W/O DYE: CPT | Mod: 26,MC

## 2024-12-22 RX ORDER — 0.9 % SODIUM CHLORIDE 0.9 %
1000 INTRAVENOUS SOLUTION INTRAVENOUS ONCE
Refills: 0 | Status: COMPLETED | OUTPATIENT
Start: 2024-12-22 | End: 2024-12-22

## 2024-12-22 RX ORDER — ACETAMINOPHEN 500MG 500 MG/1
650 TABLET, COATED ORAL ONCE
Refills: 0 | Status: COMPLETED | OUTPATIENT
Start: 2024-12-22 | End: 2024-12-22

## 2024-12-22 RX ORDER — TETANUS TOXOID, REDUCED DIPHTHERIA TOXOID AND ACELLULAR PERTUSSIS VACCINE, ADSORBED 5; 2.5; 8; 8; 2.5 [IU]/.5ML; [IU]/.5ML; UG/.5ML; UG/.5ML; UG/.5ML
0.5 SUSPENSION INTRAMUSCULAR ONCE
Refills: 0 | Status: COMPLETED | OUTPATIENT
Start: 2024-12-22 | End: 2024-12-22

## 2024-12-22 RX ADMIN — ACETAMINOPHEN 500MG 650 MILLIGRAM(S): 500 TABLET, COATED ORAL at 21:31

## 2024-12-22 RX ADMIN — Medication 1000 MILLILITER(S): at 23:25

## 2024-12-22 RX ADMIN — TETANUS TOXOID, REDUCED DIPHTHERIA TOXOID AND ACELLULAR PERTUSSIS VACCINE, ADSORBED 0.5 MILLILITER(S): 5; 2.5; 8; 8; 2.5 SUSPENSION INTRAMUSCULAR at 21:31

## 2024-12-22 NOTE — ED PROVIDER NOTE - OBJECTIVE STATEMENT
89-year-old female, with past medical history of HTN, DM, CVA with residual left-sided weakness, CAD on aspirin and Plavix, breast cancer s/p right mastectomy, presents to the ED after mechanical trip and fall outside when taking the garbage out.  Fell forward onto the front of her face without LOC.  No anticoagulation use.  Denies syncope, fever, chills, nausea, vomiting, chest pain, SOB, palpitations, dizziness.

## 2024-12-22 NOTE — ED PROVIDER NOTE - CLINICAL SUMMARY MEDICAL DECISION MAKING FREE TEXT BOX
89-year-old female with past medical history of HTN, HLD, DM, CVA with residual L sided weakness, breast CA s/p mastectomy, CAD on Plavix and ASA presents status post mechanical fall where patient reports she was bringing the garbage back in because she took it out on the wrong night, tripped on curve, landing on the front of her face.  Patient denies LOC.  Brought in by EMS status post fall.    no loc, not on any AC. Recalls all events. No fever, chills, n/v, cp,  pleuritic cp, sob, palpitations, diaphoresis, cough, chest wall/rib pain, clavicular pain, ankle pain, knee pain, shoulder pain, wrist pain, no back pain, no hip pain, no lh/dizziness, numbness/tingling, neck pain/ stiffness, abd pain,  melena/brbpr, urinary symptoms, edema, calf pain/swelling/erythema, sick contacts, recent travel . GCS 15.    On Exam:  Vital Signs: I have reviewed the initial vital signs.  Constitutional: Pt laying on stretcher in nad.   HEAD: No signs of basilar skull fracture.  Integumentary: No rash. L forehead laceration, ~ 1 mm, well approximated, Facial abrasions over forehead, b/l cheeks, and nose. R knee skin tear.  EYES: No periorbital swelling/ecchymoses. EOM intact. No nystagmus. No subconjunctival hemorrhage.   ENT: MMM. No rhinorrhea/otorrhea. No epistaxis,  No septal hematoma. No mastoid ecchymoses. No intraoral bleeding, No loose or cracked teeth, no active bleeding. No malocclusion. No TMJ pain.  NECK: Supple, non-tender, No palpable shelves or step-offs.  BACK: No spinous tenderness. No palpable shelves or step-offs.  Cardiovascular: RRR, radial pulses 2/4 b/l. dp and pt pulses 2/4/ b/l. No pain to palpation to chest wall.  Respiratory: BS present b/l, ctabl, no wheezing or crackles, no stridor. No pain to palpation to ribs b/l. No crepitus. No subq emphysema.   Gastrointestinal: BS present throughout all 4 quadrants, soft, nd, nt. no r/g.  Musculoskeletal: FROM of all extremities including b/l knees. No pain to palpation to clavicles, no obvious bony deformities. No hip pain. No short leg. No internal or external rotation of LE  Neurologic: GCS 15. CN II-XII intact, no facial droop or slurring of speech. Motor 5/5 and sensation intact throughout upper and lower extremities.     Plan: Cxr, pelvic xray, pan scan, labs, ivf, urine, tdap, pain control, reassess.    Labs and EKG were ordered and reviewed.  Imaging was ordered and reviewed by me.  Appropriate medications for patient's presenting complaints were ordered and effects were reassessed.  Patient's records (prior hospital, ED visit) were reviewed.  Additional history was obtained from EMS, family.

## 2024-12-22 NOTE — ED PROVIDER NOTE - NSFOLLOWUPINSTRUCTIONS_ED_ALL_ED_FT
Fall Prevention in the Home, Adult  Falls can cause injuries and can affect people from all age groups. There are many simple things that you can do to make your home safe and to help prevent falls. Ask for help when making these changes, if needed.    What actions can I take to prevent falls?  General instructions     Use good lighting in all rooms. Replace any light bulbs that burn out.  Turn on lights if it is dark. Use night-lights.  Place frequently used items in easy-to-reach places. Lower the shelves around your home if necessary.  Set up furniture so that there are clear paths around it. Avoid moving your furniture around.  Remove throw rugs and other tripping hazards from the floor.  Avoid walking on wet floors.  Fix any uneven floor surfaces.  Add color or contrast paint or tape to grab bars and handrails in your home. Place contrasting color strips on the first and last steps of stairways.  When you use a stepladder, make sure that it is completely opened and that the sides are firmly locked. Have someone hold the ladder while you are using it. Do not climb a closed stepladder.  Be aware of any and all pets.  What can I do in the bathroom?     Keep the floor dry. Immediately clean up any water that spills onto the floor.  Remove soap buildup in the tub or shower on a regular basis.  Use non-skid mats or decals on the floor of the tub or shower.  Attach bath mats securely with double-sided, non-slip rug tape.  If you need to sit down while you are in the shower, use a plastic, non-slip stool.  Image ImageInstall grab bars by the toilet and in the tub and shower. Do not use towel bars as grab bars.  What can I do in the bedroom?     Make sure that a bedside light is easy to reach.  Do not use oversized bedding that drapes onto the floor.  Have a firm chair that has side arms to use for getting dressed.  What can I do in the kitchen?     Clean up any spills right away.  If you need to reach for something above you, use a sturdy step stool that has a grab bar.  Keep electrical cables out of the way.  Do not use floor polish or wax that makes floors slippery. If you must use wax, make sure that it is non-skid floor wax.  What can I do in the stairways?     Do not leave any items on the stairs.  Make sure that you have a light switch at the top of the stairs and the bottom of the stairs. Have them installed if you do not have them.  Make sure that there are handrails on both sides of the stairs. Fix handrails that are broken or loose. Make sure that handrails are as long as the stairways.  Install non-slip stair treads on all stairs in your home.  Avoid having throw rugs at the top or bottom of stairways, or secure the rugs with carpet tape to prevent them from moving.  Choose a carpet design that does not hide the edge of steps on the stairway.  Check any carpeting to make sure that it is firmly attached to the stairs. Fix any carpet that is loose or worn.  What can I do on the outside of my home?     Use bright outdoor lighting.  Regularly repair the edges of walkways and driveways and fix any cracks.  Remove high doorway thresholds.  Trim any shrubbery on the main path into your home.  Regularly check that handrails are securely fastened and in good repair. Both sides of any steps should have handrails.  Install guardrails along the edges of any raised decks or porches.  Clear walkways of debris and clutter, including tools and rocks.  Have leaves, snow, and ice cleared regularly.  Use sand or salt on walkways during winter months.  In the garage, clean up any spills right away, including grease or oil spills.  What other actions can I take?     Wear closed-toe shoes that fit well and support your feet. Wear shoes that have rubber soles or low heels.  Use mobility aids as needed, such as canes, walkers, scooters, and crutches.  Review your medicines with your health care provider. Some medicines can cause dizziness or changes in blood pressure, which increase your risk of falling.  Talk with your health care provider about other ways that you can decrease your risk of falls. This may include working with a physical therapist or  to improve your strength, balance, and endurance.    Where to find more information  Centers for Disease Control and Prevention, [place]: https://www.cdc.gov  National [place] on Aging: https://jk3qnwi.jori.nih.gov  Contact a health care provider if:  You are afraid of falling at home.  You feel weak, drowsy, or dizzy at home.  You fall at home.  Summary  There are many simple things that you can do to make your home safe and to help prevent falls.  Ways to make your home safe include removing tripping hazards and installing grab bars in the bathroom.  Ask for help when making these changes in your home.  This information is not intended to replace advice given to you by your health care provider. Make sure you discuss any questions you have with your health care provider.        Urinary Tract Infection    A urinary tract infection (UTI) is an infection of any part of the urinary tract, which includes the kidneys, ureters, bladder, and urethra. Risk factors include ignoring your need to urinate, wiping back to front if female, being an uncircumcised male, and having diabetes or a weak immune system. Symptoms include frequent urination, pain or burning with urination, foul smelling urine, cloudy urine, pain in the lower abdomen, blood in the urine, and fever. If you were prescribed an antibiotic medicine, take it as told by your health care provider. Do not stop taking the antibiotic even if you start to feel better.    SEEK IMMEDIATE MEDICAL CARE IF YOU HAVE THE FOLLOWING SYMPTOMS: severe back or abdominal pain, inability to keep fluids or medicine down, dizziness/lightheadedness, or a change in mental status.

## 2024-12-22 NOTE — ED PROVIDER NOTE - ATTENDING CONTRIBUTION TO CARE
89-year-old female with past medical history of HTN, HLD, DM, CVA with residual L sided weakness, breast CA s/p mastectomy, CAD on Plavix and ASA presents status post mechanical fall where patient reports she was bringing the garbage back in because she took it out on the wrong night, tripped on curve, landing on the front of her face.  Patient denies LOC.  Brought in by EMS status post fall.    no loc, not on any AC. Recalls all events. No fever, chills, n/v, cp,  pleuritic cp, sob, palpitations, diaphoresis, cough, chest wall/rib pain, clavicular pain, ankle pain, knee pain, shoulder pain, wrist pain, no back pain, no hip pain, no lh/dizziness, numbness/tingling, neck pain/ stiffness, abd pain,  melena/brbpr, urinary symptoms, edema, calf pain/swelling/erythema, sick contacts, recent travel . GCS 15.    On Exam:  Vital Signs: I have reviewed the initial vital signs.  Constitutional: Pt laying on stretcher in nad.   HEAD: No signs of basilar skull fracture.  Integumentary: No rash. L forehead laceration, ~ 1 mm, well approximated, Facial abrasions over forehead, b/l cheeks, and nose. R knee skin tear.  EYES: No periorbital swelling/ecchymoses. EOM intact. No nystagmus. No subconjunctival hemorrhage.   ENT: MMM. No rhinorrhea/otorrhea. No epistaxis,  No septal hematoma. No mastoid ecchymoses. No intraoral bleeding, No loose or cracked teeth, no active bleeding. No malocclusion. No TMJ pain.  NECK: Supple, non-tender, No palpable shelves or step-offs.  BACK: No spinous tenderness. No palpable shelves or step-offs.  Cardiovascular: RRR, radial pulses 2/4 b/l. dp and pt pulses 2/4/ b/l. No pain to palpation to chest wall.  Respiratory: BS present b/l, ctabl, no wheezing or crackles, no stridor. No pain to palpation to ribs b/l. No crepitus. No subq emphysema.   Gastrointestinal: BS present throughout all 4 quadrants, soft, nd, nt. no r/g.  Musculoskeletal: FROM of all extremities including b/l knees. No pain to palpation to clavicles, no obvious bony deformities. No hip pain. No short leg. No internal or external rotation of LE  Neurologic: GCS 15. CN II-XII intact, no facial droop or slurring of speech. Motor 5/5 and sensation intact throughout upper and lower extremities.     Plan: Cxr, pelvic xray, pan scan, labs, ivf, urine, tdap, pain control, reassess.

## 2024-12-22 NOTE — ED PROVIDER NOTE - PATIENT PORTAL LINK FT
You can access the FollowMyHealth Patient Portal offered by [facility] by registering at the following website: [email or web address]. By joining [facility]’s FollowMyHealth portal, you will also be able to view your health information using other applications (apps) compatible with our system.

## 2024-12-22 NOTE — ED PROVIDER NOTE - CARE PROVIDERS DIRECT ADDRESSES
[email or web address]

## 2024-12-22 NOTE — ED PROVIDER NOTE - PROVIDER TOKENS
PROVIDER:[TOKEN:[[id number]],FOLLOWUP:[1-3 Days]] PROVIDER:[TOKEN:[[id number]],FOLLOWUP:[1-3 Days]],PROVIDER:[TOKEN:[[id number]],FOLLOWUP:[4-6 Days]]

## 2024-12-22 NOTE — ED ADULT TRIAGE NOTE - CHIEF COMPLAINT QUOTE
pt states "I was bringing the garbage back in because I took it out on the wrong night and I tripped on the curb".  (+) HT/ (-) LOC. no ac use  c/o pain to knee and face

## 2024-12-22 NOTE — ED PROVIDER NOTE - CARE PLAN
Principal Discharge DX:	Fall  Secondary Diagnosis:	Nasal fracture  Secondary Diagnosis:	Left maxillary fracture  Secondary Diagnosis:	Facial abrasion  Secondary Diagnosis:	Acute UTI   1

## 2024-12-22 NOTE — ED PROVIDER NOTE - DIFFERENTIAL DIAGNOSIS
The differential diagnosis for patients clinical presentation includes but is not limited to:  fracture  dislocation  ich  metabolic vs infectious etiology  uti Differential Diagnosis

## 2024-12-22 NOTE — ED PROVIDER NOTE - CARE PROVIDER_API CALL
Brenton Duckworth  Internal Medicine  [address] [place] [address]  Phone: [phone]  Fax: [phone]  Follow Up Time: 1-3 Days   Brenton Duckworth  Internal Medicine  [address] [place] [address]  Phone: [phone]  Fax: [phone]  Follow Up Time: 1-3 Days    Kumar Hawthorne  Plastic Surgery  [address] [place] [id number]  Phone: [phone]  Fax: [phone]  Follow Up Time: 4-6 Days

## 2024-12-22 NOTE — ED PROVIDER NOTE - PROGRESS NOTE DETAILS
ED Attending SUNSHINE Donnelly  Pt endorsed to Dr. Shah, please follow up rest of results including imaging, reassess. Dr. Emily Osorio, DO: Facial wounds cleaned and abrasions were dressed, no lacerations requiring repair. Dr. Emily Osorio, DO: Facial wounds cleaned and abrasions were dressed, no lacerations requiring repair. Surgery consulted for left maxilla fracture. Stated nothing to do at this time, can follow up outpatient with Dr. Hawthorne. Dr. Emily Osorio, DO: Multiple attempts to obtain a final read of the max face CT scan were made.  At this time family is persistently eager to be discharged.  They agreed to follow-up with the official results

## 2024-12-22 NOTE — ED PROVIDER NOTE - PHYSICAL EXAMINATION
GENERAL: Well-developed; well-nourished; in no acute distress.   SKIN: multiple abrasions to face  HEAD: Normocephalic; facial abrasions  EYES: 1mm pupils, PERRLA, EOMI, no conjunctival erythema  ENT: No nasal discharge; airway clear. Teeth intact   NECK: Supple; non tender. Normal ROM   CARD: Regular rate and rhythm. S1, S2 normal; no murmurs, gallops, or rubs. 2/4 radial, PT, and DP pulses   RESP: LCTAB; No wheezes, rales, rhonchi, or stridor.  ABD: soft, nontender, nondistended  BACK: no midline tenderness of step-offs  EXT: Normal ROM.  No LE TTP or edema bilaterally.  NEURO: A/ox3, moving all extremities, following commands, grossly unremarkable  PSYCH: Cooperative, appropriate.

## 2024-12-23 LAB
APPEARANCE UR: CLEAR — SIGNIFICANT CHANGE UP
BACTERIA # UR AUTO: ABNORMAL /HPF
BILIRUB UR-MCNC: NEGATIVE — SIGNIFICANT CHANGE UP
CAST: 3 /LPF — SIGNIFICANT CHANGE UP (ref 0–4)
COLOR SPEC: YELLOW — SIGNIFICANT CHANGE UP
DIFF PNL FLD: ABNORMAL
GLUCOSE UR QL: 500 MG/DL
KETONES UR-MCNC: NEGATIVE MG/DL — SIGNIFICANT CHANGE UP
LEUKOCYTE ESTERASE UR-ACNC: ABNORMAL
NITRITE UR-MCNC: POSITIVE
PH UR: 5.5 — SIGNIFICANT CHANGE UP (ref 5–8)
PROT UR-MCNC: 30 MG/DL
RBC CASTS # UR COMP ASSIST: 1 /HPF — SIGNIFICANT CHANGE UP (ref 0–4)
SP GR SPEC: >1.03 — HIGH (ref 1–1.03)
SQUAMOUS # UR AUTO: 0 /HPF — SIGNIFICANT CHANGE UP (ref 0–5)
UROBILINOGEN FLD QL: 0.2 MG/DL — SIGNIFICANT CHANGE UP (ref 0.2–1)
WBC UR QL: 95 /HPF — HIGH (ref 0–5)
YEAST-LIKE CELLS: PRESENT

## 2024-12-23 PROCEDURE — 74177 CT ABD & PELVIS W/CONTRAST: CPT | Mod: 26,MC

## 2024-12-23 PROCEDURE — 71260 CT THORAX DX C+: CPT | Mod: 26,MC

## 2024-12-23 RX ORDER — CEFPODOXIME PROXETIL 100 MG/5ML
100 GRANULE, FOR SUSPENSION ORAL ONCE
Refills: 0 | Status: COMPLETED | OUTPATIENT
Start: 2024-12-23 | End: 2024-12-23

## 2024-12-23 RX ORDER — CEFPODOXIME PROXETIL 100 MG/5ML
1 GRANULE, FOR SUSPENSION ORAL
Qty: 14 | Refills: 0
Start: 2024-12-23 | End: 2024-12-29

## 2024-12-23 RX ADMIN — CEFPODOXIME PROXETIL 100 MILLIGRAM(S): 100 GRANULE, FOR SUSPENSION ORAL at 05:14

## 2024-12-23 NOTE — ED ADULT NURSE NOTE - NSFALLRISKINTERV_ED_ALL_ED
Assistance OOB with selected safe patient handling equipment if applicable/Assistance with ambulation/Communicate fall risk and risk factors to all staff, patient, and family/Monitor gait and stability/Provide visual cue: yellow wristband, yellow gown, etc/Reinforce activity limits and safety measures with patient and family/Call bell, personal items and telephone in reach/Instruct patient to call for assistance before getting out of bed/chair/stretcher/Non-slip footwear applied when patient is off stretcher/[place] to call system/Physically safe environment - no spills, clutter or unnecessary equipment/Purposeful Proactive Rounding/Room/bathroom lighting operational, light cord in reach

## 2025-01-23 ENCOUNTER — INPATIENT (INPATIENT)
Facility: HOSPITAL | Age: 89
LOS: 4 days | Discharge: NURSING, REHAB OR LONG-TERM CARE | DRG: 690 | End: 2025-01-28
Attending: INTERNAL MEDICINE | Admitting: FAMILY MEDICINE
Payer: MEDICARE

## 2025-01-23 VITALS
RESPIRATION RATE: 18 BRPM | HEIGHT: 62 IN | DIASTOLIC BLOOD PRESSURE: 73 MMHG | SYSTOLIC BLOOD PRESSURE: 161 MMHG | OXYGEN SATURATION: 96 % | WEIGHT: 115.08 LBS | TEMPERATURE: 98 F | HEART RATE: 83 BPM
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
## 2025-01-23 DIAGNOSIS — Z90.10 ACQUIRED ABSENCE OF UNSPECIFIED BREAST AND NIPPLE: Chronic | ICD-10-CM

## 2025-01-23 LAB
ALBUMIN SERPL ELPH-MCNC: 4.1 G/DL — SIGNIFICANT CHANGE UP (ref 3.5–5.2)
ALP SERPL-CCNC: 72 U/L — SIGNIFICANT CHANGE UP (ref 30–115)
ALT FLD-CCNC: 18 U/L — SIGNIFICANT CHANGE UP (ref 0–41)
ANION GAP SERPL CALC-SCNC: 17 MMOL/L — HIGH (ref 7–14)
APPEARANCE UR: CLEAR — SIGNIFICANT CHANGE UP
AST SERPL-CCNC: 26 U/L — SIGNIFICANT CHANGE UP (ref 0–41)
B-OH-BUTYR SERPL-SCNC: 0.8 MMOL/L — HIGH
BACTERIA # UR AUTO: ABNORMAL /HPF
BASE EXCESS BLDV CALC-SCNC: -0.2 MMOL/L — SIGNIFICANT CHANGE UP (ref -2–3)
BASOPHILS # BLD AUTO: 0.02 K/UL — SIGNIFICANT CHANGE UP (ref 0–0.2)
BASOPHILS NFR BLD AUTO: 0.3 % — SIGNIFICANT CHANGE UP (ref 0–1)
BILIRUB SERPL-MCNC: 0.3 MG/DL — SIGNIFICANT CHANGE UP (ref 0.2–1.2)
BILIRUB UR-MCNC: NEGATIVE — SIGNIFICANT CHANGE UP
BUN SERPL-MCNC: 14 MG/DL — SIGNIFICANT CHANGE UP (ref 10–20)
CA-I SERPL-SCNC: 1.18 MMOL/L — SIGNIFICANT CHANGE UP (ref 1.15–1.33)
CALCIUM SERPL-MCNC: 9.4 MG/DL — SIGNIFICANT CHANGE UP (ref 8.4–10.5)
CHLORIDE SERPL-SCNC: 97 MMOL/L — LOW (ref 98–110)
CO2 SERPL-SCNC: 21 MMOL/L — SIGNIFICANT CHANGE UP (ref 17–32)
COLOR SPEC: YELLOW — SIGNIFICANT CHANGE UP
CREAT SERPL-MCNC: 1 MG/DL — SIGNIFICANT CHANGE UP (ref 0.7–1.5)
DIFF PNL FLD: ABNORMAL
EGFR: 54 ML/MIN/1.73M2 — LOW
EOSINOPHIL # BLD AUTO: 0.03 K/UL — SIGNIFICANT CHANGE UP (ref 0–0.7)
EOSINOPHIL NFR BLD AUTO: 0.4 % — SIGNIFICANT CHANGE UP (ref 0–8)
EPI CELLS # UR: PRESENT
GAS PNL BLDV: 131 MMOL/L — LOW (ref 136–145)
GAS PNL BLDV: SIGNIFICANT CHANGE UP
GLUCOSE BLDC GLUCOMTR-MCNC: 320 MG/DL — HIGH (ref 70–99)
GLUCOSE SERPL-MCNC: 385 MG/DL — HIGH (ref 70–99)
GLUCOSE UR QL: >=1000 MG/DL
HCO3 BLDV-SCNC: 26 MMOL/L — SIGNIFICANT CHANGE UP (ref 22–29)
HCT VFR BLD CALC: 33.7 % — LOW (ref 37–47)
HCT VFR BLDA CALC: 60 % — CRITICAL HIGH (ref 34.5–46.5)
HGB BLD CALC-MCNC: 20 G/DL — CRITICAL HIGH (ref 11.7–16.1)
HGB BLD-MCNC: 11 G/DL — LOW (ref 12–16)
IMM GRANULOCYTES NFR BLD AUTO: 0.4 % — HIGH (ref 0.1–0.3)
KETONES UR-MCNC: ABNORMAL MG/DL
LACTATE BLDV-MCNC: 2.8 MMOL/L — HIGH (ref 0.5–2)
LACTATE SERPL-SCNC: 2.2 MMOL/L — HIGH (ref 0.7–2)
LEUKOCYTE ESTERASE UR-ACNC: ABNORMAL
LYMPHOCYTES # BLD AUTO: 0.85 K/UL — LOW (ref 1.2–3.4)
LYMPHOCYTES # BLD AUTO: 11.6 % — LOW (ref 20.5–51.1)
MAGNESIUM SERPL-MCNC: 1.6 MG/DL — LOW (ref 1.8–2.4)
MCHC RBC-ENTMCNC: 27.4 PG — SIGNIFICANT CHANGE UP (ref 27–31)
MCHC RBC-ENTMCNC: 32.6 G/DL — SIGNIFICANT CHANGE UP (ref 32–37)
MCV RBC AUTO: 84 FL — SIGNIFICANT CHANGE UP (ref 81–99)
MONOCYTES # BLD AUTO: 0.37 K/UL — SIGNIFICANT CHANGE UP (ref 0.1–0.6)
MONOCYTES NFR BLD AUTO: 5.1 % — SIGNIFICANT CHANGE UP (ref 1.7–9.3)
NEUTROPHILS # BLD AUTO: 6.01 K/UL — SIGNIFICANT CHANGE UP (ref 1.4–6.5)
NEUTROPHILS NFR BLD AUTO: 82.2 % — HIGH (ref 42.2–75.2)
NITRITE UR-MCNC: POSITIVE
NRBC # BLD: 0 /100 WBCS — SIGNIFICANT CHANGE UP (ref 0–0)
NRBC BLD-RTO: 0 /100 WBCS — SIGNIFICANT CHANGE UP (ref 0–0)
PCO2 BLDV: 46 MMHG — HIGH (ref 39–42)
PH BLDV: 7.36 — SIGNIFICANT CHANGE UP (ref 7.32–7.43)
PH UR: 6.5 — SIGNIFICANT CHANGE UP (ref 5–8)
PLATELET # BLD AUTO: 168 K/UL — SIGNIFICANT CHANGE UP (ref 130–400)
PMV BLD: 8.6 FL — SIGNIFICANT CHANGE UP (ref 7.4–10.4)
PO2 BLDV: 38 MMHG — SIGNIFICANT CHANGE UP (ref 25–45)
POTASSIUM BLDV-SCNC: 4.2 MMOL/L — SIGNIFICANT CHANGE UP (ref 3.5–5.1)
POTASSIUM SERPL-MCNC: 4.6 MMOL/L — SIGNIFICANT CHANGE UP (ref 3.5–5)
POTASSIUM SERPL-SCNC: 4.6 MMOL/L — SIGNIFICANT CHANGE UP (ref 3.5–5)
PROT SERPL-MCNC: 6.8 G/DL — SIGNIFICANT CHANGE UP (ref 6–8)
PROT UR-MCNC: NEGATIVE MG/DL — SIGNIFICANT CHANGE UP
RBC # BLD: 4.01 M/UL — LOW (ref 4.2–5.4)
RBC # FLD: 13.2 % — SIGNIFICANT CHANGE UP (ref 11.5–14.5)
RBC CASTS # UR COMP ASSIST: 3 /HPF — SIGNIFICANT CHANGE UP (ref 0–4)
SAO2 % BLDV: 60.4 % — LOW (ref 67–88)
SODIUM SERPL-SCNC: 135 MMOL/L — SIGNIFICANT CHANGE UP (ref 135–146)
SP GR SPEC: 1.01 — SIGNIFICANT CHANGE UP (ref 1–1.03)
UROBILINOGEN FLD QL: 0.2 MG/DL — SIGNIFICANT CHANGE UP (ref 0.2–1)
WBC # BLD: 7.31 K/UL — SIGNIFICANT CHANGE UP (ref 4.8–10.8)
WBC # FLD AUTO: 7.31 K/UL — SIGNIFICANT CHANGE UP (ref 4.8–10.8)
WBC UR QL: 35 /HPF — HIGH (ref 0–5)

## 2025-01-23 PROCEDURE — 73552 X-RAY EXAM OF FEMUR 2/>: CPT | Mod: 26,50

## 2025-01-23 PROCEDURE — 73562 X-RAY EXAM OF KNEE 3: CPT | Mod: 26,50

## 2025-01-23 PROCEDURE — 99285 EMERGENCY DEPT VISIT HI MDM: CPT

## 2025-01-23 PROCEDURE — 83605 ASSAY OF LACTIC ACID: CPT

## 2025-01-23 PROCEDURE — 99223 1ST HOSP IP/OBS HIGH 75: CPT

## 2025-01-23 PROCEDURE — 93880 EXTRACRANIAL BILAT STUDY: CPT

## 2025-01-23 PROCEDURE — 83036 HEMOGLOBIN GLYCOSYLATED A1C: CPT

## 2025-01-23 PROCEDURE — 80048 BASIC METABOLIC PNL TOTAL CA: CPT

## 2025-01-23 PROCEDURE — 83615 LACTATE (LD) (LDH) ENZYME: CPT

## 2025-01-23 PROCEDURE — 80053 COMPREHEN METABOLIC PANEL: CPT

## 2025-01-23 PROCEDURE — 72170 X-RAY EXAM OF PELVIS: CPT | Mod: 26

## 2025-01-23 PROCEDURE — 85027 COMPLETE CBC AUTOMATED: CPT

## 2025-01-23 PROCEDURE — 97162 PT EVAL MOD COMPLEX 30 MIN: CPT | Mod: GP

## 2025-01-23 PROCEDURE — 71045 X-RAY EXAM CHEST 1 VIEW: CPT | Mod: 26

## 2025-01-23 PROCEDURE — 70450 CT HEAD/BRAIN W/O DYE: CPT | Mod: 26

## 2025-01-23 PROCEDURE — 83735 ASSAY OF MAGNESIUM: CPT

## 2025-01-23 PROCEDURE — 85025 COMPLETE CBC W/AUTO DIFF WBC: CPT

## 2025-01-23 PROCEDURE — 93306 TTE W/DOPPLER COMPLETE: CPT

## 2025-01-23 PROCEDURE — 36415 COLL VENOUS BLD VENIPUNCTURE: CPT

## 2025-01-23 PROCEDURE — 82962 GLUCOSE BLOOD TEST: CPT

## 2025-01-23 PROCEDURE — 73590 X-RAY EXAM OF LOWER LEG: CPT | Mod: 26,50

## 2025-01-23 RX ORDER — ENOXAPARIN SODIUM 100 MG/ML
40 INJECTION SUBCUTANEOUS EVERY 24 HOURS
Refills: 0 | Status: DISCONTINUED | OUTPATIENT
Start: 2025-01-23 | End: 2025-01-28

## 2025-01-23 RX ORDER — DM/PSEUDOEPHED/ACETAMINOPHEN 10-30-250
25 CAPSULE ORAL ONCE
Refills: 0 | Status: DISCONTINUED | OUTPATIENT
Start: 2025-01-23 | End: 2025-01-28

## 2025-01-23 RX ORDER — ACETAMINOPHEN 160 MG/5ML
650 SUSPENSION ORAL EVERY 6 HOURS
Refills: 0 | Status: DISCONTINUED | OUTPATIENT
Start: 2025-01-23 | End: 2025-01-28

## 2025-01-23 RX ORDER — ATORVASTATIN CALCIUM 80 MG/1
40 TABLET, FILM COATED ORAL AT BEDTIME
Refills: 0 | Status: DISCONTINUED | OUTPATIENT
Start: 2025-01-23 | End: 2025-01-28

## 2025-01-23 RX ORDER — SODIUM CHLORIDE 9 G/ML
1000 INJECTION, SOLUTION INTRAVENOUS
Refills: 0 | Status: DISCONTINUED | OUTPATIENT
Start: 2025-01-23 | End: 2025-01-28

## 2025-01-23 RX ORDER — SENNOSIDES 8.6 MG
2 TABLET ORAL AT BEDTIME
Refills: 0 | Status: DISCONTINUED | OUTPATIENT
Start: 2025-01-23 | End: 2025-01-28

## 2025-01-23 RX ORDER — ACETAMINOPHEN, DIPHENHYDRAMINE HCL, PHENYLEPHRINE HCL 325; 25; 5 MG/1; MG/1; MG/1
5 TABLET ORAL AT BEDTIME
Refills: 0 | Status: DISCONTINUED | OUTPATIENT
Start: 2025-01-23 | End: 2025-01-28

## 2025-01-23 RX ORDER — POLYETHYLENE GLYCOL 3350 17 G/17G
17 POWDER, FOR SOLUTION ORAL DAILY
Refills: 0 | Status: DISCONTINUED | OUTPATIENT
Start: 2025-01-23 | End: 2025-01-28

## 2025-01-23 RX ORDER — BACTERIOSTATIC SODIUM CHLORIDE 0.9 %
500 VIAL (ML) INJECTION ONCE
Refills: 0 | Status: COMPLETED | OUTPATIENT
Start: 2025-01-23 | End: 2025-01-23

## 2025-01-23 RX ORDER — ASPIRIN 81 MG/1
81 TABLET, COATED ORAL DAILY
Refills: 0 | Status: DISCONTINUED | OUTPATIENT
Start: 2025-01-23 | End: 2025-01-28

## 2025-01-23 RX ORDER — NIFEDIPINE 90 MG/1
30 TABLET, FILM COATED, EXTENDED RELEASE ORAL
Refills: 0 | Status: DISCONTINUED | OUTPATIENT
Start: 2025-01-23 | End: 2025-01-28

## 2025-01-23 RX ORDER — MAGNESIUM OXIDE 400 MG
400 TABLET ORAL ONCE
Refills: 0 | Status: COMPLETED | OUTPATIENT
Start: 2025-01-23 | End: 2025-01-23

## 2025-01-23 RX ORDER — DM/PSEUDOEPHED/ACETAMINOPHEN 10-30-250
15 CAPSULE ORAL ONCE
Refills: 0 | Status: DISCONTINUED | OUTPATIENT
Start: 2025-01-23 | End: 2025-01-28

## 2025-01-23 RX ORDER — MAGNESIUM, ALUMINUM HYDROXIDE 200-225/5
30 SUSPENSION, ORAL (FINAL DOSE FORM) ORAL EVERY 4 HOURS
Refills: 0 | Status: DISCONTINUED | OUTPATIENT
Start: 2025-01-23 | End: 2025-01-28

## 2025-01-23 RX ORDER — DM/PSEUDOEPHED/ACETAMINOPHEN 10-30-250
12.5 CAPSULE ORAL ONCE
Refills: 0 | Status: DISCONTINUED | OUTPATIENT
Start: 2025-01-23 | End: 2025-01-28

## 2025-01-23 RX ORDER — CEFTRIAXONE 250 MG/1
1000 INJECTION, POWDER, FOR SOLUTION INTRAMUSCULAR; INTRAVENOUS EVERY 24 HOURS
Refills: 0 | Status: COMPLETED | OUTPATIENT
Start: 2025-01-23 | End: 2025-01-26

## 2025-01-23 RX ORDER — INSULIN LISPRO 100/ML
VIAL (ML) SUBCUTANEOUS
Refills: 0 | Status: DISCONTINUED | OUTPATIENT
Start: 2025-01-23 | End: 2025-01-28

## 2025-01-23 RX ORDER — BACTERIOSTATIC SODIUM CHLORIDE 0.9 %
1000 VIAL (ML) INJECTION ONCE
Refills: 0 | Status: COMPLETED | OUTPATIENT
Start: 2025-01-23 | End: 2025-01-23

## 2025-01-23 RX ORDER — CEFTRIAXONE 250 MG/1
1000 INJECTION, POWDER, FOR SOLUTION INTRAMUSCULAR; INTRAVENOUS ONCE
Refills: 0 | Status: COMPLETED | OUTPATIENT
Start: 2025-01-23 | End: 2025-01-23

## 2025-01-23 RX ORDER — GABAPENTIN 800 MG/1
100 TABLET ORAL THREE TIMES A DAY
Refills: 0 | Status: DISCONTINUED | OUTPATIENT
Start: 2025-01-23 | End: 2025-01-28

## 2025-01-23 RX ORDER — MAGNESIUM SULFATE 0.8 MEQ/ML
2 AMPUL (ML) INJECTION ONCE
Refills: 0 | Status: COMPLETED | OUTPATIENT
Start: 2025-01-23 | End: 2025-01-24

## 2025-01-23 RX ORDER — ONDANSETRON 4 MG/1
4 TABLET, ORALLY DISINTEGRATING ORAL EVERY 8 HOURS
Refills: 0 | Status: DISCONTINUED | OUTPATIENT
Start: 2025-01-23 | End: 2025-01-28

## 2025-01-23 RX ORDER — GLUCAGON 3 MG/1
1 POWDER NASAL ONCE
Refills: 0 | Status: DISCONTINUED | OUTPATIENT
Start: 2025-01-23 | End: 2025-01-28

## 2025-01-23 RX ORDER — ANTISEPTIC SURGICAL SCRUB 0.04 MG/ML
1 SOLUTION TOPICAL
Refills: 0 | Status: DISCONTINUED | OUTPATIENT
Start: 2025-01-23 | End: 2025-01-28

## 2025-01-23 RX ORDER — BACTERIOSTATIC SODIUM CHLORIDE 0.9 %
1000 VIAL (ML) INJECTION
Refills: 0 | Status: DISCONTINUED | OUTPATIENT
Start: 2025-01-23 | End: 2025-01-28

## 2025-01-23 RX ADMIN — Medication 10 UNIT(S): at 21:52

## 2025-01-23 RX ADMIN — Medication 500 MILLILITER(S): at 23:25

## 2025-01-23 RX ADMIN — Medication 1000 MILLILITER(S): at 22:32

## 2025-01-23 RX ADMIN — Medication 2000 MILLILITER(S): at 18:45

## 2025-01-23 RX ADMIN — Medication 400 MILLIGRAM(S): at 20:48

## 2025-01-23 RX ADMIN — CEFTRIAXONE 100 MILLIGRAM(S): 250 INJECTION, POWDER, FOR SOLUTION INTRAMUSCULAR; INTRAVENOUS at 22:42

## 2025-01-23 NOTE — ED ADULT NURSE NOTE - OBJECTIVE STATEMENT
Patient alert and oriented x 4, respirations even symmetrical and unlabored on room air, Normocephalic, normal speech, abdomen nontender at this time. pt present for fall at home now with laceration , Stretcher locked in lowest position, pt educated on how to call nurse, IV site flushed w/ NS. No signs of infiltration noted to site. No signs of acute distress noted, safety maintained. Comfort monitored.

## 2025-01-23 NOTE — ED ADULT NURSE NOTE - NS ED NURSE LEVEL OF CONSCIOUSNESS SPEECH
[facility]    HISTORY OF PRESENT ILLNESS:   Tyra Shin is a 52 year old female who presents today for a left second toe wound. Tyra is diabetic, however under excellent control. 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 History of previous ulcer on dorsal left first toe which has now healed. Concerned that she now has subungual hematoma on the nail of first toe. Wonders if she had some trauma on first and second toes from new orthotics causing rubbing in her shoe.     CURRENT/PREVIOUS DRESSING: silver foam    COMPRESSION/OFFLOADING: Darco shoe, has custom orthotics made at [facility] at the end of last year     DATE WOUND ACQUIRED: ~12/25/19    REVIEW OF SYSTEMS:  CONSTITUTIONAL: Denies fevers or acute illness  CARDIOVASCULAR: Denies peripheral vascular disease, clotting disorders or previous peripheral vascular procedures  ENDOCRINE: Diabetes under excellent control    PAST MEDICAL HISTORY:  has a past medical history of Diabetes (H), Polycystic kidney disease, and Sleep apnea.    SOCIAL HISTORY: manager at [employer]  TOBACCO STATUS:  reports that she has never smoked. She does not have any smokeless tobacco history on file.    MEDICATIONS:   Current Outpatient Medications   Medication     ACCU-CHEK LEONIE PLUS test strip     acetaminophen (TYLENOL) 325 MG tablet     aspirin (ASA) 81 MG tablet     azithromycin (ZITHROMAX) 250 MG tablet     B-D U/F 31G X 8 MM insulin pen needle     benzonatate (TESSALON) 100 MG capsule     betamethasone dipropionate (DIPROSONE) 0.05 % external lotion     blood glucose (NO BRAND SPECIFIED) test strip     blood glucose (ONETOUCH ULTRA) test strip     blood glucose monitoring (ONE TOUCH ULTRA 2) meter device kit     blood glucose monitoring (ONE TOUCH ULTRASOFT) lancets     Calcium Carbonate-Vit D-Min (CALCIUM 1200 PO)     cephALEXin (KEFLEX) 250 MG capsule     clotrimazole (LOTRIMIN) 1 % external cream     doxycycline hyclate (VIBRAMYCIN) 100 MG  "capsule     doxycycline monohydrate (MONODOX) 100 MG capsule     dulaglutide (TRULICITY) 0.75 MG/0.5ML pen     fish oil-omega-3 fatty acids 1000 MG capsule     HYDROcodone-acetaminophen (NORCO) 5-325 MG tablet     insulin glargine (BASAGLAR KWIKPEN) 100 UNIT/ML pen     insulin pen needle (B-D U/F) 31G X 8 MM miscellaneous     Lancets MISC     LORazepam (ATIVAN) 0.5 MG tablet     Multiple Vitamins-Calcium (ONE-A-DAY WOMENS FORMULA) TABS     nystatin-triamcinolone (MYCOLOG) 611088-2.1 UNIT/GM-% external ointment     ondansetron (ZOFRAN-ODT) 4 MG ODT tab     ondansetron (ZOFRAN-ODT) 8 MG ODT tab     ondansetron (ZOFRAN-ODT) 8 MG ODT tab     order for DME     primidone (MYSOLINE) 50 MG tablet     vitamin C (ASCORBIC ACID) 100 MG tablet     vitamin D2 (ERGOCALCIFEROL) 90794 units (1250 mcg) capsule     No current facility-administered medications for this encounter.      VITALS: BP (!) 140/76   Pulse 87   Temp 98.2  F (36.8  C) (Temporal)   Resp 19   Ht 1.651 m (5' 5\")   Wt 95.3 kg (210 lb)   BMI 34.95 kg/m       PHYSICAL EXAM:  GENERAL: Patient is alert and oriented and in no acute distress  CV: 2+ DP and PT pulses  INTEGUMENTARY:   Wound (used by [facility] only) 01/15/20 1424 Left dorsal 2 toe pressure injury (Active)   Length (cm) 0.7 1/15/2020  2:00 PM   Width (cm) 0.3 1/15/2020  2:00 PM   Depth (cm) 0.1 1/15/2020  2:00 PM   Wound (cm^2) 0.21 cm^2 1/15/2020  2:00 PM   Wound Volume (cm^3) 0.02 cm^3 1/15/2020  2:00 PM   Dressing Appearance moist drainage 1/15/2020  2:00 PM   Drainage Characteristics/Odor serosanguineous 1/15/2020  2:00 PM   Drainage Amount moderate 1/15/2020  2:00 PM   Thickness/Stage full thickness 1/15/2020  2:30 PM   Base slough 1/15/2020  2:30 PM   Periwound intact 1/15/2020  2:30 PM   Periwound Temperature warm 1/15/2020  2:30 PM   Care, Wound debrided 1/15/2020  2:30 PM           PROCEDURE: 4% topical lidocaine was applied to the wound by nursing staff. Patient was determined to be capable of " making their own medical decisions and informed consent was obtained. Using a 15 blade a surgical debridement was performed down to and including subcutaneous tissue of <20 cm2. Hemostasis was achieved with pressure. The patient tolerated the procedure well.      ASSESSMENT:   1. 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 Adherence to plan of care - unknown as this is initial assessment    PLAN/DISCUSSION:   1. Wound care plan: dress daily with iodosorb and bandaid  2. Wear soft shoes with large toebox - Darco shoe ok  3. Follow-up with MPO regarding insoles  4. X-ray to rule out osteo    FOLLOW-UP: 2 weeks    MARLENI MOORE PA-C     Speaking Coherently

## 2025-01-23 NOTE — H&P ADULT - NSHPLABSRESULTS_GEN_ALL_CORE
11.0   7.31  )-----------( 168      ( 2025 18:40 )             33.7           135  |  97[L]  |  14  ----------------------------<  385[H]  4.6   |  21  |  1.0    Ca    9.4      2025 18:40  Mg     1.6         TPro  6.8  /  Alb  4.1  /  TBili  0.3  /  DBili  x   /  AST  26  /  ALT  18  /  AlkPhos  72                Urinalysis Basic - ( 2025 20:42 )    Color: Yellow / Appearance: Clear / S.011 / pH: x  Gluc: x / Ketone: Trace mg/dL  / Bili: Negative / Urobili: 0.2 mg/dL   Blood: x / Protein: Negative mg/dL / Nitrite: Positive   Leuk Esterase: Moderate / RBC: 3 /HPF / WBC 35 /HPF   Sq Epi: x / Non Sq Epi: x / Bacteria: Too Numerous to count /HPF            Lactate Trend            CAPILLARY BLOOD GLUCOSE      POCT Blood Glucose.: 320 mg/dL (2025 21:30)

## 2025-01-23 NOTE — ED ADULT NURSE NOTE - ISOLATION TYPE:
Caller: Patient    Callback phone number: [phone]    Reason for call: Patient wants to have her Echo results given to her verbally.     Did you confirm message with caller? Yes    Can a detailed message be left: Yes     None

## 2025-01-23 NOTE — ED ADULT NURSE NOTE - CHIEF COMPLAINT QUOTE
Pt fell and has a laceration to left leg.  As per EMS FS above 500 and /110.  Will repeat FS.  BP now 161/73

## 2025-01-23 NOTE — ED PROVIDER NOTE - OBJECTIVE STATEMENT
Patient BIBA from home, fall outside today taking out garbage,  H/o frequent falls, last fell 3 weeks, Uses walker but family states she does not walk well with walker and may not use it at times, Patient c/o abrasions to legs, denies head inj, no chest pain, no SOB, States she fell onto knees today,

## 2025-01-23 NOTE — H&P ADULT - HISTORY OF PRESENT ILLNESS
89-year-old female c/o dizziness and fell. Pt now c/o also of unable to ambulate. Pt with  past medical history of diabetes CVA hypertension CAD on aspirin Plavix presents here after mechanical fall.  Patient states it similar to her previous fall that she was here about 3 to 4 weeks ago where she was taking out the trash missed a curb and fell onto her left lower extremity.  Patient denies hitting her head ever patient states that she felt very lightheaded and dizzy after the fall and was unable to ambulate.  Patient denies any neck pain back pain abdominal  pain.

## 2025-01-23 NOTE — H&P ADULT - ASSESSMENT
89-year-old female c/o dizziness and fell. Pt now c/o also of unable to ambulate. Pt with  past medical history of diabetes CVA hypertension CAD on aspirin Plavix presents here after mechanical fall.  Patient states it similar to her previous fall that she was here about 3 to 4 weeks ago where she was taking out the trash missed a curb and fell onto her left lower extremity.  Patient denies hitting her head ever patient states that she felt very lightheaded and dizzy after the fall and was unable to ambulate.  Patient denies any neck pain back pain abdominal  pain.      DX: Fall / dizziness:Pt with HX of CVA  continue meclizine  neurology consult    unable to ambulate/ fall  Pt consult/ unable to ambulate    sepsis  UTI:  elevated lactate  F/U lactate level  IVF @ 75 ml/H  IV rocephin  ID consult    Hypomagnesemia:  replaced MAg  F/Y AM mag level    continue home meds as per pMD:    HTN:   nifedipine  lisinopril    CAD:  CC/W Plavix    Dyslipidemia:  c/W statin    DM:  hold metformin  ISS  FS ACHS      neuropathic pain:  C/W neurontin    Prophyaxis GI/VTE    CAse D/W DR Barnett 89-year-old female c/o dizziness and fell. Pt now c/o also of unable to ambulate. Pt with  past medical history of diabetes CVA hypertension CAD on aspirin Plavix presents here after mechanical fall.  Patient states it similar to her previous fall that she was here about 3 to 4 weeks ago where she was taking out the trash missed a curb and fell onto her left lower extremity.  Patient denies hitting her head ever patient states that she felt very lightheaded and dizzy after the fall and was unable to ambulate.  Patient denies any neck pain back pain abdominal  pain.      #Fall / dizziness:Pt with HX of CVA  continue meclizine  neurology consult  head CT scan shows no acute process  unable to ambulate/ fall  Pt consult/ unable to ambulate    #sepsis  UTI:  elevated lactate  F/U lactate level  IVF @ 75 ml/H  IV rocephin  ID consult    #Hypomagnesemia:  replaced MAg  F/Y AM mag level       #HTN:   nifedipine  lisinopril  monitor BP    #CAD:  CC/W Plavix    #Dyslipidemia:  c/W statin    #DM:  hold metformin  ISS  FS ACHS      #neuropathic pain:  C/W neurontin    Prophyaxis GI/VTE    CAse D/W DR Barnett    #Progress Note Handoff  Pending (specify):  as above   Family discussion:  plan of care was discussed with patient   in details.  all questions were answered.  seems to understand, and in agreement  Disposition:  PT   89-year-old female c/o dizziness and fell. Pt now c/o also of unable to ambulate. Pt with  past medical history of diabetes CVA hypertension CAD on aspirin Plavix presents here after mechanical fall.  Patient states it similar to her previous fall that she was here about 3 to 4 weeks ago where she was taking out the trash missed a curb and fell onto her left lower extremity.  Patient denies hitting her head ever patient states that she felt very lightheaded and dizzy after the fall and was unable to ambulate.  Patient denies any neck pain back pain abdominal  pain.      #Fall / dizziness:Pt with HX of CVA  abnormal CT scan discussed with neurosurgery who recommended to obtain neurology consult   continue meclizine  neurology consult  unable to ambulate/ fall  Pt consult/ unable to ambulate    #sepsis  UTI:  elevated lactate  F/U lactate level  IVF @ 75 ml/H  IV rocephin  ID consult    #Hypomagnesemia:  replaced MAg  F/Y AM mag level       #HTN:   nifedipine  lisinopril  monitor BP    #CAD:  CC/W Plavix    #Dyslipidemia:  c/W statin    #DM:  hold metformin  ISS  FS ACHS      #neuropathic pain:  C/W neurontin    Prophyaxis GI/VTE    CAse D/W DR Barnett    #Progress Note Handoff  Pending (specify):  as above   Family discussion:  plan of care was discussed with patient   in details.  all questions were answered.  seems to understand, and in agreement  Disposition:  PT

## 2025-01-23 NOTE — ED PROVIDER NOTE - CLINICAL SUMMARY MEDICAL DECISION MAKING FREE TEXT BOX
Patient with generalized weakness found to have a UTI unable to ambulate chronic findings on imaging as above, will admit

## 2025-01-23 NOTE — ED ADULT TRIAGE NOTE - CHIEF COMPLAINT QUOTE
Pt felt dizzy and fell Pt fell and has a laceration to left leg Pt fell and has a laceration to left leg.  As per EMS FS above 500 and /110.  Will repeat FS.  BP now 161/73

## 2025-01-23 NOTE — ED PROVIDER NOTE - PROGRESS NOTE DETAILS
Authored by Dr. Noe Pelletier patient s.o to dr. hunter pending imaging as patient felt lightheaded unsteady when she ambulates Patient unable to ambulate in ED without assistance, not safe to use walker, Needs admission to rehab eval

## 2025-01-23 NOTE — ED PROVIDER NOTE - NEUROLOGICAL, MLM
Patient alert, no focal weakness, attempted ambulation in ED, Patient very unsteady, unable to ambulate with walker,,,

## 2025-01-23 NOTE — ED PROVIDER NOTE - ATTENDING APP SHARED VISIT CONTRIBUTION OF CARE
89-year-old female past medical history of diabetes CVA hypertension CAD on aspirin Plavix presents here after mechanical fall.  Patient states it similar to her previous fall that she was here about 3 to 4 weeks ago where she was taking out the trash missed a curb and fell onto her left lower extremity.  Patient denies hitting her head ever patient states that she felt very lightheaded and dizzy after the fall and was unable to ambulate.  Patient denies any neck pain back pain abdominal pain  CONSTITUTIONAL: WA / WN / NAD  HEAD: NCAT  EYES: PERRL; EOMI;   ENT: Normal pharynx; mucous membranes pink/moist, no erythema.  NECK: Supple; no meningeal signs  CARD: RRR; nl S1/S2; no M/R/G.   RESP: Respiratory rate and effort are normal; breath sounds clear and equal bilaterally.  ABD: Soft, NT ND   MSK/EXT: b/l lower extremity skin tears . +redness to left upper back(states is old from previous mohs surgery). NO midline ctls   SKIN: Warm and dry;   NEURO: AAOx3,  PSYCH: Memory Intact, Normal Affect

## 2025-01-23 NOTE — ED PROVIDER NOTE - MUSCULOSKELETAL, MLM
no spinal tenderness, FROM, abrasion and superficial skin tears to legs, no sutures needs, wound cleaned and dressed,

## 2025-01-23 NOTE — ED PROVIDER NOTE - CARE PLAN
1 Principal Discharge DX:	Fall  Secondary Diagnosis:	Multiple skin tears  Secondary Diagnosis:	Unable to ambulate  Secondary Diagnosis:	Acute UTI  Secondary Diagnosis:	Hyperglycemia

## 2025-01-24 ENCOUNTER — RESULT REVIEW (OUTPATIENT)
Age: 89
End: 2025-01-24

## 2025-01-24 LAB
A1C WITH ESTIMATED AVERAGE GLUCOSE RESULT: 10.4 % — HIGH (ref 4–5.6)
ANION GAP SERPL CALC-SCNC: 10 MMOL/L — SIGNIFICANT CHANGE UP (ref 7–14)
BUN SERPL-MCNC: 12 MG/DL — SIGNIFICANT CHANGE UP (ref 10–20)
CALCIUM SERPL-MCNC: 8.9 MG/DL — SIGNIFICANT CHANGE UP (ref 8.4–10.5)
CHLORIDE SERPL-SCNC: 107 MMOL/L — SIGNIFICANT CHANGE UP (ref 98–110)
CO2 SERPL-SCNC: 26 MMOL/L — SIGNIFICANT CHANGE UP (ref 17–32)
CREAT SERPL-MCNC: 0.9 MG/DL — SIGNIFICANT CHANGE UP (ref 0.7–1.5)
EGFR: 61 ML/MIN/1.73M2 — SIGNIFICANT CHANGE UP
ESTIMATED AVERAGE GLUCOSE: 252 MG/DL — HIGH (ref 68–114)
GLUCOSE BLDC GLUCOMTR-MCNC: 141 MG/DL — HIGH (ref 70–99)
GLUCOSE BLDC GLUCOMTR-MCNC: 157 MG/DL — HIGH (ref 70–99)
GLUCOSE SERPL-MCNC: 78 MG/DL — SIGNIFICANT CHANGE UP (ref 70–99)
HCT VFR BLD CALC: 32.4 % — LOW (ref 37–47)
HGB BLD-MCNC: 10.4 G/DL — LOW (ref 12–16)
LDH SERPL L TO P-CCNC: 211 — SIGNIFICANT CHANGE UP (ref 50–242)
MAGNESIUM SERPL-MCNC: 2.5 MG/DL — HIGH (ref 1.8–2.4)
MCHC RBC-ENTMCNC: 27.4 PG — SIGNIFICANT CHANGE UP (ref 27–31)
MCHC RBC-ENTMCNC: 32.1 G/DL — SIGNIFICANT CHANGE UP (ref 32–37)
MCV RBC AUTO: 85.5 FL — SIGNIFICANT CHANGE UP (ref 81–99)
NRBC # BLD: 0 /100 WBCS — SIGNIFICANT CHANGE UP (ref 0–0)
NRBC BLD-RTO: 0 /100 WBCS — SIGNIFICANT CHANGE UP (ref 0–0)
PLATELET # BLD AUTO: 151 K/UL — SIGNIFICANT CHANGE UP (ref 130–400)
PMV BLD: 9.1 FL — SIGNIFICANT CHANGE UP (ref 7.4–10.4)
POTASSIUM SERPL-MCNC: 4.2 MMOL/L — SIGNIFICANT CHANGE UP (ref 3.5–5)
POTASSIUM SERPL-SCNC: 4.2 MMOL/L — SIGNIFICANT CHANGE UP (ref 3.5–5)
RBC # BLD: 3.79 M/UL — LOW (ref 4.2–5.4)
RBC # FLD: 13.2 % — SIGNIFICANT CHANGE UP (ref 11.5–14.5)
SODIUM SERPL-SCNC: 143 MMOL/L — SIGNIFICANT CHANGE UP (ref 135–146)
WBC # BLD: 8.82 K/UL — SIGNIFICANT CHANGE UP (ref 4.8–10.8)
WBC # FLD AUTO: 8.82 K/UL — SIGNIFICANT CHANGE UP (ref 4.8–10.8)

## 2025-01-24 PROCEDURE — 99223 1ST HOSP IP/OBS HIGH 75: CPT

## 2025-01-24 PROCEDURE — 99232 SBSQ HOSP IP/OBS MODERATE 35: CPT

## 2025-01-24 PROCEDURE — 93880 EXTRACRANIAL BILAT STUDY: CPT | Mod: 26

## 2025-01-24 PROCEDURE — 93306 TTE W/DOPPLER COMPLETE: CPT | Mod: 26

## 2025-01-24 RX ADMIN — NIFEDIPINE 30 MILLIGRAM(S): 90 TABLET, FILM COATED, EXTENDED RELEASE ORAL at 05:19

## 2025-01-24 RX ADMIN — CEFTRIAXONE 100 MILLIGRAM(S): 250 INJECTION, POWDER, FOR SOLUTION INTRAMUSCULAR; INTRAVENOUS at 23:25

## 2025-01-24 RX ADMIN — ENOXAPARIN SODIUM 40 MILLIGRAM(S): 100 INJECTION SUBCUTANEOUS at 17:57

## 2025-01-24 RX ADMIN — GABAPENTIN 100 MILLIGRAM(S): 800 TABLET ORAL at 14:01

## 2025-01-24 RX ADMIN — POLYETHYLENE GLYCOL 3350 17 GRAM(S): 17 POWDER, FOR SOLUTION ORAL at 13:09

## 2025-01-24 RX ADMIN — Medication 10 MILLIGRAM(S): at 05:19

## 2025-01-24 RX ADMIN — Medication 75 MILLILITER(S): at 01:52

## 2025-01-24 RX ADMIN — ASPIRIN 81 MILLIGRAM(S): 81 TABLET, COATED ORAL at 13:08

## 2025-01-24 RX ADMIN — ATORVASTATIN CALCIUM 40 MILLIGRAM(S): 80 TABLET, FILM COATED ORAL at 21:11

## 2025-01-24 RX ADMIN — ACETAMINOPHEN 650 MILLIGRAM(S): 160 SUSPENSION ORAL at 07:30

## 2025-01-24 RX ADMIN — Medication 75 MILLIGRAM(S): at 13:09

## 2025-01-24 RX ADMIN — Medication 25 MILLIGRAM(S): at 02:03

## 2025-01-24 RX ADMIN — Medication 25 GRAM(S): at 01:54

## 2025-01-24 RX ADMIN — Medication 6: at 16:47

## 2025-01-24 RX ADMIN — ACETAMINOPHEN 650 MILLIGRAM(S): 160 SUSPENSION ORAL at 06:43

## 2025-01-24 RX ADMIN — GABAPENTIN 100 MILLIGRAM(S): 800 TABLET ORAL at 21:11

## 2025-01-24 RX ADMIN — GABAPENTIN 100 MILLIGRAM(S): 800 TABLET ORAL at 05:18

## 2025-01-24 RX ADMIN — ANTISEPTIC SURGICAL SCRUB 1 APPLICATION(S): 0.04 SOLUTION TOPICAL at 05:20

## 2025-01-24 RX ADMIN — Medication 6: at 12:14

## 2025-01-24 NOTE — CONSULT NOTE ADULT - ASSESSMENT
High risk for pressure injury development or progression   Skin assessed- B/L lower extremity partial thickness ulcerations - pt s/p fall at home                         Sacrum, B/L buttock fissure with moisture associated dermatitis                           B/L heel blanchable redness       Wound and skin care recs   Clean lower extremity wounds with saline , pat dry apply xeroform with Kerlix dressing   Continue skin barrier to sacrum and buttock   Pressure  injury  preventive  measures  Skin  and incontinence care   Assess wound and inform primary provider of any changes   Case discussed with primary Rn  Wound/ ostomy specialist  to f/u as needed     Offloading: [ x] Frequent position changes [ ] Devices/Equipment  Cleansing: [ ] Saline [x ] Soap/Water [ ] Other: ______  Topicals: [ x] Barrier Cream [ ] Antimicrobial [ ] Enzymatic Wound Debridement [] Moist  wound Healing   Dressings: [ ] Dry, sterile [ ] Allevyn  Foam [ ] Absorbant Pads [ ] Collagenase    Other Recs.   Per Primary team   Total time for bedside assessment  , review of medical records  and  discussion of plan of care with primary team greater than 35 min

## 2025-01-24 NOTE — PROVIDER CONTACT NOTE (OTHER) - SITUATION
Pt admitted from ED. S/p fall at home, positive for UTI. After assessment pt c/o sudden dizziness and feeling "not herself"

## 2025-01-24 NOTE — PATIENT PROFILE ADULT - FUNCTIONAL ASSESSMENT - DAILY ACTIVITY 1.
Nursing Home Discharge Summary  [facility]    Patient: Mira Lees   YOB: 1937   MR Number: [id number]     Date of Admission: 10/9/2020   Discharge Date: 10/12/2020   Attending Physician: Paolo Baum DO     Primary Care Provider:  Myke Frye MD    Disposition:  Skilled Nursing Facility: [facility]    Admitting Physician:  Paolo Baum DO   Discharge Physician:  Paolo Baum DO    Primary Discharge Diagnoses:  1. Diverticulitis with abscess    Secondary Discharge Diagnoses:  Active Problems:    * No active hospital problems. *  Resolved Problems:    * No resolved hospital problems. *      Consultations:  IP CONSULT TO SOCIAL WORK  Transfusions:  None  Procedures:  Robotic assisted laparoscopic sigmoid colectomy with appendectomy    Hospital Course:  Patient was admitted for robotic assisted laparoscopic sigmoid colectomy with appendectomy secondary to diverticulitis with abscess.  She underwent an uneventful procedure and recovery.  By the 3rd postoperative day she was up with assistance, tolerating a regular diet and having regular bowel function.  Pain is controlled on oral pain medications.    Code Status:  Full Resuscitation    Discharge Exam:  Vital 24 Hour Range Most Recent Value   Temperature Temp  Min: 97.5 °F (36.4 °C)  Max: 98 °F (36.7 °C) 97.5 °F (36.4 °C)   Pulse Pulse  Min: 85  Max: 92 92   Respiratory Resp  Min: 16  Max: 17 16   Blood Pressure BP  Min: 131/59  Max: 167/83 (!) 167/83   Pulse Oximetry SpO2  Min: 94 %  Max: 95 %    O2 No data recorded      Vital Most Recent Value First Value   Weight 67.1 kg Weight: 68.6 kg   Height 5' 4.5\" (163.8 cm) Height: 5' 4.5\" (163.8 cm)     General Appearance:  Awake, alert, oriented to time, place and person.  In no acute distress.  Lungs:  Clear to auscultation bilaterally, no wheezes, crackles or rubs.  Good respiratory effort.  Cardiovascular:  RRR (Regular rate and rhythm), no murmurs or gallops.   Peripheral pulses equal bilaterally.  No edema of lower extremities bilaterally.  Abdomen:  Normal bowel sounds in all 4 quadrants.  Appropriately tender at the incisions, non-distended.  No hepatosplenomegaly.  All incisions are clean dry and intact  Neurologic:  No focal deficits.  Skin:  No decubitus ulcers    Discharge Labs:  Recent Labs   Lab 10/10/20  0602 10/09/20  0618   SODIUM  --  141   POTASSIUM  --  3.3*   CHLORIDE  --  105   CO2  --  26   BUN  --  11   CREATININE  --  0.75   GLUCOSE  --  61*   WBC 7.8 5.5   HGB 12.2 12.8   HCT 37.3 39.6    190       Microbiology Results     None          Significant Diagnostic Studies and Procedures:   No results found.    Immunization History   Administered Date(s) Administered   • Influenza, high dose seasonal, preservative-free 10/13/2015, 09/26/2017, 10/07/2019   • Influenza, injectable, quadrivalent 10/20/2014   • Influenza, injectable, quadrivalent, preservative-free 10/09/2013, 10/03/2018   • Influenza, seasonal, injectable, trivalent 12/12/2000, 10/22/2004, 10/08/2008, 10/06/2010, 09/26/2012, 10/03/2016   • Pneumococcal Conjugate 13 valent 04/21/2015   • Pneumococcal polysaccharide, adult, 23 valent 10/22/2004, 10/08/2008   • Tdap 07/19/2011   • Zoster Shingles 03/11/2013     ALLERGIES:  Mirtazapine and Codeine    Discharge Medications:  Current Discharge Medication List      START taking these medications    Details   diazePAM (VALIUM) 2 MG tablet Take 1 tablet by mouth every 8 hours as needed (abdominal pain).  Qty: 20 tablet, Refills: 0         CONTINUE these medications which have NOT CHANGED    Details   amLODIPine (NORVASC) 2.5 MG tablet Take 3 tablets by mouth daily.  Qty: 90 tablet, Refills: 3    Associated Diagnoses: Essential hypertension      levothyroxine 100 MCG tablet Take 1 tablet by mouth daily.  Qty: 30 tablet, Refills: 0    Comments: Note change in dose      Nystop 066895 UNIT/GM powder APPLY TOPICALLY 2 TIMES DAILY. UNDER BREASTS.  Qty:  60 g, Refills: 11      polyethylene glycol (MiraLax) 17 GM/SCOOP powder Take 17 g by mouth as needed. 17 gm at bedtime.  Qty: 255 g, Refills: 1      Calcium Carbonate 1500 (600 Ca) MG Tab Take 2 tablets by mouth daily.  Qty: 60 tablet, Refills: 11      aspirin (ECOTRIN) 325 MG EC tablet Take 1 tablet by mouth daily.  Qty: 30 tablet, Refills: 11      Ascorbic Acid (vitamin C) 500 MG tablet Take 1 tablet by mouth daily.  Qty: 30 tablet, Refills: 11      !! acetaminophen (GNP PAIN RELIEF EX-STRENGTH) 500 MG tablet Take 1 tablet by mouth every 6 hours as needed (Arthralgia pain).  Qty: 100 tablet, Refills: 1      !! acetaminophen (TYLENOL) 325 MG tablet Take 2 tablets by mouth every 4 hours as needed for Pain.  Qty: 60 tablet, Refills: 3      albuterol (ProAir HFA) 108 (90 Base) MCG/ACT inhaler Inhale 2 puffs into the lungs every 4 hours as needed for Shortness of Breath or Wheezing (for wheezing).  Qty: 18 g, Refills: 0    Associated Diagnoses: COPD with acute exacerbation (CMS/[facility])      fluticasone (FLONASE) 50 MCG/ACT nasal spray Spray 2 sprays in each nostril daily.  Qty: 48 mL, Refills: 0      fluticasone-salmeterol (ADVAIR, WIXELA) 100-50 MCG/DOSE inhaler Inhale 1 puff into the lungs 2 times daily.  Qty: 60 each, Refills: 0      magnesium hydroxide (Milk of Magnesia) 400 MG/5ML suspension Take 30 mLs by mouth daily as needed for Constipation.  Qty: 354 mL, Refills: 0      Multiple Vitamins-Minerals (CertaVite/Antioxidants) Tab Take 1 tablet by mouth daily.  Qty: 30 tablet, Refills: 0      Omega 3 1000 MG capsule Take 2,000 mg by mouth daily.  Qty: 60 capsule, Refills: 0      omeprazole (PrilOSEC) 20 MG capsule Take 1 capsule by mouth daily.  Qty: 30 capsule, Refills: 0      venlafaxine XR (EFFEXOR XR) 150 MG 24 hr capsule Take 1 capsule by mouth daily.  Qty: 30 capsule, Refills: 0    Comments: All future refills will come from Dr. Frye's office.      zoster vaccine recomb adjuvanted (SHINGRIX) 50 MCG/0.5ML  injection Inject 0.5 mLs into the muscle 1 time. Indications: Second dose to be administered 2-6 months after the initial dose. total of 2 doses.  Qty: 1 each, Refills: 1    Associated Diagnoses: Need for vaccination      Spacer/Aero Chamber Mouthpiece Misc Use as directed with Inhaler  Qty: 1 Units, Refills: 0    Associated Diagnoses: COPD with acute exacerbation (CMS/HCC)       !! - Potential duplicate medications found. Please discuss with provider.          Patient Discharge Instructions/Orders:   1. Activity:  no heavy lifting for 2 weeks, no lifting greater than 10 lb  2. May have pass with family, friends or staff with medications.  3. May have tetanus vaccine if not given in past 10 years.  4. May have annual flu vaccine.  5. May have pneumovax and influenza vaccine per nursing home protocol.  6. May have 2-step TB testing per facility policy.  7. May have  alcoholic beverages for holidays/special events.  8. Acetaminophen 325 mg 2 tabs by mouth every 4 hours PRN pain or fever (max 4 grams/daily).  9. Bowel medications:  1. Milk of Magnesia 10 mL concentrate or 30 mL suspension by mouth daily PRN for constipation (Do not give for patients with creatinine >2.5).  2. Polyethylene glycol (Miralax) 17 G packet, dissolved in 8 ounces of water daily p.r.n. Constipation.  3. Bisacodyl suppository rectally daily PRN constipation.  4. Tap water enema rectally daily PRN constipation.  10. May crush medications as needed unless contraindicated (exceptions per pharmacist).  11. May use liquid medications if necessary.  12. Call physician if SBP (systolic blood pressure) less than 85 or greater than 175.  13. Weight Bearing Status:  Weight Bearing as Tolerated, unless otherwise noted by Orthopedic MD per AVS.  14. Evaluations:      Physical Therapy Evaluation and Treatment:  Yes      Occupational Therapy Evaluation and Treatment: Yes       Speech Therapy Evaluation and Treatment:  No      MSW Evaluation and Treatment:   No  15.  Daily Weight - Call physician if increased >3 pounds in one day or >7 pounds in one week:  Yes  16.  Diet: 3 Times/day Between Meals (am,pm,hs); Ensure Enlive/standard Oral Supplement Oral Nutrition Supplement  Regular Diet  17.  Wound Care:  ice to area for comfort  18.  Central Line/PICC Line Care:  Per protocol.  19.  Skin/Wound Care:  Per Wound Care Nurse recommendations and Nursing home protocols.   20.  I/O cath q 6 hours prn no void.  If post cath urine volume is >300 cc on two caths then notify provider.  21.  Oxygen:  None  22.  Labs:  None  23.  Blood Glucose monitoring:   None        -  Status Inpatient    - I certify that post hospital care is required on daily basis for skilled nursing care or other rehab services that, as a practical matter, can only be provided in a SNF (skilled nursing facility) on an inpatient basis, and the SNF care is needed for a condition for which this individual received inpatient hospital care prior to transfer.     - Communicable Disease Status:   This patient has been screened for the presence of clinically apparent communicable disease that could be transmitted to others, including tuberculosis within 90 days of transfer, or procedures have been ordered to treat and limit the spread of any communicable disease the patient may have been found to have.    Time spent on discharge and coordination of care was 20 minutes.    Follow-up:  Paolo Baum DO  [address] [place] [address]  [phone]    On 10/26/2020  for post-op check      Signed:  Paolo Baum DO  10/12/2020  8:03 AM      3 = A little assistance

## 2025-01-24 NOTE — PATIENT PROFILE ADULT - FALL HARM RISK - HARM RISK INTERVENTIONS
Assistance with ambulation/Assistance OOB with selected safe patient handling equipment/Communicate Risk of Fall with Harm to all staff/Monitor gait and stability/Reinforce activity limits and safety measures with patient and family/Sit up slowly, dangle for a short time, stand at bedside before walking/Tailored Fall Risk Interventions/Visual Cue: Yellow wristband and red socks/Bed in lowest position, wheels locked, appropriate side rails in place/Call bell, personal items and telephone in reach/Instruct patient to call for assistance before getting out of bed or chair/Non-slip footwear when patient is out of bed/[place] to call system/Physically safe environment - no spills, clutter or unnecessary equipment/Purposeful Proactive Rounding/Room/bathroom lighting operational, light cord in reach

## 2025-01-24 NOTE — CONSULT NOTE ADULT - SUBJECTIVE AND OBJECTIVE BOX
NEUROLOGY CONSULT    HPI: 89yo RHF PMH diabetes, HTN, stroke, CAD (on ASA/Plavix) presented to ED s/p mechanical fall. She had a previous fall in December when she tripped over a curb. She fell on her face, fracturing her nose and was hospitalized. Although she has always had episodes of lightheadedness/pre syncope, these increased after that fall. Most recent fall was mechanical as well when she slipped on ice throwing out her garbage. She fell on her knees, no head trauma. She has history of head pains on the right, worse to touch, for around the last year and even though she didn't hit her head, she feels these have been worse since the fall. She denies "spin" sensation. She notes she has had MRI/CT brain in the past as workup for R sided head pains. No recent change in medications. With exception of slightly worsened R sided head pains since most recent fall and being in the hospital, she denies any new neuro symptoms.      MEDICATIONS  Home Medications:  atorvastatin 40 mg oral tablet: 1 orally once a day (at bedtime) (30 Nov 2023 15:30)  gabapentin 100 mg oral capsule: 1 cap(s) orally 3 times a day (30 Nov 2023 15:49)  glimepiride 4 mg oral tablet: 1 tab(s) orally 2 times a day (30 Nov 2023 15:29)  metFORMIN 1000 mg oral tablet: 1 tab(s) orally 2 times a day (30 Nov 2023 15:49)  polyethylene glycol 3350 oral powder for reconstitution: 17 gram(s) orally once a day (30 Nov 2023 15:30)  senna leaf extract oral tablet: 2 tab(s) orally once a day (at bedtime) (30 Nov 2023 15:30)    MEDICATIONS  (STANDING):  aspirin  chewable 81 milliGRAM(s) Oral daily  atorvastatin 40 milliGRAM(s) Oral at bedtime  cefTRIAXone   IVPB 1000 milliGRAM(s) IV Intermittent every 24 hours  chlorhexidine 2% Cloths 1 Application(s) Topical <User Schedule>  clopidogrel Tablet 75 milliGRAM(s) Oral daily  dextrose 5%. 1000 milliLiter(s) (50 mL/Hr) IV Continuous <Continuous>  dextrose 5%. 1000 milliLiter(s) (100 mL/Hr) IV Continuous <Continuous>  dextrose 50% Injectable 25 Gram(s) IV Push once  dextrose 50% Injectable 12.5 Gram(s) IV Push once  dextrose 50% Injectable 25 Gram(s) IV Push once  enoxaparin Injectable 40 milliGRAM(s) SubCutaneous every 24 hours  gabapentin 100 milliGRAM(s) Oral three times a day  glucagon  Injectable 1 milliGRAM(s) IntraMuscular once  insulin lispro (ADMELOG) corrective regimen sliding scale   SubCutaneous three times a day before meals  lisinopril 10 milliGRAM(s) Oral daily  NIFEdipine XL 30 milliGRAM(s) Oral two times a day  polyethylene glycol 3350 17 Gram(s) Oral daily  sodium chloride 0.9%. 1000 milliLiter(s) (75 mL/Hr) IV Continuous <Continuous>    MEDICATIONS  (PRN):  acetaminophen     Tablet .. 650 milliGRAM(s) Oral every 6 hours PRN Temp greater or equal to 38C (100.4F), Mild Pain (1 - 3)  aluminum hydroxide/magnesium hydroxide/simethicone Suspension 30 milliLiter(s) Oral every 4 hours PRN Dyspepsia  dextrose Oral Gel 15 Gram(s) Oral once PRN Blood Glucose LESS THAN 70 milliGRAM(s)/deciliter  meclizine 25 milliGRAM(s) Oral three times a day PRN for dizziness  melatonin 5 milliGRAM(s) Oral at bedtime PRN Insomnia  ondansetron Injectable 4 milliGRAM(s) IV Push every 8 hours PRN Nausea and/or Vomiting  senna 2 Tablet(s) Oral at bedtime PRN Constipation      FAMILY HISTORY:  Family history of stroke (Mother)      SOCIAL HISTORY: negative for tobacco, alcohol, or illicit drug use.    Allergies  codeine (Hives; Urticaria)        GEN: NAD, pleasant, cooperative    NEURO:   MENTAL STATUS: AAOx2+ (aware month, year, day of week) Aware Scott is president   LANG/SPEECH: Fluent, intact naming, repetition & comprehension  CRANIAL NERVES:  II: Pupils equal round and reactive, no RAPD, normal visual fields  III, IV, VI: EOM intact, no gaze preference or deviation  V: normal  VII: no facial asymmetry  VIII: normal hearing to speech  MOTOR: 5-/5 in both upper and lower extremities  REFLEXES: absent at Achilles. Downgoing toes b/l   SENSORY: decreased sensation stocking distribution to light touch/vibration/temp   COORD: Mild dysmetria LUE. No dysmetria LEs         LABS:                        10.4   8.82  )-----------( 151      ( 24 Jan 2025 05:35 )             32.4     01-24    143  |  107  |  12  ----------------------------<  78  4.2   |  26  |  0.9    Ca    8.9      24 Jan 2025 05:35  Mg     2.5     01-24    TPro  6.8  /  Alb  4.1  /  TBili  0.3  /  DBili  x   /  AST  26  /  ALT  18  /  AlkPhos  72  01-23        CAPILLARY BLOOD GLUCOSE      POCT Blood Glucose.: 141 mg/dL (24 Jan 2025 08:02)      Urinalysis Basic - ( 24 Jan 2025 05:35 )    Color: x / Appearance: x / SG: x / pH: x  Gluc: 78 mg/dL / Ketone: x  / Bili: x / Urobili: x   Blood: x / Protein: x / Nitrite: x   Leuk Esterase: x / RBC: x / WBC x   Sq Epi: x / Non Sq Epi: x / Bacteria: x        Microbiology:    Urinalysis with Rflx Culture (collected 23 Jan 2025 20:42)        RADIOLOGY  < from: CT Head No Cont (01.23.25 @ 21:06) >  IMPRESSION:    No acute acute infarct, intracranial hemorrhage, or midline shift.    Slightly increased moderate ventriculomegaly with mild transependymal   edema since the prior CT head from 12/22/2024.    Stable mild-moderate chronic microvascular ischemic changes and small   scattered chronic infarcts.    --- End of Report ---    < end of copied text >             NEUROLOGY CONSULT    HPI: 89yo RHF PMH diabetes, HTN, stroke, CAD (on ASA/Plavix) presented to ED s/p mechanical fall. She had a previous fall in December when she tripped over a curb. She fell on her face, fracturing her nose and was hospitalized. Although she has always had episodes of lightheadedness/pre syncope, these increased after that fall. Most recent fall was mechanical as well when she slipped on ice throwing out her garbage. She fell on her knees, no head trauma. She has history of head pains on the right, worse to touch, for around the last year and even though she didn't hit her head, she feels these have been worse since the fall. She denies "spin" sensation. She notes she has had MRI/CT brain in the past as workup for R sided head pains. No recent change in medications. With exception of slightly worsened R sided head pains since most recent fall and being in the hospital, she denies any new neuro symptoms.     At baseline, she utilizes a walker. Gait has been worsening over the past year, more unsteady. No cognitive changes. No bladder/bowel incontinence.       MEDICATIONS  Home Medications:  atorvastatin 40 mg oral tablet: 1 orally once a day (at bedtime) (30 Nov 2023 15:30)  gabapentin 100 mg oral capsule: 1 cap(s) orally 3 times a day (30 Nov 2023 15:49)  glimepiride 4 mg oral tablet: 1 tab(s) orally 2 times a day (30 Nov 2023 15:29)  metFORMIN 1000 mg oral tablet: 1 tab(s) orally 2 times a day (30 Nov 2023 15:49)  polyethylene glycol 3350 oral powder for reconstitution: 17 gram(s) orally once a day (30 Nov 2023 15:30)  senna leaf extract oral tablet: 2 tab(s) orally once a day (at bedtime) (30 Nov 2023 15:30)    MEDICATIONS  (STANDING):  aspirin  chewable 81 milliGRAM(s) Oral daily  atorvastatin 40 milliGRAM(s) Oral at bedtime  cefTRIAXone   IVPB 1000 milliGRAM(s) IV Intermittent every 24 hours  chlorhexidine 2% Cloths 1 Application(s) Topical <User Schedule>  clopidogrel Tablet 75 milliGRAM(s) Oral daily  dextrose 5%. 1000 milliLiter(s) (50 mL/Hr) IV Continuous <Continuous>  dextrose 5%. 1000 milliLiter(s) (100 mL/Hr) IV Continuous <Continuous>  dextrose 50% Injectable 25 Gram(s) IV Push once  dextrose 50% Injectable 12.5 Gram(s) IV Push once  dextrose 50% Injectable 25 Gram(s) IV Push once  enoxaparin Injectable 40 milliGRAM(s) SubCutaneous every 24 hours  gabapentin 100 milliGRAM(s) Oral three times a day  glucagon  Injectable 1 milliGRAM(s) IntraMuscular once  insulin lispro (ADMELOG) corrective regimen sliding scale   SubCutaneous three times a day before meals  lisinopril 10 milliGRAM(s) Oral daily  NIFEdipine XL 30 milliGRAM(s) Oral two times a day  polyethylene glycol 3350 17 Gram(s) Oral daily  sodium chloride 0.9%. 1000 milliLiter(s) (75 mL/Hr) IV Continuous <Continuous>    MEDICATIONS  (PRN):  acetaminophen     Tablet .. 650 milliGRAM(s) Oral every 6 hours PRN Temp greater or equal to 38C (100.4F), Mild Pain (1 - 3)  aluminum hydroxide/magnesium hydroxide/simethicone Suspension 30 milliLiter(s) Oral every 4 hours PRN Dyspepsia  dextrose Oral Gel 15 Gram(s) Oral once PRN Blood Glucose LESS THAN 70 milliGRAM(s)/deciliter  meclizine 25 milliGRAM(s) Oral three times a day PRN for dizziness  melatonin 5 milliGRAM(s) Oral at bedtime PRN Insomnia  ondansetron Injectable 4 milliGRAM(s) IV Push every 8 hours PRN Nausea and/or Vomiting  senna 2 Tablet(s) Oral at bedtime PRN Constipation      FAMILY HISTORY:  Family history of stroke (Mother)      SOCIAL HISTORY: negative for tobacco, alcohol, or illicit drug use.    Allergies  codeine (Hives; Urticaria)        GEN: NAD, pleasant, cooperative    NEURO:   MENTAL STATUS: AAOx2+ (aware month, year, day of week) Aware Scott is president   LANG/SPEECH: Fluent, intact naming, repetition & comprehension  CRANIAL NERVES:  II: Pupils equal round and reactive, no RAPD, normal visual fields  III, IV, VI: EOM intact, no gaze preference or deviation  V: normal  VII: no facial asymmetry  VIII: normal hearing to speech  MOTOR: 5-/5 in both upper and lower extremities. No cogwheel rigidity   REFLEXES: absent at Achilles. Downgoing toes b/l   SENSORY: decreased sensation stocking distribution to light touch/vibration/temp   COORD: No dysmetria  GAIT: significantly unsteady. Difficulty turning  Proprioception intact   L pronator drift         LABS:                        10.4   8.82  )-----------( 151      ( 24 Jan 2025 05:35 )             32.4     01-24    143  |  107  |  12  ----------------------------<  78  4.2   |  26  |  0.9    Ca    8.9      24 Jan 2025 05:35  Mg     2.5     01-24    TPro  6.8  /  Alb  4.1  /  TBili  0.3  /  DBili  x   /  AST  26  /  ALT  18  /  AlkPhos  72  01-23        CAPILLARY BLOOD GLUCOSE      POCT Blood Glucose.: 141 mg/dL (24 Jan 2025 08:02)      Urinalysis Basic - ( 24 Jan 2025 05:35 )    Color: x / Appearance: x / SG: x / pH: x  Gluc: 78 mg/dL / Ketone: x  / Bili: x / Urobili: x   Blood: x / Protein: x / Nitrite: x   Leuk Esterase: x / RBC: x / WBC x   Sq Epi: x / Non Sq Epi: x / Bacteria: x        Microbiology:    Urinalysis with Rflx Culture (collected 23 Jan 2025 20:42)        RADIOLOGY  < from: CT Head No Cont (01.23.25 @ 21:06) >  IMPRESSION:    No acute acute infarct, intracranial hemorrhage, or midline shift.    Slightly increased moderate ventriculomegaly with mild transependymal   edema since the prior CT head from 12/22/2024.    Stable mild-moderate chronic microvascular ischemic changes and small   scattered chronic infarcts.    --- End of Report ---    < end of copied text >

## 2025-01-24 NOTE — CONSULT NOTE ADULT - ASSESSMENT
88yo RHF PMH diabetes, HTN, stroke, CAD (on ASA/Plavix) presented to ED s/p mechanical fall. Neuro consulted for dizziness/lightheadedness but patient notes these symptoms are not acute, and have been ongoing for years. Plus, she describes more lightheadedness, no "spin". On exam, questionable dysmetria LUE, possible due to old stroke. Also with reduced sensation stocking distribution; patient probably with diabetic neuropathy contributing to fall risk. Etiology of increased lightheaded episodes s/p fall in Dec possibly post concussive syndrome. Etiology of head pains, especially worsened since lying down in hospital, most likely neuralgia.   88yo RHF PMH diabetes, HTN, stroke, CAD (on ASA/Plavix) presented to ED s/p mechanical fall. Neuro consulted for dizziness/lightheadedness but patient notes these symptoms are not acute, and have been ongoing for years. Plus, she describes more lightheadedness, no "spin". On exam, questionable dysmetria LUE, possible due to old stroke. Also with reduced sensation stocking distribution; patient probably with diabetic neuropathy contributing to fall risk. CTH noted. With level of atrophy, less concern for NPH, most likely age related. Etiology of increased lightheaded episodes s/p fall in Dec possibly post concussive syndrome. Etiology of head pains, especially worsened since lying down in hospital, most likely neuralgia.     Recommendations  - Syncope workup  - Treat underlying infection  - F/u outpatient neurologist  - Medical management per primary team    Discussed with attending Dr Zendejas   89yo RHF PMH diabetes, HTN, stroke, CAD (on ASA/Plavix) presented to ED s/p mechanical fall. Neuro consulted for dizziness/lightheadedness but patient notes these symptoms are not acute, and have been ongoing for years. Plus, she describes more lightheadedness, no "spin". On exam, questionable dysmetria LUE, possible due to old stroke. Also with reduced sensation stocking distribution; patient probably with diabetic neuropathy contributing to fall risk. CTH noted. With level of atrophy, less concern for NPH, most likely age related. Etiology of increased lightheaded episodes s/p fall in Dec possibly post concussive syndrome. Etiology of head pains, especially worsened since lying down in hospital, most likely neuralgia.     Recommendations  - Syncope workup  - Treat underlying infection  - F/u outpatient neurologist  - Physical Therapy to evaluate gait/fall risk   - Medical management per primary team    Discussed with attending Dr Zendejas

## 2025-01-24 NOTE — CONSULT NOTE ADULT - ASSESSMENT
89-year-old female c/o dizziness and fell. Pt now c/o also of unable to ambulate. Pt with  past medical history of diabetes CVA hypertension CAD on aspirin Plavix presents here after mechanical fall.    ID is consulted for UTI   Afebrile  WBC 8.82  On  room air  UA WBC 35, + epi, UCx pending    CTH  No acute acute infarct, intracranial hemorrhage, or midline shift.  Slightly increased moderate ventriculomegaly with mild transependymal   edema since the prior CT head from 12/22/2024.  Stable mild-moderate chronic microvascular ischemic changes and small   scattered chronic infarcts.      IMPRESSION:  UTI  Mechanical fall  DM  Immunosuppression / Immunosenescence which could result in poor clinical outcome    RECOMMENDATIONS:  - IV ceftriaxone 1g q24hrs  - Follow up UCx  - Syncopal work up as per primary  - Offloading and frequent position changes, aspiration precaution  - Trend WBC, fever curve, transaminases, creatinine daily  - Please inform ID of any patient clinical change or any new pertinent laboratory or radiographic data    Aubrie Garcia D.O.  Attending Physician  Division of Infectious Diseases  [facility]  Please contact me via [employer] Teams

## 2025-01-24 NOTE — CONSULT NOTE ADULT - SUBJECTIVE AND OBJECTIVE BOX
INFECTIOUS DISEASE CONSULT NOTE    Patient is a 89y old  Female who presents with a chief complaint of fall / uti (23 Jan 2025 23:25)    HPI:   89-year-old female c/o dizziness and fell. Pt now c/o also of unable to ambulate. Pt with  past medical history of diabetes CVA hypertension CAD on aspirin Plavix presents here after mechanical fall.  Patient states it similar to her previous fall that she was here about 3 to 4 weeks ago where she was taking out the trash missed a curb and fell onto her left lower extremity.  Patient denies hitting her head ever patient states that she felt very lightheaded and dizzy after the fall and was unable to ambulate.  Patient denies any neck pain back pain abdominal  pain. (23 Jan 2025 23:25)         Prior hospital charts reviewed [Yes]  Primary team notes reviewed [Yes]  Other consultant notes reviewed [Yes]    REVIEW OF SYSTEMS:      PAST MEDICAL & SURGICAL HISTORY:  DM (diabetes mellitus)      HTN (hypertension)      Vertigo      CVA (cerebral vascular accident)      Breast cancer      H/O mastectomy  right          SOCIAL HISTORY:  - Born in _____, migrated to  in 19XX  - Currently working as / Retired  - Lives with _____; no pets  - No recent travel  - Denies tobacco use  - Denies alcohol use  - Denies illicit drug use  - Currently sexually active, has one male/female sexual partner    FAMILY HISTORY:  Family history of stroke (Mother)        Allergies:  codeine (Hives; Urticaria)      ANTIMICROBIALS:  cefTRIAXone   IVPB 1000 every 24 hours      ANTIMICROBIALS (past 90 days):  MEDICATIONS  (STANDING):    cefTRIAXone   IVPB   100 mL/Hr IV Intermittent (01-23-25 @ 22:42)        OTHER MEDS:   MEDICATIONS  (STANDING):  acetaminophen     Tablet .. 650 every 6 hours PRN  aluminum hydroxide/magnesium hydroxide/simethicone Suspension 30 every 4 hours PRN  aspirin  chewable 81 daily  atorvastatin 40 at bedtime  clopidogrel Tablet 75 daily  dextrose 50% Injectable 25 once  dextrose 50% Injectable 12.5 once  dextrose 50% Injectable 25 once  dextrose Oral Gel 15 once PRN  enoxaparin Injectable 40 every 24 hours  gabapentin 100 three times a day  glucagon  Injectable 1 once  insulin lispro (ADMELOG) corrective regimen sliding scale  three times a day before meals  lisinopril 10 daily  meclizine 25 three times a day PRN  melatonin 5 at bedtime PRN  NIFEdipine XL 30 two times a day  ondansetron Injectable 4 every 8 hours PRN  polyethylene glycol 3350 17 daily  senna 2 at bedtime PRN      VITALS:  Vital Signs Last 24 Hrs  T(F): 97.6 (01-24-25 @ 04:20), Max: 98.3 (01-23-25 @ 17:47)    Vital Signs Last 24 Hrs  HR: 58 (01-24-25 @ 04:20) (58 - 83)  BP: 113/52 (01-24-25 @ 04:20) (113/52 - 188/74)  RR: 18 (01-24-25 @ 04:20)  SpO2: 100% (01-24-25 @ 04:20) (95% - 100%)  Wt(kg): --    EXAM:    Labs:                        10.4   8.82  )-----------( 151      ( 24 Jan 2025 05:35 )             32.4     01-24    143  |  107  |  12  ----------------------------<  78  4.2   |  26  |  0.9    Ca    8.9      24 Jan 2025 05:35  Mg     2.5     01-24    TPro  6.8  /  Alb  4.1  /  TBili  0.3  /  DBili  x   /  AST  26  /  ALT  18  /  AlkPhos  72  01-23      WBC Trend:  WBC Count: 8.82 (01-24-25 @ 05:35)  WBC Count: 7.31 (01-23-25 @ 18:40)      Auto Neutrophil #: 6.01 K/uL (01-23-25 @ 18:40)  Auto Neutrophil #: 3.24 K/uL (12-22-24 @ 21:31)  Auto Neutrophil #: 7.35 K/uL (02-26-24 @ 22:14)      Creatine Trend:  Creatinine: 0.9 (01-24)  Creatinine: 1.0 (01-23)      Liver Biochemical Testing Trend:  Alanine Aminotransferase (ALT/SGPT): 18 (01-23)  Alanine Aminotransferase (ALT/SGPT): 25 (12-22)  Alanine Aminotransferase (ALT/SGPT): 12 (02-26)  Aspartate Aminotransferase (AST/SGOT): 26 (01-23-25 @ 18:40)  Aspartate Aminotransferase (AST/SGOT): 42 (12-22-24 @ 22:07)  Aspartate Aminotransferase (AST/SGOT): 18 (02-26-24 @ 18:50)  Bilirubin Total: 0.3 (01-23)  Bilirubin Total: 0.2 (12-22)  Bilirubin Total: 0.2 (02-26)      Trend LDH  01-24-25 @ 05:35  211      Auto Eosinophil %: 0.4 % (01-23-25 @ 18:40)      Urinalysis Basic - ( 24 Jan 2025 05:35 )    Color: x / Appearance: x / SG: x / pH: x  Gluc: 78 mg/dL / Ketone: x  / Bili: x / Urobili: x   Blood: x / Protein: x / Nitrite: x   Leuk Esterase: x / RBC: x / WBC x   Sq Epi: x / Non Sq Epi: x / Bacteria: x          MICROBIOLOGY:        Urinalysis with Rflx Culture (collected 23 Jan 2025 20:42)                                              Lactate Dehydrogenase, Serum: 211 (01-24)        Lactate, Blood: 2.2 (01-23 @ 22:12)  Blood Gas Venous - Lactate: 2.8 (01-23 @ 19:37)          RADIOLOGY:  imaging below personally reviewed   INFECTIOUS DISEASE CONSULT NOTE    Patient is a 89y old  Female who presents with a chief complaint of fall / uti (23 Jan 2025 23:25)    HPI:   89-year-old female c/o dizziness and fell. Pt now c/o also of unable to ambulate. Pt with  past medical history of diabetes CVA hypertension CAD on aspirin Plavix presents here after mechanical fall.  Patient states it similar to her previous fall that she was here about 3 to 4 weeks ago where she was taking out the trash missed a curb and fell onto her left lower extremity.  Patient denies hitting her head ever patient states that she felt very lightheaded and dizzy after the fall and was unable to ambulate.  Patient denies any neck pain back pain abdominal  pain. (23 Jan 2025 23:25)      Prior hospital charts reviewed [Yes]  Primary team notes reviewed [Yes]  Other consultant notes reviewed [Yes]    REVIEW OF SYSTEMS:  CONSTITUTIONAL: No fever or chills  HEAD: No lesion on scalp  EYES: No visual disturbance  ENT: No sore throat  RESPIRATORY: No cough, no shortness of breath  CARDIOVASCULAR: No chest pain or palpitations  GASTROINTESTINAL: No abdominal or epigastric pain  GENITOURINARY: + dysuria  NEUROLOGICAL: + dizziness  MUSCULOSKELETAL: No joint pain, erythema, or swelling; no back pain  SKIN: No itching, rashes  All other ROS negative except noted above      PAST MEDICAL & SURGICAL HISTORY:  DM (diabetes mellitus)      HTN (hypertension)      Vertigo      CVA (cerebral vascular accident)      Breast cancer      H/O mastectomy  right          SOCIAL HISTORY:  - No recent travel  - Denies tobacco use  - Denies alcohol use  - Denies illicit drug use    FAMILY HISTORY:  Family history of stroke (Mother)        Allergies:  codeine (Hives; Urticaria)      ANTIMICROBIALS:  cefTRIAXone   IVPB 1000 every 24 hours      ANTIMICROBIALS (past 90 days):  MEDICATIONS  (STANDING):    cefTRIAXone   IVPB   100 mL/Hr IV Intermittent (01-23-25 @ 22:42)        OTHER MEDS:   MEDICATIONS  (STANDING):  acetaminophen     Tablet .. 650 every 6 hours PRN  aluminum hydroxide/magnesium hydroxide/simethicone Suspension 30 every 4 hours PRN  aspirin  chewable 81 daily  atorvastatin 40 at bedtime  clopidogrel Tablet 75 daily  dextrose 50% Injectable 25 once  dextrose 50% Injectable 12.5 once  dextrose 50% Injectable 25 once  dextrose Oral Gel 15 once PRN  enoxaparin Injectable 40 every 24 hours  gabapentin 100 three times a day  glucagon  Injectable 1 once  insulin lispro (ADMELOG) corrective regimen sliding scale  three times a day before meals  lisinopril 10 daily  meclizine 25 three times a day PRN  melatonin 5 at bedtime PRN  NIFEdipine XL 30 two times a day  ondansetron Injectable 4 every 8 hours PRN  polyethylene glycol 3350 17 daily  senna 2 at bedtime PRN      VITALS:  Vital Signs Last 24 Hrs  T(F): 97.6 (01-24-25 @ 04:20), Max: 98.3 (01-23-25 @ 17:47)    Vital Signs Last 24 Hrs  HR: 58 (01-24-25 @ 04:20) (58 - 83)  BP: 113/52 (01-24-25 @ 04:20) (113/52 - 188/74)  RR: 18 (01-24-25 @ 04:20)  SpO2: 100% (01-24-25 @ 04:20) (95% - 100%)  Wt(kg): --    EXAM:  GENERAL: NAD, lying in bed  HEAD: No head lesions  EYES: Conjunctiva pink and cornea white  EAR, NOSE, MOUTH, THROAT: Normal external ears and nose, no discharges; moist mucous membranes  NECK: Supple, nontender to palpation; no JVD  RESPIRATORY: Clear to auscultation bilaterally  CARDIOVASCULAR: S1 S2  GASTROINTESTINAL: Soft, nontender, nondistended; normoactive bowel sounds  GENITOURINARY: No jordan catheter, no CVA tenderness  EXTREMITIES: No clubbing, cyanosis, or petal edema  NERVOUS SYSTEM: Alert and oriented to person, time, place and situation, speech clear. No focal deficits   MUSCULOSKELETAL: No joint erythema, swelling or pain  SKIN: No rashes or lesions, no superficial thrombophlebitis  PSYCH: Normal affect      Labs:                        10.4   8.82  )-----------( 151      ( 24 Jan 2025 05:35 )             32.4     01-24    143  |  107  |  12  ----------------------------<  78  4.2   |  26  |  0.9    Ca    8.9      24 Jan 2025 05:35  Mg     2.5     01-24    TPro  6.8  /  Alb  4.1  /  TBili  0.3  /  DBili  x   /  AST  26  /  ALT  18  /  AlkPhos  72  01-23      WBC Trend:  WBC Count: 8.82 (01-24-25 @ 05:35)  WBC Count: 7.31 (01-23-25 @ 18:40)      Auto Neutrophil #: 6.01 K/uL (01-23-25 @ 18:40)  Auto Neutrophil #: 3.24 K/uL (12-22-24 @ 21:31)  Auto Neutrophil #: 7.35 K/uL (02-26-24 @ 22:14)      Creatine Trend:  Creatinine: 0.9 (01-24)  Creatinine: 1.0 (01-23)      Liver Biochemical Testing Trend:  Alanine Aminotransferase (ALT/SGPT): 18 (01-23)  Alanine Aminotransferase (ALT/SGPT): 25 (12-22)  Alanine Aminotransferase (ALT/SGPT): 12 (02-26)  Aspartate Aminotransferase (AST/SGOT): 26 (01-23-25 @ 18:40)  Aspartate Aminotransferase (AST/SGOT): 42 (12-22-24 @ 22:07)  Aspartate Aminotransferase (AST/SGOT): 18 (02-26-24 @ 18:50)  Bilirubin Total: 0.3 (01-23)  Bilirubin Total: 0.2 (12-22)  Bilirubin Total: 0.2 (02-26)      Trend LDH  01-24-25 @ 05:35  211      Auto Eosinophil %: 0.4 % (01-23-25 @ 18:40)      Urinalysis Basic - ( 24 Jan 2025 05:35 )    Color: x / Appearance: x / SG: x / pH: x  Gluc: 78 mg/dL / Ketone: x  / Bili: x / Urobili: x   Blood: x / Protein: x / Nitrite: x   Leuk Esterase: x / RBC: x / WBC x   Sq Epi: x / Non Sq Epi: x / Bacteria: x          MICROBIOLOGY:        Urinalysis with Rflx Culture (collected 23 Jan 2025 20:42)      Lactate Dehydrogenase, Serum: 211 (01-24)        Lactate, Blood: 2.2 (01-23 @ 22:12)  Blood Gas Venous - Lactate: 2.8 (01-23 @ 19:37)      RADIOLOGY:  imaging below personally reviewed      < from: CT Head No Cont (01.23.25 @ 21:06) >  IMPRESSION:    No acute acute infarct, intracranial hemorrhage, or midline shift.    Slightly increased moderate ventriculomegaly with mild transependymal   edema since the prior CT head from 12/22/2024.    Stable mild-moderate chronic microvascular ischemic changes and small   scattered chronic infarcts.    < end of copied text >    < from: Xray Tibia + Fibula 2 Views, Bilateral (01.23.25 @ 19:44) >  Findings/  impression: Noacute fracture or dislocation. Osteopenia. Bilateral knee   degenerative changes and chondrocalcinosis.. Bilateral soft tissue   vascular calcification    < end of copied text >

## 2025-01-24 NOTE — CHART NOTE - NSCHARTNOTEFT_GEN_A_CORE
pt c/o right facial sutures that need to be removed  Plan :  consider surgery to removed sutures at bed side

## 2025-01-24 NOTE — CONSULT NOTE ADULT - TIME BILLING
I have personally seen and examined this patient.    I have reviewed all pertinent clinical information and reviewed all relevant imaging and diagnostic studies personally.   I discussed recommendations with the primary team.
Review of imaging and chart; obtaining history; examination of patient; discussion and coordination of care.
no

## 2025-01-24 NOTE — CONSULT NOTE ADULT - SUBJECTIVE AND OBJECTIVE BOX
Patient is a  89-year-old female c/o dizziness and fell. Pt now c/o also of unable to ambulate. Pt with  past medical history of diabetes CVA hypertension CAD on aspirin Plavix presents here after mechanical fall.  Patient states it similar to her previous fall that she was here about 3 to 4 weeks ago where she was taking out the trash missed a curb and fell onto her left lower extremity.  Patient denies hitting her head ever patient states that she felt very lightheaded and dizzy after the fall and was unable to ambulate.  Patient denies any neck pain back pain abdominal  pain.     PAST MEDICAL & SURGICAL HISTORY:  DM (diabetes mellitus)  HTN (hypertension)  Vertigo  CVA (cerebral vascular accident)  Breast cancer  H/O mastectomy  right    REVIEW OF SYSTEMS:   All other systems negative    MEDICATIONS  (STANDING):  aspirin  chewable 81 milliGRAM(s) Oral daily  atorvastatin 40 milliGRAM(s) Oral at bedtime  cefTRIAXone   IVPB 1000 milliGRAM(s) IV Intermittent every 24 hours  chlorhexidine 2% Cloths 1 Application(s) Topical <User Schedule>  clopidogrel Tablet 75 milliGRAM(s) Oral daily  dextrose 5%. 1000 milliLiter(s) (50 mL/Hr) IV Continuous <Continuous>  dextrose 5%. 1000 milliLiter(s) (100 mL/Hr) IV Continuous <Continuous>  dextrose 50% Injectable 25 Gram(s) IV Push once  dextrose 50% Injectable 12.5 Gram(s) IV Push once  dextrose 50% Injectable 25 Gram(s) IV Push once  enoxaparin Injectable 40 milliGRAM(s) SubCutaneous every 24 hours  gabapentin 100 milliGRAM(s) Oral three times a day  glucagon  Injectable 1 milliGRAM(s) IntraMuscular once  insulin lispro (ADMELOG) corrective regimen sliding scale   SubCutaneous three times a day before meals  lisinopril 10 milliGRAM(s) Oral daily  NIFEdipine XL 30 milliGRAM(s) Oral two times a day  polyethylene glycol 3350 17 Gram(s) Oral daily  sodium chloride 0.9%. 1000 milliLiter(s) (75 mL/Hr) IV Continuous <Continuous>    MEDICATIONS  (PRN):  acetaminophen     Tablet .. 650 milliGRAM(s) Oral every 6 hours PRN Temp greater or equal to 38C (100.4F), Mild Pain (1 - 3)  aluminum hydroxide/magnesium hydroxide/simethicone Suspension 30 milliLiter(s) Oral every 4 hours PRN Dyspepsia  dextrose Oral Gel 15 Gram(s) Oral once PRN Blood Glucose LESS THAN 70 milliGRAM(s)/deciliter  meclizine 25 milliGRAM(s) Oral three times a day PRN for dizziness  melatonin 5 milliGRAM(s) Oral at bedtime PRN Insomnia  ondansetron Injectable 4 milliGRAM(s) IV Push every 8 hours PRN Nausea and/or Vomiting  senna 2 Tablet(s) Oral at bedtime PRN Constipation      Allergies  codeine (Hives; Urticaria)  Intolerances        SOCIAL HISTORY:  From Home     FAMILY HISTORY:  Family history of stroke (Mother)       PHYSICAL EXAM:  Vital Signs Last 24 Hrs  T(C): 36.4 (24 Jan 2025 04:20), Max: 36.8 (23 Jan 2025 17:47)  T(F): 97.6 (24 Jan 2025 04:20), Max: 98.3 (23 Jan 2025 17:47)  HR: 58 (24 Jan 2025 04:20) (58 - 83)  BP: 113/52 (24 Jan 2025 04:20) (113/52 - 188/74)  BP(mean): --  RR: 18 (24 Jan 2025 04:20) (18 - 18)  SpO2: 100% (24 Jan 2025 04:20) (95% - 100%)    Parameters below as of 24 Jan 2025 04:20  Patient On (Oxygen Delivery Method): room air      General : NAD    HEENT:  NC/AT, PERRL, EOMI, sclera clear, mucosa moist, throat clear, trachea midline, neck supple  Cardiovascular: RRR   Respiratory: CTA  Gastrointestinal :  Soft NT/ND (+)BS    Neurology:  Weakened strength & sensation   Psych: Calm  Musculoskeletal:  FROM,  Skin:  B/l lower extremity ulceration   No odor, erythema, increased warmth, tenderness, induration, fluctuance, nor crepitus    LABS/ CULTURES/ RADIOLOGY:                        10.4   8.82  )-----------( 151      ( 24 Jan 2025 05:35 )             32.4       143  |  107  |  12  ----------------------------<  78      [01-24-25 @ 05:35]  4.2   |  26  |  0.9        Ca     8.9     [01-24-25 @ 05:35]      Mg     2.5     [01-24-25 @ 05:35]    TPro  6.8  /  Alb  4.1  /  TBili  0.3  /  DBili  x   /  AST  26  /  ALT  18  /  AlkPhos  72  [01-23-25 @ 18:40]              [01-24-25 @ 05:35]

## 2025-01-24 NOTE — CHART NOTE - NSCHARTNOTEFT_GEN_A_CORE
Vital Signs Last 24 Hrs  T(C): 36.6 (24 Jan 2025 01:05), Max: 36.8 (23 Jan 2025 17:47)  T(F): 97.9 (24 Jan 2025 01:05), Max: 98.3 (23 Jan 2025 17:47)  HR: 74 (24 Jan 2025 01:05) (68 - 83)  BP: 114/56 (24 Jan 2025 01:05) (114/56 - 188/74)  BP(mean): --  RR: 18 (24 Jan 2025 01:05) (18 - 18)  SpO2: 97% (24 Jan 2025 01:05) (95% - 97%)  Parameters below as of 24 Jan 2025 01:05  Patient On (Oxygen Delivery Method): room air.  Pt c/o dizziness.  Reviewed CT.    Neuro: CN II thru XII intact.  strength is symmetrical bilaterally, 4/5.  FROMI bilaterally.  Speech is clear and articulate. No appreciative neurology deficits.    Will give Meclizine.  Pt is also being treated for UTI. Vital Signs Last 24 Hrs  T(C): 36.6 (24 Jan 2025 01:05), Max: 36.8 (23 Jan 2025 17:47)  T(F): 97.9 (24 Jan 2025 01:05), Max: 98.3 (23 Jan 2025 17:47)  HR: 74 (24 Jan 2025 01:05) (68 - 83)  BP: 114/56 (24 Jan 2025 01:05) (114/56 - 188/74)  BP(mean): --  RR: 18 (24 Jan 2025 01:05) (18 - 18)  SpO2: 97% (24 Jan 2025 01:05) (95% - 97%)  Parameters below as of 24 Jan 2025 01:05  Patient On (Oxygen Delivery Method): room air.  Pt c/o dizziness.  Reviewed CT.    Neuro: CN II thru XII intact.  strength is symmetrical bilaterally, 4/5.  FROMI bilaterally.  Speech is clear and articulate. No appreciative neurology deficits.    Will give Meclizine.  Neuro checks q8. F/U neuro consult. Pt is also being treated for UTI.

## 2025-01-25 LAB
ALBUMIN SERPL ELPH-MCNC: 3.5 G/DL — SIGNIFICANT CHANGE UP (ref 3.5–5.2)
ALP SERPL-CCNC: 70 U/L — SIGNIFICANT CHANGE UP (ref 30–115)
ALT FLD-CCNC: 13 U/L — SIGNIFICANT CHANGE UP (ref 0–41)
ANION GAP SERPL CALC-SCNC: 14 MMOL/L — SIGNIFICANT CHANGE UP (ref 7–14)
AST SERPL-CCNC: 19 U/L — SIGNIFICANT CHANGE UP (ref 0–41)
BASOPHILS # BLD AUTO: 0.03 K/UL — SIGNIFICANT CHANGE UP (ref 0–0.2)
BASOPHILS NFR BLD AUTO: 0.4 % — SIGNIFICANT CHANGE UP (ref 0–1)
BILIRUB SERPL-MCNC: 0.2 MG/DL — SIGNIFICANT CHANGE UP (ref 0.2–1.2)
BUN SERPL-MCNC: 11 MG/DL — SIGNIFICANT CHANGE UP (ref 10–20)
CALCIUM SERPL-MCNC: 8.7 MG/DL — SIGNIFICANT CHANGE UP (ref 8.4–10.5)
CHLORIDE SERPL-SCNC: 107 MMOL/L — SIGNIFICANT CHANGE UP (ref 98–110)
CO2 SERPL-SCNC: 19 MMOL/L — SIGNIFICANT CHANGE UP (ref 17–32)
CREAT SERPL-MCNC: 0.9 MG/DL — SIGNIFICANT CHANGE UP (ref 0.7–1.5)
EGFR: 61 ML/MIN/1.73M2 — SIGNIFICANT CHANGE UP
EOSINOPHIL # BLD AUTO: 0.06 K/UL — SIGNIFICANT CHANGE UP (ref 0–0.7)
EOSINOPHIL NFR BLD AUTO: 0.8 % — SIGNIFICANT CHANGE UP (ref 0–8)
GLUCOSE BLDC GLUCOMTR-MCNC: 205 MG/DL — HIGH (ref 70–99)
GLUCOSE BLDC GLUCOMTR-MCNC: 234 MG/DL — HIGH (ref 70–99)
GLUCOSE BLDC GLUCOMTR-MCNC: 253 MG/DL — HIGH (ref 70–99)
GLUCOSE BLDC GLUCOMTR-MCNC: 271 MG/DL — HIGH (ref 70–99)
GLUCOSE SERPL-MCNC: 211 MG/DL — HIGH (ref 70–99)
HCT VFR BLD CALC: 33.9 % — LOW (ref 37–47)
HGB BLD-MCNC: 10.6 G/DL — LOW (ref 12–16)
IMM GRANULOCYTES NFR BLD AUTO: 0.1 % — SIGNIFICANT CHANGE UP (ref 0.1–0.3)
LACTATE SERPL-SCNC: 1.7 MMOL/L — SIGNIFICANT CHANGE UP (ref 0.7–2)
LYMPHOCYTES # BLD AUTO: 1.58 K/UL — SIGNIFICANT CHANGE UP (ref 1.2–3.4)
LYMPHOCYTES # BLD AUTO: 22.2 % — SIGNIFICANT CHANGE UP (ref 20.5–51.1)
MAGNESIUM SERPL-MCNC: 1.8 MG/DL — SIGNIFICANT CHANGE UP (ref 1.8–2.4)
MCHC RBC-ENTMCNC: 26.9 PG — LOW (ref 27–31)
MCHC RBC-ENTMCNC: 31.3 G/DL — LOW (ref 32–37)
MCV RBC AUTO: 86 FL — SIGNIFICANT CHANGE UP (ref 81–99)
MONOCYTES # BLD AUTO: 0.33 K/UL — SIGNIFICANT CHANGE UP (ref 0.1–0.6)
MONOCYTES NFR BLD AUTO: 4.6 % — SIGNIFICANT CHANGE UP (ref 1.7–9.3)
NEUTROPHILS # BLD AUTO: 5.11 K/UL — SIGNIFICANT CHANGE UP (ref 1.4–6.5)
NEUTROPHILS NFR BLD AUTO: 71.9 % — SIGNIFICANT CHANGE UP (ref 42.2–75.2)
NRBC # BLD: 0 /100 WBCS — SIGNIFICANT CHANGE UP (ref 0–0)
NRBC BLD-RTO: 0 /100 WBCS — SIGNIFICANT CHANGE UP (ref 0–0)
PLATELET # BLD AUTO: 164 K/UL — SIGNIFICANT CHANGE UP (ref 130–400)
PMV BLD: 9 FL — SIGNIFICANT CHANGE UP (ref 7.4–10.4)
POTASSIUM SERPL-MCNC: 4.2 MMOL/L — SIGNIFICANT CHANGE UP (ref 3.5–5)
POTASSIUM SERPL-SCNC: 4.2 MMOL/L — SIGNIFICANT CHANGE UP (ref 3.5–5)
PROT SERPL-MCNC: 6.2 G/DL — SIGNIFICANT CHANGE UP (ref 6–8)
RBC # BLD: 3.94 M/UL — LOW (ref 4.2–5.4)
RBC # FLD: 13.3 % — SIGNIFICANT CHANGE UP (ref 11.5–14.5)
SODIUM SERPL-SCNC: 140 MMOL/L — SIGNIFICANT CHANGE UP (ref 135–146)
WBC # BLD: 7.12 K/UL — SIGNIFICANT CHANGE UP (ref 4.8–10.8)
WBC # FLD AUTO: 7.12 K/UL — SIGNIFICANT CHANGE UP (ref 4.8–10.8)

## 2025-01-25 PROCEDURE — 99232 SBSQ HOSP IP/OBS MODERATE 35: CPT

## 2025-01-25 RX ADMIN — ATORVASTATIN CALCIUM 40 MILLIGRAM(S): 80 TABLET, FILM COATED ORAL at 21:55

## 2025-01-25 RX ADMIN — GABAPENTIN 100 MILLIGRAM(S): 800 TABLET ORAL at 05:13

## 2025-01-25 RX ADMIN — Medication 4: at 08:30

## 2025-01-25 RX ADMIN — ANTISEPTIC SURGICAL SCRUB 1 APPLICATION(S): 0.04 SOLUTION TOPICAL at 05:10

## 2025-01-25 RX ADMIN — ACETAMINOPHEN, DIPHENHYDRAMINE HCL, PHENYLEPHRINE HCL 5 MILLIGRAM(S): 325; 25; 5 TABLET ORAL at 23:06

## 2025-01-25 RX ADMIN — GABAPENTIN 100 MILLIGRAM(S): 800 TABLET ORAL at 21:55

## 2025-01-25 RX ADMIN — POLYETHYLENE GLYCOL 3350 17 GRAM(S): 17 POWDER, FOR SOLUTION ORAL at 12:04

## 2025-01-25 RX ADMIN — Medication 75 MILLIGRAM(S): at 12:04

## 2025-01-25 RX ADMIN — NIFEDIPINE 30 MILLIGRAM(S): 90 TABLET, FILM COATED, EXTENDED RELEASE ORAL at 18:58

## 2025-01-25 RX ADMIN — ASPIRIN 81 MILLIGRAM(S): 81 TABLET, COATED ORAL at 12:04

## 2025-01-25 RX ADMIN — Medication 6: at 12:03

## 2025-01-25 RX ADMIN — GABAPENTIN 100 MILLIGRAM(S): 800 TABLET ORAL at 16:09

## 2025-01-25 RX ADMIN — ENOXAPARIN SODIUM 40 MILLIGRAM(S): 100 INJECTION SUBCUTANEOUS at 16:09

## 2025-01-25 NOTE — PHYSICAL THERAPY INITIAL EVALUATION ADULT - GENERAL OBSERVATIONS, REHAB EVAL
[id number] pt was seen for PT IE at bed side, pt is agreeable, chart thoroughly reviewed, RN Candelaria is aware.  not performed, pt refused; semi delaney in bed, in no apparent distress, +hep lock, +primafit, +call bell within reach, bed side table at reach

## 2025-01-25 NOTE — PHYSICAL THERAPY INITIAL EVALUATION ADULT - ADDITIONAL COMMENTS
pt lives alone in a private house with 1 step to enter and no stairs inside.  Pt was able to amb with RW prior to admission.  Pt has "meals oh wheels service"

## 2025-01-26 LAB
-  AMOXICILLIN/CLAVULANIC ACID: SIGNIFICANT CHANGE UP
-  AMPICILLIN/SULBACTAM: SIGNIFICANT CHANGE UP
-  AMPICILLIN: SIGNIFICANT CHANGE UP
-  AZTREONAM: SIGNIFICANT CHANGE UP
-  CEFAZOLIN: SIGNIFICANT CHANGE UP
-  CEFEPIME: SIGNIFICANT CHANGE UP
-  CEFOXITIN: SIGNIFICANT CHANGE UP
-  CEFTRIAXONE: SIGNIFICANT CHANGE UP
-  CEFUROXIME: SIGNIFICANT CHANGE UP
-  CIPROFLOXACIN: SIGNIFICANT CHANGE UP
-  ERTAPENEM: SIGNIFICANT CHANGE UP
-  GENTAMICIN: SIGNIFICANT CHANGE UP
-  IMIPENEM: SIGNIFICANT CHANGE UP
-  LEVOFLOXACIN: SIGNIFICANT CHANGE UP
-  MEROPENEM: SIGNIFICANT CHANGE UP
-  NITROFURANTOIN: SIGNIFICANT CHANGE UP
-  PIPERACILLIN/TAZOBACTAM: SIGNIFICANT CHANGE UP
-  TOBRAMYCIN: SIGNIFICANT CHANGE UP
-  TRIMETHOPRIM/SULFAMETHOXAZOLE: SIGNIFICANT CHANGE UP
CULTURE RESULTS: ABNORMAL
GLUCOSE BLDC GLUCOMTR-MCNC: 187 MG/DL — HIGH (ref 70–99)
GLUCOSE BLDC GLUCOMTR-MCNC: 236 MG/DL — HIGH (ref 70–99)
GLUCOSE BLDC GLUCOMTR-MCNC: 255 MG/DL — HIGH (ref 70–99)
GLUCOSE BLDC GLUCOMTR-MCNC: 322 MG/DL — HIGH (ref 70–99)
METHOD TYPE: SIGNIFICANT CHANGE UP
ORGANISM # SPEC MICROSCOPIC CNT: ABNORMAL
ORGANISM # SPEC MICROSCOPIC CNT: SIGNIFICANT CHANGE UP
SPECIMEN SOURCE: SIGNIFICANT CHANGE UP

## 2025-01-26 PROCEDURE — 99232 SBSQ HOSP IP/OBS MODERATE 35: CPT

## 2025-01-26 RX ADMIN — NIFEDIPINE 30 MILLIGRAM(S): 90 TABLET, FILM COATED, EXTENDED RELEASE ORAL at 17:07

## 2025-01-26 RX ADMIN — GABAPENTIN 100 MILLIGRAM(S): 800 TABLET ORAL at 21:14

## 2025-01-26 RX ADMIN — ACETAMINOPHEN, DIPHENHYDRAMINE HCL, PHENYLEPHRINE HCL 5 MILLIGRAM(S): 325; 25; 5 TABLET ORAL at 21:15

## 2025-01-26 RX ADMIN — CEFTRIAXONE 100 MILLIGRAM(S): 250 INJECTION, POWDER, FOR SOLUTION INTRAMUSCULAR; INTRAVENOUS at 00:21

## 2025-01-26 RX ADMIN — Medication 4: at 08:17

## 2025-01-26 RX ADMIN — ANTISEPTIC SURGICAL SCRUB 1 APPLICATION(S): 0.04 SOLUTION TOPICAL at 05:10

## 2025-01-26 RX ADMIN — NIFEDIPINE 30 MILLIGRAM(S): 90 TABLET, FILM COATED, EXTENDED RELEASE ORAL at 05:10

## 2025-01-26 RX ADMIN — Medication 8: at 12:12

## 2025-01-26 RX ADMIN — Medication 25 MILLIGRAM(S): at 18:39

## 2025-01-26 RX ADMIN — Medication 75 MILLIGRAM(S): at 12:12

## 2025-01-26 RX ADMIN — ENOXAPARIN SODIUM 40 MILLIGRAM(S): 100 INJECTION SUBCUTANEOUS at 17:00

## 2025-01-26 RX ADMIN — GABAPENTIN 100 MILLIGRAM(S): 800 TABLET ORAL at 05:10

## 2025-01-26 RX ADMIN — GABAPENTIN 100 MILLIGRAM(S): 800 TABLET ORAL at 17:07

## 2025-01-26 RX ADMIN — ASPIRIN 81 MILLIGRAM(S): 81 TABLET, COATED ORAL at 12:11

## 2025-01-26 RX ADMIN — Medication 10 MILLIGRAM(S): at 05:10

## 2025-01-26 RX ADMIN — CEFTRIAXONE 100 MILLIGRAM(S): 250 INJECTION, POWDER, FOR SOLUTION INTRAMUSCULAR; INTRAVENOUS at 22:14

## 2025-01-26 RX ADMIN — ATORVASTATIN CALCIUM 40 MILLIGRAM(S): 80 TABLET, FILM COATED ORAL at 21:14

## 2025-01-26 RX ADMIN — POLYETHYLENE GLYCOL 3350 17 GRAM(S): 17 POWDER, FOR SOLUTION ORAL at 08:19

## 2025-01-26 RX ADMIN — Medication 2: at 16:58

## 2025-01-27 LAB
GLUCOSE BLDC GLUCOMTR-MCNC: 215 MG/DL — HIGH (ref 70–99)
GLUCOSE BLDC GLUCOMTR-MCNC: 227 MG/DL — HIGH (ref 70–99)
GLUCOSE BLDC GLUCOMTR-MCNC: 279 MG/DL — HIGH (ref 70–99)
GLUCOSE BLDC GLUCOMTR-MCNC: 299 MG/DL — HIGH (ref 70–99)

## 2025-01-27 PROCEDURE — 99232 SBSQ HOSP IP/OBS MODERATE 35: CPT

## 2025-01-27 RX ADMIN — ENOXAPARIN SODIUM 40 MILLIGRAM(S): 100 INJECTION SUBCUTANEOUS at 17:13

## 2025-01-27 RX ADMIN — ASPIRIN 81 MILLIGRAM(S): 81 TABLET, COATED ORAL at 11:30

## 2025-01-27 RX ADMIN — POLYETHYLENE GLYCOL 3350 17 GRAM(S): 17 POWDER, FOR SOLUTION ORAL at 11:28

## 2025-01-27 RX ADMIN — ACETAMINOPHEN, DIPHENHYDRAMINE HCL, PHENYLEPHRINE HCL 5 MILLIGRAM(S): 325; 25; 5 TABLET ORAL at 22:24

## 2025-01-27 RX ADMIN — Medication 75 MILLIGRAM(S): at 11:28

## 2025-01-27 RX ADMIN — ATORVASTATIN CALCIUM 40 MILLIGRAM(S): 80 TABLET, FILM COATED ORAL at 22:24

## 2025-01-27 RX ADMIN — GABAPENTIN 100 MILLIGRAM(S): 800 TABLET ORAL at 05:21

## 2025-01-27 RX ADMIN — ANTISEPTIC SURGICAL SCRUB 1 APPLICATION(S): 0.04 SOLUTION TOPICAL at 05:21

## 2025-01-27 RX ADMIN — GABAPENTIN 100 MILLIGRAM(S): 800 TABLET ORAL at 13:27

## 2025-01-27 RX ADMIN — Medication 4: at 11:27

## 2025-01-27 RX ADMIN — Medication 4: at 08:04

## 2025-01-27 RX ADMIN — GABAPENTIN 100 MILLIGRAM(S): 800 TABLET ORAL at 22:24

## 2025-01-27 RX ADMIN — Medication 6: at 17:12

## 2025-01-27 NOTE — CHART NOTE - NSCHARTNOTEFT_GEN_A_CORE
Vital Signs Last 24 Hrs  T(C): 37.1 (27 Jan 2025 05:20), Max: 37.1 (27 Jan 2025 05:20)  T(F): 98.7 (27 Jan 2025 05:20), Max: 98.7 (27 Jan 2025 05:20)  HR: 62 (27 Jan 2025 05:20) (62 - 77)  BP: 103/60 (27 Jan 2025 05:20) (100/64 - 123/57)  BP(mean): --  RR: 18 (27 Jan 2025 05:20) (18 - 18)  SpO2: 95% (27 Jan 2025 05:20) (95% - 99%)  Parameters below as of 26 Jan 2025 21:00  Patient On (Oxygen Delivery Method): room air  Will hold this mornings Procardia and Lisinopril.  Reassess VS later this morning.

## 2025-01-27 NOTE — PROGRESS NOTE ADULT - SUBJECTIVE AND OBJECTIVE BOX
89-year-old female c/o dizziness and fell. Pt now c/o also of unable to ambulate. Pt with  past medical history of diabetes CVA hypertension CAD on aspirin Plavix presents here after mechanical fall.     Today:  Seen at bedside, no overnight events.            REVIEW OF SYSTEMS:  No new complaints      MEDICATIONS  (STANDING):  aspirin  chewable 81 milliGRAM(s) Oral daily  atorvastatin 40 milliGRAM(s) Oral at bedtime  cefTRIAXone   IVPB 1000 milliGRAM(s) IV Intermittent every 24 hours  chlorhexidine 2% Cloths 1 Application(s) Topical <User Schedule>  clopidogrel Tablet 75 milliGRAM(s) Oral daily  dextrose 5%. 1000 milliLiter(s) (50 mL/Hr) IV Continuous <Continuous>  dextrose 5%. 1000 milliLiter(s) (100 mL/Hr) IV Continuous <Continuous>  dextrose 50% Injectable 25 Gram(s) IV Push once  dextrose 50% Injectable 12.5 Gram(s) IV Push once  dextrose 50% Injectable 25 Gram(s) IV Push once  enoxaparin Injectable 40 milliGRAM(s) SubCutaneous every 24 hours  gabapentin 100 milliGRAM(s) Oral three times a day  glucagon  Injectable 1 milliGRAM(s) IntraMuscular once  insulin lispro (ADMELOG) corrective regimen sliding scale   SubCutaneous three times a day before meals  lisinopril 10 milliGRAM(s) Oral daily  NIFEdipine XL 30 milliGRAM(s) Oral two times a day  polyethylene glycol 3350 17 Gram(s) Oral daily  sodium chloride 0.9%. 1000 milliLiter(s) (75 mL/Hr) IV Continuous <Continuous>    MEDICATIONS  (PRN):  acetaminophen     Tablet .. 650 milliGRAM(s) Oral every 6 hours PRN Temp greater or equal to 38C (100.4F), Mild Pain (1 - 3)  aluminum hydroxide/magnesium hydroxide/simethicone Suspension 30 milliLiter(s) Oral every 4 hours PRN Dyspepsia  dextrose Oral Gel 15 Gram(s) Oral once PRN Blood Glucose LESS THAN 70 milliGRAM(s)/deciliter  meclizine 25 milliGRAM(s) Oral three times a day PRN for dizziness  melatonin 5 milliGRAM(s) Oral at bedtime PRN Insomnia  ondansetron Injectable 4 milliGRAM(s) IV Push every 8 hours PRN Nausea and/or Vomiting  senna 2 Tablet(s) Oral at bedtime PRN Constipation      Allergies  codeine (Hives; Urticaria)        FAMILY HISTORY:  Family history of stroke (Mother)        Vital Signs Last 24 Hrs  T(C): 37.1 (25 Jan 2025 14:33), Max: 37.1 (24 Jan 2025 21:00)  T(F): 98.8 (25 Jan 2025 14:33), Max: 98.8 (25 Jan 2025 14:33)  HR: 94 (25 Jan 2025 14:33) (60 - 94)  BP: 139/66 (25 Jan 2025 14:33) (93/51 - 139/66)  RR: 18 (25 Jan 2025 04:25) (18 - 18)  SpO2: 97% (25 Jan 2025 04:25) (95% - 97%)    Parameters below as of 25 Jan 2025 04:25  Patient On (Oxygen Delivery Method): room air        PHYSICAL EXAM:  GENERAL: NAD, lying in bed comfortably  CHEST/LUNG: Clear to auscultation bilaterally; No rales, rhonchi, wheezing, or rubs. Unlabored respirations  HEART: Regular rate and rhythm; No murmurs, rubs, or gallops  ABDOMEN: Soft, nontender, nondistended  EXTREMITIES:  No clubbing, cyanosis, or edema      LABS:                        10.6   7.12  )-----------( 164      ( 25 Jan 2025 07:46 )             33.9     01-25    140  |  107  |  11  ----------------------------<  211[H]  4.2   |  19  |  0.9    Ca    8.7      25 Jan 2025 07:46  Mg     1.8     01-25    TPro  6.2  /  Alb  3.5  /  TBili  0.2  /  DBili  x   /  AST  19  /  ALT  13  /  AlkPhos  70  01-25      Urinalysis Basic - ( 25 Jan 2025 07:46 )    Color: x / Appearance: x / SG: x / pH: x  Gluc: 211 mg/dL / Ketone: x  / Bili: x / Urobili: x   Blood: x / Protein: x / Nitrite: x   Leuk Esterase: x / RBC: x / WBC x   Sq Epi: x / Non Sq Epi: x / Bacteria: x      
89-year-old female c/o dizziness and fell. Pt now c/o also of unable to ambulate. Pt with  past medical history of diabetes CVA hypertension CAD on aspirin Plavix presents here after mechanical fall.     Today:  Seen at bedside, no overnight events.        REVIEW OF SYSTEMS:  No complaints      MEDICATIONS  (STANDING):  aspirin  chewable 81 milliGRAM(s) Oral daily  atorvastatin 40 milliGRAM(s) Oral at bedtime  chlorhexidine 2% Cloths 1 Application(s) Topical <User Schedule>  clopidogrel Tablet 75 milliGRAM(s) Oral daily  dextrose 5%. 1000 milliLiter(s) (50 mL/Hr) IV Continuous <Continuous>  dextrose 5%. 1000 milliLiter(s) (100 mL/Hr) IV Continuous <Continuous>  dextrose 50% Injectable 25 Gram(s) IV Push once  dextrose 50% Injectable 12.5 Gram(s) IV Push once  dextrose 50% Injectable 25 Gram(s) IV Push once  enoxaparin Injectable 40 milliGRAM(s) SubCutaneous every 24 hours  gabapentin 100 milliGRAM(s) Oral three times a day  glucagon  Injectable 1 milliGRAM(s) IntraMuscular once  insulin lispro (ADMELOG) corrective regimen sliding scale   SubCutaneous three times a day before meals  lisinopril 5 milliGRAM(s) Oral daily  NIFEdipine XL 30 milliGRAM(s) Oral two times a day  polyethylene glycol 3350 17 Gram(s) Oral daily  sodium chloride 0.9%. 1000 milliLiter(s) (75 mL/Hr) IV Continuous <Continuous>    MEDICATIONS  (PRN):  acetaminophen     Tablet .. 650 milliGRAM(s) Oral every 6 hours PRN Temp greater or equal to 38C (100.4F), Mild Pain (1 - 3)  aluminum hydroxide/magnesium hydroxide/simethicone Suspension 30 milliLiter(s) Oral every 4 hours PRN Dyspepsia  dextrose Oral Gel 15 Gram(s) Oral once PRN Blood Glucose LESS THAN 70 milliGRAM(s)/deciliter  meclizine 25 milliGRAM(s) Oral three times a day PRN for dizziness  melatonin 5 milliGRAM(s) Oral at bedtime PRN Insomnia  ondansetron Injectable 4 milliGRAM(s) IV Push every 8 hours PRN Nausea and/or Vomiting  senna 2 Tablet(s) Oral at bedtime PRN Constipation      Allergies  codeine (Hives; Urticaria)        FAMILY HISTORY:  Family history of stroke (Mother)        Vital Signs Last 24 Hrs  T(C): 37.1 (27 Jan 2025 05:20), Max: 37.1 (27 Jan 2025 05:20)  T(F): 98.7 (27 Jan 2025 05:20), Max: 98.7 (27 Jan 2025 05:20)  HR: 62 (27 Jan 2025 05:20) (62 - 77)  BP: 103/60 (27 Jan 2025 05:20) (100/64 - 123/57)  RR: 18 (27 Jan 2025 05:20) (18 - 18)  SpO2: 95% (27 Jan 2025 05:20) (95% - 99%)    Parameters below as of 26 Jan 2025 21:00  Patient On (Oxygen Delivery Method): room air        PHYSICAL EXAM:  GENERAL: NAD, lying in bed comfortably  CHEST/LUNG: Clear to auscultation bilaterally; No rales, rhonchi, wheezing, or rubs. Unlabored respirations  HEART: Regular rate and rhythm; No murmurs, rubs, or gallops  ABDOMEN: Soft, nontender, nondistended  EXTREMITIES:  No clubbing, cyanosis, or edema      
89-year-old female c/o dizziness and fell. Pt now c/o also of unable to ambulate. Pt with  past medical history of diabetes CVA hypertension CAD on aspirin Plavix presents here after mechanical fall.    Today:  Seen at bedside, no overnight events.              REVIEW OF SYSTEMS:  No complaints      MEDICATIONS  (STANDING):  aspirin  chewable 81 milliGRAM(s) Oral daily  atorvastatin 40 milliGRAM(s) Oral at bedtime  cefTRIAXone   IVPB 1000 milliGRAM(s) IV Intermittent every 24 hours  chlorhexidine 2% Cloths 1 Application(s) Topical <User Schedule>  clopidogrel Tablet 75 milliGRAM(s) Oral daily  dextrose 5%. 1000 milliLiter(s) (50 mL/Hr) IV Continuous <Continuous>  dextrose 5%. 1000 milliLiter(s) (100 mL/Hr) IV Continuous <Continuous>  dextrose 50% Injectable 25 Gram(s) IV Push once  dextrose 50% Injectable 12.5 Gram(s) IV Push once  dextrose 50% Injectable 25 Gram(s) IV Push once  enoxaparin Injectable 40 milliGRAM(s) SubCutaneous every 24 hours  gabapentin 100 milliGRAM(s) Oral three times a day  glucagon  Injectable 1 milliGRAM(s) IntraMuscular once  insulin lispro (ADMELOG) corrective regimen sliding scale   SubCutaneous three times a day before meals  lisinopril 10 milliGRAM(s) Oral daily  NIFEdipine XL 30 milliGRAM(s) Oral two times a day  polyethylene glycol 3350 17 Gram(s) Oral daily  sodium chloride 0.9%. 1000 milliLiter(s) (75 mL/Hr) IV Continuous <Continuous>    MEDICATIONS  (PRN):  acetaminophen     Tablet .. 650 milliGRAM(s) Oral every 6 hours PRN Temp greater or equal to 38C (100.4F), Mild Pain (1 - 3)  aluminum hydroxide/magnesium hydroxide/simethicone Suspension 30 milliLiter(s) Oral every 4 hours PRN Dyspepsia  dextrose Oral Gel 15 Gram(s) Oral once PRN Blood Glucose LESS THAN 70 milliGRAM(s)/deciliter  meclizine 25 milliGRAM(s) Oral three times a day PRN for dizziness  melatonin 5 milliGRAM(s) Oral at bedtime PRN Insomnia  ondansetron Injectable 4 milliGRAM(s) IV Push every 8 hours PRN Nausea and/or Vomiting  senna 2 Tablet(s) Oral at bedtime PRN Constipation      Allergies  codeine (Hives; Urticaria)        FAMILY HISTORY:  Family history of stroke (Mother)        Vital Signs Last 24 Hrs  T(C): 36.2 (26 Jan 2025 04:20), Max: 37.1 (25 Jan 2025 14:33)  T(F): 97.2 (26 Jan 2025 04:20), Max: 98.8 (25 Jan 2025 14:33)  HR: 74 (26 Jan 2025 04:20) (74 - 94)  BP: 134/68 (26 Jan 2025 04:20) (131/69 - 139/66)  RR: 18 (26 Jan 2025 04:20) (18 - 18)  SpO2: 98% (26 Jan 2025 04:20) (96% - 98%)    Parameters below as of 26 Jan 2025 04:20  Patient On (Oxygen Delivery Method): room air        PHYSICAL EXAM:  GENERAL: NAD, lying in bed comfortably  CHEST/LUNG: Clear to auscultation bilaterally; No rales, rhonchi, wheezing, or rubs. Unlabored respirations  HEART: Regular rate and rhythm; No murmurs, rubs, or gallops  ABDOMEN: Soft, nontender, nondistended  EXTREMITIES:  No clubbing, cyanosis, or edema    LABS:                        10.6   7.12  )-----------( 164      ( 25 Jan 2025 07:46 )             33.9     01-25    140  |  107  |  11  ----------------------------<  211[H]  4.2   |  19  |  0.9    Ca    8.7      25 Jan 2025 07:46  Mg     1.8     01-25    TPro  6.2  /  Alb  3.5  /  TBili  0.2  /  DBili  x   /  AST  19  /  ALT  13  /  AlkPhos  70  01-25      Urinalysis Basic - ( 25 Jan 2025 07:46 )    Color: x / Appearance: x / SG: x / pH: x  Gluc: 211 mg/dL / Ketone: x  / Bili: x / Urobili: x   Blood: x / Protein: x / Nitrite: x   Leuk Esterase: x / RBC: x / WBC x   Sq Epi: x / Non Sq Epi: x / Bacteria: x      
VAN HARMON 89y Female  MRN#: [id number]     Hospital Day: 1d      SUBJECTIVE   overnight pt dizzy, pt seen and examined this morning.                                          ----------------------------------------------------------  OBJECTIVE  PAST MEDICAL & SURGICAL HISTORY  DM (diabetes mellitus)    HTN (hypertension)    Vertigo    CVA (cerebral vascular accident)    Breast cancer    H/O mastectomy  right                                              -----------------------------------------------------------  ALLERGIES:  codeine (Hives; Urticaria)                                            ------------------------------------------------------------    HOME MEDICATIONS  Home Medications:  atorvastatin 40 mg oral tablet: 1 orally once a day (at bedtime) (30 Nov 2023 15:30)  gabapentin 100 mg oral capsule: 1 cap(s) orally 3 times a day (30 Nov 2023 15:49)  glimepiride 4 mg oral tablet: 1 tab(s) orally 2 times a day (30 Nov 2023 15:29)  metFORMIN 1000 mg oral tablet: 1 tab(s) orally 2 times a day (30 Nov 2023 15:49)  polyethylene glycol 3350 oral powder for reconstitution: 17 gram(s) orally once a day (30 Nov 2023 15:30)  senna leaf extract oral tablet: 2 tab(s) orally once a day (at bedtime) (30 Nov 2023 15:30)                           MEDICATIONS:  STANDING MEDICATIONS  aspirin  chewable 81 milliGRAM(s) Oral daily  atorvastatin 40 milliGRAM(s) Oral at bedtime  cefTRIAXone   IVPB 1000 milliGRAM(s) IV Intermittent every 24 hours  chlorhexidine 2% Cloths 1 Application(s) Topical <User Schedule>  clopidogrel Tablet 75 milliGRAM(s) Oral daily  dextrose 5%. 1000 milliLiter(s) IV Continuous <Continuous>  dextrose 5%. 1000 milliLiter(s) IV Continuous <Continuous>  dextrose 50% Injectable 25 Gram(s) IV Push once  dextrose 50% Injectable 12.5 Gram(s) IV Push once  dextrose 50% Injectable 25 Gram(s) IV Push once  enoxaparin Injectable 40 milliGRAM(s) SubCutaneous every 24 hours  gabapentin 100 milliGRAM(s) Oral three times a day  glucagon  Injectable 1 milliGRAM(s) IntraMuscular once  insulin lispro (ADMELOG) corrective regimen sliding scale   SubCutaneous three times a day before meals  lisinopril 10 milliGRAM(s) Oral daily  NIFEdipine XL 30 milliGRAM(s) Oral two times a day  polyethylene glycol 3350 17 Gram(s) Oral daily  sodium chloride 0.9%. 1000 milliLiter(s) IV Continuous <Continuous>    PRN MEDICATIONS  acetaminophen     Tablet .. 650 milliGRAM(s) Oral every 6 hours PRN  aluminum hydroxide/magnesium hydroxide/simethicone Suspension 30 milliLiter(s) Oral every 4 hours PRN  dextrose Oral Gel 15 Gram(s) Oral once PRN  meclizine 25 milliGRAM(s) Oral three times a day PRN  melatonin 5 milliGRAM(s) Oral at bedtime PRN  ondansetron Injectable 4 milliGRAM(s) IV Push every 8 hours PRN  senna 2 Tablet(s) Oral at bedtime PRN                                            ------------------------------------------------------------  VITAL SIGNS: Last 24 Hours  T(C): 36.4 (24 Jan 2025 04:20), Max: 36.8 (23 Jan 2025 17:47)  T(F): 97.6 (24 Jan 2025 04:20), Max: 98.3 (23 Jan 2025 17:47)  HR: 58 (24 Jan 2025 04:20) (58 - 83)  BP: 113/52 (24 Jan 2025 04:20) (113/52 - 188/74)  BP(mean): --  RR: 18 (24 Jan 2025 04:20) (18 - 18)  SpO2: 100% (24 Jan 2025 04:20) (95% - 100%)      01-23-25 @ 07:01  -  01-24-25 @ 07:00  --------------------------------------------------------  IN: 0 mL / OUT: 100 mL / NET: -100 mL                                             --------------------------------------------------------------  LABS:                        10.4   8.82  )-----------( 151      ( 24 Jan 2025 05:35 )             32.4     01-24    143  |  107  |  12  ----------------------------<  78  4.2   |  26  |  0.9    Ca    8.9      24 Jan 2025 05:35  Mg     2.5     01-24    TPro  6.8  /  Alb  4.1  /  TBili  0.3  /  DBili  x   /  AST  26  /  ALT  18  /  AlkPhos  72  01-23      Urinalysis Basic - ( 24 Jan 2025 05:35 )    Color: x / Appearance: x / SG: x / pH: x  Gluc: 78 mg/dL / Ketone: x  / Bili: x / Urobili: x   Blood: x / Protein: x / Nitrite: x   Leuk Esterase: x / RBC: x / WBC x   Sq Epi: x / Non Sq Epi: x / Bacteria: x        Lactate, Blood: 2.2 mmol/L *H* (01-23-25 @ 22:12)        Urinalysis with Rflx Culture (collected 23 Jan 2025 20:42)                                                    -------------------------------------------------------------  RADIOLOGY:  CXR  Xray Chest 1 View- PORTABLE-Urgent:   ACC: [id number] EXAM:  XR CHEST PORTABLE URGENT 1V   ORDERED BY: SUSANNA CHEN     PROCEDURE DATE:  01/23/2025          INTERPRETATION:  CLINICAL HISTORY: Trauma    COMPARISON: December 22, 2024    TECHNIQUE: Portable frontal chest radiograph.    FINDINGS:    Support devices: None.    Cardiac/mediastinum/hilum: Heart size within normal limits, thoracic   aortic calcification.    Lung parenchyma/Pleura: No focal parenchymal opacities, pleural   effusions, or pneumothorax.    Skeleton/soft tissues: Stable. Scoliosis. Right axillary surgical clips.   Left axillary calcification.      IMPRESSION:    No radiographic evidence of acute cardiopulmonary disease.    --- End of Report ---            JULIO MORALES MD; Attending Radiologist  This document has been electronically signed. Jan 24 2025  5:37AM (01-23-25 @ 19:44)      CT                                            --------------------------------------------------------------    PHYSICAL EXAM:  GENERAL: NAD, lying in bed comfortably  CHEST/LUNG: Clear to auscultation bilaterally; No rales, rhonchi, wheezing, or rubs. Unlabored respirations  HEART: Regular rate and rhythm; No murmurs, rubs, or gallops  ABDOMEN: Soft, nontender, nondistended  EXTREMITIES:  No clubbing, cyanosis, or edema

## 2025-01-27 NOTE — PROGRESS NOTE ADULT - ASSESSMENT
89-year-old female c/o dizziness and fell. Pt now c/o also of unable to ambulate. Pt with  past medical history of diabetes CVA hypertension CAD on aspirin Plavix presents here after mechanical fall.     #Fall  # lightheadedness, likely age-related debility  #Hx CVA  #Hospital delirium  -CTH noted with increased ventriculomegaly with mild transependymal edema, chronic scattered infarcts  -Neurology eval appreciated, no further work up inpatient  -PT consult-STR  -orthostatics negative   -fall precautions     #Simple UTI, NO SEPSIS, mild lactic acidosis likely from fall  -ok with rocephin 3 days, pt endorsing some burning  -urine culture- e coli, sens pending     #HTN  #CAD  #HLD  #DM  -c/w home meds     dvt ppx    DC planning to STR
89-year-old female c/o dizziness and fell. Pt now c/o also of unable to ambulate. Pt with  past medical history of diabetes CVA hypertension CAD on aspirin Plavix presents here after mechanical fall.     #Fall  # lightheadedness, likely age-related debility  #Hx CVA  -CTH noted with increased ventriculomegaly with mild transependymal edema, chronic scattered infarcts  -Neurology eval appreciated, no further work up inpatient  -PT consult-STR  -orthostatics negative   -fall precautions     #Simple UTI, NO SEPSIS, mild lactic acidosis likely from fall  -ok with rocephin 3 days, pt endorsing some burning  -urine culture- e coli, sens pending     #HTN  #CAD  #HLD  #DM  -c/w home meds     dvt ppx    DC planning to STR
89-year-old female c/o dizziness and fell. Pt now c/o also of unable to ambulate. Pt with  past medical history of diabetes CVA hypertension CAD on aspirin Plavix presents here after mechanical fall.     #Fall  # lightheadedness, likely age-related debility  #Hx CVA  #Hospital delirium  -CTH noted with increased ventriculomegaly with mild transependymal edema, chronic scattered infarcts  -Neurology eval appreciated, no further work up inpatient  -PT consult-STR  -orthostatics negative   -fall precautions     #Simple UTI, NO SEPSIS, mild lactic acidosis likely from fall  -ok with rocephin 3 days, pt endorsing some burning  -urine culture- e coli sensitive to Rocephin    #HTN  #CAD  #HLD  #DM  -c/w home meds     dvt ppx    DC planning to STR
89-year-old female c/o dizziness and fell. Pt now c/o also of unable to ambulate. Pt with  past medical history of diabetes CVA hypertension CAD on aspirin Plavix presents here after mechanical fall.     #Fall  #Dizziness? vs lightheadedness  #Hx CVA  -CTH noted with increased ventriculomegaly with mild transependymal edema, chronic scattered infarcts  -Neurology eval  -PT consult  -orthostatics negative   -fall precautions     #Simple UTI, NO SEPSIS, mild lactic acidosis likely from fall  -repeat lactate  -ok with rocephin 3 days, pt endorsing some burning  -ID was consulted by admitting team  -f/u urine culture     #HTN  #CAD  #HLD  #DM  -c/w home meds     dvt ppx    Pending: neuro eval, abx, needs PT, might need rehab

## 2025-01-28 ENCOUNTER — TRANSCRIPTION ENCOUNTER (OUTPATIENT)
Age: 89
End: 2025-01-28

## 2025-01-28 VITALS — DIASTOLIC BLOOD PRESSURE: 68 MMHG | SYSTOLIC BLOOD PRESSURE: 105 MMHG | HEART RATE: 71 BPM

## 2025-01-28 LAB
GLUCOSE BLDC GLUCOMTR-MCNC: 234 MG/DL — HIGH (ref 70–99)
GLUCOSE BLDC GLUCOMTR-MCNC: 288 MG/DL — HIGH (ref 70–99)
GLUCOSE BLDC GLUCOMTR-MCNC: 308 MG/DL — HIGH (ref 70–99)

## 2025-01-28 PROCEDURE — 99239 HOSP IP/OBS DSCHRG MGMT >30: CPT

## 2025-01-28 RX ADMIN — Medication 8: at 16:36

## 2025-01-28 RX ADMIN — ENOXAPARIN SODIUM 40 MILLIGRAM(S): 100 INJECTION SUBCUTANEOUS at 16:36

## 2025-01-28 RX ADMIN — GABAPENTIN 100 MILLIGRAM(S): 800 TABLET ORAL at 13:09

## 2025-01-28 RX ADMIN — Medication 5 MILLIGRAM(S): at 05:52

## 2025-01-28 RX ADMIN — GABAPENTIN 100 MILLIGRAM(S): 800 TABLET ORAL at 05:52

## 2025-01-28 RX ADMIN — ANTISEPTIC SURGICAL SCRUB 1 APPLICATION(S): 0.04 SOLUTION TOPICAL at 05:52

## 2025-01-28 RX ADMIN — Medication 6: at 11:37

## 2025-01-28 RX ADMIN — Medication 75 MILLIGRAM(S): at 11:12

## 2025-01-28 RX ADMIN — ASPIRIN 81 MILLIGRAM(S): 81 TABLET, COATED ORAL at 11:12

## 2025-01-28 RX ADMIN — POLYETHYLENE GLYCOL 3350 17 GRAM(S): 17 POWDER, FOR SOLUTION ORAL at 11:12

## 2025-01-28 RX ADMIN — Medication 4: at 07:45

## 2025-01-28 NOTE — DISCHARGE NOTE NURSING/CASE MANAGEMENT/SOCIAL WORK - PATIENT PORTAL LINK FT
You can access the FollowMyHealth Patient Portal offered by [facility] by registering at the following website: [email or web address]. By joining [facility]’s FollowMyHealth portal, you will also be able to view your health information using other applications (apps) compatible with our system.

## 2025-01-28 NOTE — DISCHARGE NOTE PROVIDER - NSDCFUSCHEDAPPT_GEN_ALL_CORE_FT
Abby Mcfadden  [facility] Physician [facility]  NEUROLOGY [address] [place] [address]  Scheduled Appointment: 04/23/2025

## 2025-01-28 NOTE — DISCHARGE NOTE PROVIDER - NSDCCPCAREPLAN_GEN_ALL_CORE_FT
PRINCIPAL DISCHARGE DIAGNOSIS  Diagnosis: Fall  Assessment and Plan of Treatment: We reduced the dose of your blood pressure medication lisinopril.  This could prevent lightheadedness and falls in the future.  Follow with your PMD after rehab.      SECONDARY DISCHARGE DIAGNOSES  Diagnosis: Acute UTI  Assessment and Plan of Treatment: You were treated for a UTI with antibiotics.

## 2025-01-28 NOTE — DISCHARGE NOTE PROVIDER - HOSPITAL COURSE
89-year-old female c/o dizziness and fell. Pt now c/o also of unable to ambulate. Pt with  past medical history of diabetes CVA hypertension CAD on aspirin Plavix presents here after mechanical fall.     #Fall  # lightheadedness, likely age-related debility  #Hx CVA  #Hospital delirium-resolved  -CTH noted with increased ventriculomegaly with mild transependymal edema, chronic scattered infarcts  -Neurology eval appreciated, no further work up inpatient  -PT consult-STR  -orthostatics negative   -fall precautions   -decreased dose of lisinopril, possibly contributory, BP better    #Simple UTI, NO SEPSIS, mild lactic acidosis likely from fall  -ok with rocephin 3 days, pt endorsing some burning  -urine culture- e coli sensitive to Rocephin    #HTN  #CAD  #HLD  #DM  -c/w home meds     dvt ppx    DC to STR

## 2025-01-28 NOTE — CDI QUERY NOTE - NSCDIOTHERTXTBX_GEN_ALL_CORE_HH
Clinical documentation and/or evidence of the patient’s presentation, evaluation, and medical management, as evidenced below, may support a diagnosis that is not documented in the medical record.  In order to ensure accurate coding and accuracy of the clinical record, the documentation in this patient’s medical record requires additional clarification.      If you think the supporting documentation and/or clinical evidence supports a more specific diagnosis, please include more specific documentation of a diagnosis associated with these findings in your progress note and/or discharge summary.  	  Based on your professional judgment and clinical indicators, can the dizziness and unable to ambulate be further specified as:   •      Dizziness and unable to ambulate associated with late effects of previous CVA  •      Dizziness and unable to ambulate unrelated with previous CVA  •	Other (specify)    Supporting documentation and/or clinical evidence:   CT Head;  1/23/25: IMPRESSION:   Slightly increased moderate ventriculomegaly with mild transependymal  edema since the prior CT head from 12/22/2024.  Stable mild-moderate chronic microvascular ischemic changes and small scattered chronic infarcts.    Documentation:   1/27/25:  Progress note:  Attending:  · Assessment:  89-year-old female c/o dizziness and fell. Pt now c/o also of unable to ambulate. Pt with  past medical history of ---- CVA  on aspirin Plavix presents here after mechanical fall.    # Fall      #Hx CVA  -CTH noted with increased ventriculomegaly with mild transependymal edema, chronic scattered infarcts    Meds:  asa,  clopidogrel, enoxaparin, atorvastatin        Thank you.

## 2025-01-28 NOTE — DISCHARGE NOTE NURSING/CASE MANAGEMENT/SOCIAL WORK - NSDCVIVACCINE_GEN_ALL_CORE_FT
Tdap; 22-Dec-2024 21:31; Jose Cobos (RN); [employer]; [id number] (Exp. Date: 01-Aug-2026); IntraMuscular; Deltoid Right.; 0.5 milliLiter(s); VIS (VIS Published: 09-May-2013, VIS Presented: 22-Dec-2024);

## 2025-01-28 NOTE — DISCHARGE NOTE PROVIDER - NSDCQMAMI_CARD_ALL_CORE
[address] [place] [address], DO  [address] [facility], [address], [address]  [phone]        ASSESSMENT AND PLAN    Problem List Items Addressed This Visit          Other    Encounter to establish care with new doctor - Primary    Epigastric pain     Patient with 2 to 3 months of epigastric pain, nausea, vomiting and some diarrhea at times. Has been to multiple urgent cares and received different medicines without relief. Most recently started on pantoprazole and dicyclomine 4 days ago but has not noticed any significant improvement. Denies fever or urinary symptoms. Exam shows epigastric tenderness with right upper quadrant tenderness with positive Lara sign on exam today. Will order right upper quadrant ultrasound. We will switch to Pepcid twice a day for more immediate relief and discussed use of dicyclomine if needed for cramping. Discussed bland diet and will refer to GI for follow-up. As patient will be leaving for [employer], recommend following up during her fall break. Relevant Orders    Comprehensive Metabolic Panel    CBC with Auto Differential    Lipase    US GALLBLADDER RUQ    Amb External Referral To Gastroenterology    Right upper quadrant abdominal tenderness with rebound tenderness    Relevant Orders    US GALLBLADDER RUQ    Amb External Referral To Gastroenterology     Other Visit Diagnoses       Non-intractable vomiting with nausea, unspecified vomiting type        Relevant Orders    Amb External Referral To Gastroenterology    Diarrhea, unspecified type        Relevant Orders    Amb External Referral To Gastroenterology             The diagnoses and plan were discussed with the patient, who verbalizes understanding and agrees with plan. All questions answered.     Chief Complaint    Chief Complaint   Patient presents with    Establish Care    Abdominal Pain     Pt states she has had pain since the summer started and tried to change diet but nothing seems to help         HISTORY OF PRESENT ILLNESS    25 y.o. female presents today to establish care with a new primary care provider. States that she was started on Pantoprazole and Dicyclomine by urgent cares due to epigastric pain and cramping that started at the beginning of the summer. Denies similar problems in the past.  Notes nausea at times and vomited at times. States that spicy foods tend to make it worse. States that she also gets watery diarrhea. States that she has tried to eat more bland food, which helps. States that she does not feel like the medicine helps much. States that it comes in waves. Denies fevers. Denies urinary symptoms. Notes that her father has acid reflux. Denies known food allergies. Denies new medicines or vitamins at the beginning of the summer. States that she was prescribed a nausea medicine at one point but does not think it helped. Denies cough or congestion. Denies trying any drugs or drinking alcohol prior to onset of pain. PAST MEDICAL HISTORY    History reviewed. No pertinent past medical history. PAST SURGICAL HISTORY    No past surgical history on file. FAMILY HISTORY    No family history on file. SOCIAL HISTORY    Social History     Socioeconomic History    Marital status: Single     Spouse name: None    Number of children: None    Years of education: None    Highest education level: None   Tobacco Use    Smoking status: Never     Passive exposure: Never    Smokeless tobacco: Never   Substance and Sexual Activity    Alcohol use: Never    Drug use: Never    Sexual activity: Not Currently       MEDICATIONS      Current Outpatient Medications:     dicyclomine (BENTYL) 20 MG tablet, Take 20 mg by mouth in the morning and 20 mg at noon and 20 mg in the evening and 20 mg before bedtime. , Disp: , Rfl:     famotidine (PEPCID) 20 MG tablet, Take 1 tablet by mouth in the morning and 1 tablet before bedtime. , Disp: 60 tablet, Rfl: 3    ALLERGIES / INTOLERANCES    Allergies   Allergen Reactions    Amoxicillin Rash       REVIEW OF SYSTEMS    Review of Systems   Constitutional:  Negative for fever. HENT:  Negative for congestion. Respiratory:  Negative for cough and shortness of breath. Cardiovascular:  Negative for chest pain. Gastrointestinal:  Positive for abdominal pain, diarrhea, nausea and vomiting. Negative for abdominal distention, blood in stool and constipation. Genitourinary:  Negative for difficulty urinating and dysuria. Psychiatric/Behavioral:  Negative for dysphoric mood. PHYSICAL EXAMINATION    Vitals:    08/16/22 1124   BP: 114/62   Pulse: 53   SpO2: 99%       Physical Exam  Vitals and nursing note reviewed. Constitutional:       General: She is not in acute distress. Appearance: Normal appearance. HENT:      Head: Normocephalic and atraumatic. Right Ear: External ear normal.      Left Ear: External ear normal.      Nose: Nose normal.   Eyes:      Extraocular Movements: Extraocular movements intact. Pupils: Pupils are equal, round, and reactive to light. Cardiovascular:      Rate and Rhythm: Normal rate and regular rhythm. Heart sounds: Normal heart sounds. No murmur heard. Pulmonary:      Effort: Pulmonary effort is normal. No respiratory distress. Breath sounds: Normal breath sounds. Abdominal:      General: Bowel sounds are normal. There is no distension. Palpations: Abdomen is soft. Tenderness: abdominal tenderness in the right upper quadrant There is no right CVA tenderness, left CVA tenderness or guarding. Positive signs include Lara's sign. Musculoskeletal:         General: Normal range of motion. Cervical back: Normal range of motion. Skin:     General: Skin is warm. Findings: No rash. Neurological:      General: No focal deficit present. Mental Status: She is alert.    Psychiatric:         Mood and Affect: Mood normal. Behavior: Behavior normal.           Isabel Carrion DO  12:37 PM  08/16/22 No

## 2025-01-28 NOTE — DISCHARGE NOTE PROVIDER - NSDCMRMEDTOKEN_GEN_ALL_CORE_FT
aspirin 81 mg oral tablet: 1 tab(s) orally once a day  atorvastatin 40 mg oral tablet: 1 orally once a day (at bedtime)  gabapentin 100 mg oral capsule: 1 cap(s) orally 3 times a day  glimepiride 4 mg oral tablet: 1 tab(s) orally 2 times a day  meclizine 25 mg oral tablet: 1 tab(s) orally 3 times a day as needed for  dizziness  metFORMIN 1000 mg oral tablet: 1 tab(s) orally 2 times a day  NIFEdipine 30 mg oral tablet, extended release: 1 tab(s) orally 2 times a day  Plavix 75 mg oral tablet: 1 tab(s) orally once a day  polyethylene glycol 3350 oral powder for reconstitution: 17 gram(s) orally once a day  senna leaf extract oral tablet: 2 tab(s) orally once a day (at bedtime)  Zestril 5 mg oral tablet: 1 tab(s) orally once a day

## 2025-01-28 NOTE — DISCHARGE NOTE NURSING/CASE MANAGEMENT/SOCIAL WORK - FINANCIAL ASSISTANCE
[facility] provides services at a reduced cost to those who are determined to be eligible through [facility]’s financial assistance program. Information regarding [facility]’s financial assistance program can be found by going to [email or web address] or by calling [phone].

## 2025-01-28 NOTE — DISCHARGE NOTE PROVIDER - CARE PROVIDER_API CALL
Brenton Duckworth  Internal Medicine  [address] [place] [address]  Phone: [phone]  Fax: [phone]  Follow Up Time:

## 2025-01-29 LAB
CULTURE RESULTS: SIGNIFICANT CHANGE UP
CULTURE RESULTS: SIGNIFICANT CHANGE UP
SPECIMEN SOURCE: SIGNIFICANT CHANGE UP
SPECIMEN SOURCE: SIGNIFICANT CHANGE UP

## 2025-02-03 ENCOUNTER — EMERGENCY (EMERGENCY)
Facility: HOSPITAL | Age: 89
LOS: 0 days | Discharge: HOME | End: 2025-02-03
Attending: EMERGENCY MEDICINE
Payer: MEDICARE

## 2025-02-03 VITALS
RESPIRATION RATE: 18 BRPM | OXYGEN SATURATION: 98 % | DIASTOLIC BLOOD PRESSURE: 62 MMHG | HEART RATE: 74 BPM | SYSTOLIC BLOOD PRESSURE: 118 MMHG | TEMPERATURE: 98 F

## 2025-02-03 VITALS
TEMPERATURE: 98 F | SYSTOLIC BLOOD PRESSURE: 112 MMHG | WEIGHT: 115.08 LBS | HEART RATE: 76 BPM | OXYGEN SATURATION: 97 % | RESPIRATION RATE: 18 BRPM | DIASTOLIC BLOOD PRESSURE: 53 MMHG | HEIGHT: 62 IN

## 2025-02-03 DIAGNOSIS — R33.9 RETENTION OF URINE, UNSPECIFIED: ICD-10-CM

## 2025-02-03 DIAGNOSIS — S09.90XA UNSPECIFIED INJURY OF HEAD, INITIAL ENCOUNTER: ICD-10-CM
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
## 2025-02-03 DIAGNOSIS — Z90.10 ACQUIRED ABSENCE OF UNSPECIFIED BREAST AND NIPPLE: Chronic | ICD-10-CM

## 2025-02-03 DIAGNOSIS — R14.0 ABDOMINAL DISTENSION (GASEOUS): ICD-10-CM

## 2025-02-03 DIAGNOSIS — Z88.5 ALLERGY STATUS TO NARCOTIC AGENT: ICD-10-CM
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
## 2025-02-03 DIAGNOSIS — S00.83XA CONTUSION OF OTHER PART OF HEAD, INITIAL ENCOUNTER: ICD-10-CM

## 2025-02-03 LAB
ALBUMIN SERPL ELPH-MCNC: 3.6 G/DL — SIGNIFICANT CHANGE UP (ref 3.5–5.2)
ALP SERPL-CCNC: 204 U/L — HIGH (ref 30–115)
ALT FLD-CCNC: 64 U/L — HIGH (ref 0–41)
ANION GAP SERPL CALC-SCNC: 13 MMOL/L — SIGNIFICANT CHANGE UP (ref 7–14)
APPEARANCE UR: CLEAR — SIGNIFICANT CHANGE UP
APTT BLD: 32.4 SEC — SIGNIFICANT CHANGE UP (ref 27–39.2)
AST SERPL-CCNC: 31 U/L — SIGNIFICANT CHANGE UP (ref 0–41)
BASOPHILS # BLD AUTO: 0.03 K/UL — SIGNIFICANT CHANGE UP (ref 0–0.2)
BASOPHILS NFR BLD AUTO: 0.4 % — SIGNIFICANT CHANGE UP (ref 0–1)
BILIRUB SERPL-MCNC: 0.2 MG/DL — SIGNIFICANT CHANGE UP (ref 0.2–1.2)
BILIRUB UR-MCNC: NEGATIVE — SIGNIFICANT CHANGE UP
BUN SERPL-MCNC: 25 MG/DL — HIGH (ref 10–20)
CALCIUM SERPL-MCNC: 9.6 MG/DL — SIGNIFICANT CHANGE UP (ref 8.4–10.5)
CHLORIDE SERPL-SCNC: 104 MMOL/L — SIGNIFICANT CHANGE UP (ref 98–110)
CK SERPL-CCNC: 73 U/L — SIGNIFICANT CHANGE UP (ref 0–225)
CO2 SERPL-SCNC: 21 MMOL/L — SIGNIFICANT CHANGE UP (ref 17–32)
COLOR SPEC: YELLOW — SIGNIFICANT CHANGE UP
CREAT SERPL-MCNC: 1 MG/DL — SIGNIFICANT CHANGE UP (ref 0.7–1.5)
DIFF PNL FLD: NEGATIVE — SIGNIFICANT CHANGE UP
EGFR: 54 ML/MIN/1.73M2 — LOW
EOSINOPHIL # BLD AUTO: 0.14 K/UL — SIGNIFICANT CHANGE UP (ref 0–0.7)
EOSINOPHIL NFR BLD AUTO: 1.8 % — SIGNIFICANT CHANGE UP (ref 0–8)
GLUCOSE SERPL-MCNC: 130 MG/DL — HIGH (ref 70–99)
GLUCOSE UR QL: NEGATIVE MG/DL — SIGNIFICANT CHANGE UP
HCT VFR BLD CALC: 31.6 % — LOW (ref 37–47)
HGB BLD-MCNC: 10.3 G/DL — LOW (ref 12–16)
IMM GRANULOCYTES NFR BLD AUTO: 0.4 % — HIGH (ref 0.1–0.3)
INR BLD: 1.03 RATIO — SIGNIFICANT CHANGE UP (ref 0.65–1.3)
KETONES UR-MCNC: NEGATIVE MG/DL — SIGNIFICANT CHANGE UP
LACTATE SERPL-SCNC: 2.8 MMOL/L — HIGH (ref 0.7–2)
LEUKOCYTE ESTERASE UR-ACNC: NEGATIVE — SIGNIFICANT CHANGE UP
LYMPHOCYTES # BLD AUTO: 1.66 K/UL — SIGNIFICANT CHANGE UP (ref 1.2–3.4)
LYMPHOCYTES # BLD AUTO: 21.6 % — SIGNIFICANT CHANGE UP (ref 20.5–51.1)
MCHC RBC-ENTMCNC: 27.4 PG — SIGNIFICANT CHANGE UP (ref 27–31)
MCHC RBC-ENTMCNC: 32.6 G/DL — SIGNIFICANT CHANGE UP (ref 32–37)
MCV RBC AUTO: 84 FL — SIGNIFICANT CHANGE UP (ref 81–99)
MONOCYTES # BLD AUTO: 0.54 K/UL — SIGNIFICANT CHANGE UP (ref 0.1–0.6)
MONOCYTES NFR BLD AUTO: 7 % — SIGNIFICANT CHANGE UP (ref 1.7–9.3)
NEUTROPHILS # BLD AUTO: 5.28 K/UL — SIGNIFICANT CHANGE UP (ref 1.4–6.5)
NEUTROPHILS NFR BLD AUTO: 68.8 % — SIGNIFICANT CHANGE UP (ref 42.2–75.2)
NITRITE UR-MCNC: NEGATIVE — SIGNIFICANT CHANGE UP
NRBC # BLD: 0 /100 WBCS — SIGNIFICANT CHANGE UP (ref 0–0)
NRBC BLD-RTO: 0 /100 WBCS — SIGNIFICANT CHANGE UP (ref 0–0)
PH UR: 6 — SIGNIFICANT CHANGE UP (ref 5–8)
PLATELET # BLD AUTO: 311 K/UL — SIGNIFICANT CHANGE UP (ref 130–400)
PMV BLD: 8.4 FL — SIGNIFICANT CHANGE UP (ref 7.4–10.4)
POTASSIUM SERPL-MCNC: 5.3 MMOL/L — HIGH (ref 3.5–5)
POTASSIUM SERPL-SCNC: 5.3 MMOL/L — HIGH (ref 3.5–5)
PROT SERPL-MCNC: 6.1 G/DL — SIGNIFICANT CHANGE UP (ref 6–8)
PROT UR-MCNC: NEGATIVE MG/DL — SIGNIFICANT CHANGE UP
PROTHROM AB SERPL-ACNC: 12.2 SEC — SIGNIFICANT CHANGE UP (ref 9.95–12.87)
RBC # BLD: 3.76 M/UL — LOW (ref 4.2–5.4)
RBC # FLD: 13.6 % — SIGNIFICANT CHANGE UP (ref 11.5–14.5)
SODIUM SERPL-SCNC: 138 MMOL/L — SIGNIFICANT CHANGE UP (ref 135–146)
SP GR SPEC: 1.02 — SIGNIFICANT CHANGE UP (ref 1–1.03)
UROBILINOGEN FLD QL: 0.2 MG/DL — SIGNIFICANT CHANGE UP (ref 0.2–1)
WBC # BLD: 7.68 K/UL — SIGNIFICANT CHANGE UP (ref 4.8–10.8)
WBC # FLD AUTO: 7.68 K/UL — SIGNIFICANT CHANGE UP (ref 4.8–10.8)

## 2025-02-03 PROCEDURE — 83605 ASSAY OF LACTIC ACID: CPT

## 2025-02-03 PROCEDURE — 71045 X-RAY EXAM CHEST 1 VIEW: CPT | Mod: 26

## 2025-02-03 PROCEDURE — 93005 ELECTROCARDIOGRAM TRACING: CPT

## 2025-02-03 PROCEDURE — 71260 CT THORAX DX C+: CPT | Mod: MC

## 2025-02-03 PROCEDURE — 74177 CT ABD & PELVIS W/CONTRAST: CPT | Mod: 26

## 2025-02-03 PROCEDURE — 36000 PLACE NEEDLE IN VEIN: CPT | Mod: XU

## 2025-02-03 PROCEDURE — 99285 EMERGENCY DEPT VISIT HI MDM: CPT

## 2025-02-03 PROCEDURE — 72170 X-RAY EXAM OF PELVIS: CPT | Mod: 26

## 2025-02-03 PROCEDURE — 72170 X-RAY EXAM OF PELVIS: CPT

## 2025-02-03 PROCEDURE — 82550 ASSAY OF CK (CPK): CPT

## 2025-02-03 PROCEDURE — 85610 PROTHROMBIN TIME: CPT

## 2025-02-03 PROCEDURE — 51702 INSERT TEMP BLADDER CATH: CPT

## 2025-02-03 PROCEDURE — 85025 COMPLETE CBC W/AUTO DIFF WBC: CPT

## 2025-02-03 PROCEDURE — 85730 THROMBOPLASTIN TIME PARTIAL: CPT

## 2025-02-03 PROCEDURE — 72125 CT NECK SPINE W/O DYE: CPT | Mod: MC

## 2025-02-03 PROCEDURE — 70450 CT HEAD/BRAIN W/O DYE: CPT | Mod: 26

## 2025-02-03 PROCEDURE — 81003 URINALYSIS AUTO W/O SCOPE: CPT

## 2025-02-03 PROCEDURE — 93010 ELECTROCARDIOGRAM REPORT: CPT

## 2025-02-03 PROCEDURE — 70450 CT HEAD/BRAIN W/O DYE: CPT | Mod: MC

## 2025-02-03 PROCEDURE — 80053 COMPREHEN METABOLIC PANEL: CPT

## 2025-02-03 PROCEDURE — 71045 X-RAY EXAM CHEST 1 VIEW: CPT

## 2025-02-03 PROCEDURE — 72125 CT NECK SPINE W/O DYE: CPT | Mod: 26

## 2025-02-03 PROCEDURE — 71260 CT THORAX DX C+: CPT | Mod: 26

## 2025-02-03 PROCEDURE — 74177 CT ABD & PELVIS W/CONTRAST: CPT | Mod: MC

## 2025-02-03 PROCEDURE — 36415 COLL VENOUS BLD VENIPUNCTURE: CPT

## 2025-02-03 PROCEDURE — 99285 EMERGENCY DEPT VISIT HI MDM: CPT | Mod: 25

## 2025-02-03 NOTE — ED ADULT NURSE NOTE - NSFALLHARMRISKINTERV_ED_ALL_ED

## 2025-02-03 NOTE — ED ADULT TRIAGE NOTE - CHIEF COMPLAINT QUOTE
pt BIBA for fall out of bed. pt was found at 6:00am. unsure how she fell out of bed. fall was unwitnessed. pt presents with bruising to the R side of the head. on plavix and aspirin. BEFAST neg.

## 2025-02-03 NOTE — ED ADULT NURSE NOTE - NS_SISCREENINGSR_GEN_ALL_ED
Negative Same Histology In Subsequent Stages Text: The pattern and morphology of the tumor is as described in the first stage.

## 2025-02-03 NOTE — ED ADULT NURSE NOTE - NS ED NURSE RECORD ANOTHER HT AND WT
Thyroid level is now normal, continue current dose of levothyroxine and recheck TSH and free T4 before next follow-up. Yes

## 2025-02-03 NOTE — ED PROVIDER NOTE - NSFOLLOWUPINSTRUCTIONS_ED_ALL_ED_FT
Our Emergency Department Referral Coordinators will be reaching out to you in the next 24-48 hours from 9:00am to 5:00pm with a follow up appointment. Please expect a phone call from the hospital in that time frame. If you do not receive a call or if you have any questions or concerns, you can reach them at   [phone] Our Emergency Department Referral Coordinators will be reaching out to you in the next 24-48 hours from 9:00am to 5:00pm with a follow up appointment. Please expect a phone call from the hospital in that time frame. If you do not receive a call or if you have any questions or concerns, you can reach them at   [phone]    PT NEEDS A UROLOGIST FOR FURTHER EVALUATION AND  TO REMOVE CARRASCO

## 2025-02-03 NOTE — ED PROVIDER NOTE - ATTENDING APP SHARED VISIT CONTRIBUTION OF CARE
Pleasantly demented 89-year-old sent from nursing home after she apparently rolled out of bed, has bruising of her right forehead, patient self denies pain but complains of abdominal bloating, on exam vitals appreciated, well-appearing, head normocephalic with right forehead bruise, PERRLA, neck supple no CTLs TTP, cor regular, lungs clear, abdomen with suprapubic distention, nontender, pelvis stable, full range of motion x 4, neurologically nonfocal, labsappreciated, Marrero placed with 1 L urine output, will DC with leg bag to follow-up with urology

## 2025-02-03 NOTE — ED PROVIDER NOTE - PATIENT PORTAL LINK FT
You can access the FollowMyHealth Patient Portal offered by [facility] by registering at the following website: [email or web address]. By joining [employer]’s FollowMyHealth portal, you will also be able to view your health information using other applications (apps) compatible with our system.

## 2025-02-03 NOTE — ED PROVIDER NOTE - WR INTERPRETATION 1
2019 novel coronavirus disease (COVID-19) MSK XR negative - No fracture, No dislocation, No foreign body

## 2025-02-03 NOTE — ED PROVIDER NOTE - OBJECTIVE STATEMENT
Pt is a 89y/o female here for eval of right forehead brusiing after fall out of bed this morning at [facility] facility. Pt sts she woke up on floor and screamed for help, then was put back in her bed, Pt denies neck pain, cp, sob. Admits to associated abd bloating

## 2025-02-03 NOTE — ED PROVIDER NOTE - PHYSICAL EXAMINATION
VITAL SIGNS: I have reviewed nursing notes and confirm.  CONSTITUTIONAL: Well-developed; well-nourished  SKIN: ecchymosis to right forehead  HEAD: Normocephalic  EYES conjunctiva and sclera clear.  ENT: No nasal discharge; airway clear.  Neck: no c spine tenderness   CARD: S1, S2 normal; no murmurs, gallops, or rubs. Regular rate and rhythm.   RESP: No wheezes, rales or rhonchi.  ABD: Normal bowel sounds; soft; non-distended; non-tender  EXT: Normal ROM.  No clubbing, cyanosis or edema.   NEURO: Alert, oriented, grossly unremarkable

## 2025-02-05 DIAGNOSIS — L24.A2 IRRITANT CONTACT DERMATITIS DUE TO FECAL, URINARY OR DUAL INCONTINENCE: ICD-10-CM

## 2025-02-05 DIAGNOSIS — R42 DIZZINESS AND GIDDINESS: ICD-10-CM

## 2025-02-05 DIAGNOSIS — D84.9 IMMUNODEFICIENCY, UNSPECIFIED: ICD-10-CM

## 2025-02-05 DIAGNOSIS — E11.65 TYPE 2 DIABETES MELLITUS WITH HYPERGLYCEMIA: ICD-10-CM

## 2025-02-05 DIAGNOSIS — N39.0 URINARY TRACT INFECTION, SITE NOT SPECIFIED: ICD-10-CM

## 2025-02-05 DIAGNOSIS — R26.2 DIFFICULTY IN WALKING, NOT ELSEWHERE CLASSIFIED: ICD-10-CM

## 2025-02-05 DIAGNOSIS — Z79.82 LONG TERM (CURRENT) USE OF ASPIRIN: ICD-10-CM

## 2025-02-05 DIAGNOSIS — B96.20 UNSPECIFIED ESCHERICHIA COLI [E. COLI] AS THE CAUSE OF DISEASES CLASSIFIED ELSEWHERE: ICD-10-CM

## 2025-02-05 DIAGNOSIS — E11.40 TYPE 2 DIABETES MELLITUS WITH DIABETIC NEUROPATHY, UNSPECIFIED: ICD-10-CM

## 2025-02-05 DIAGNOSIS — Z85.3 PERSONAL HISTORY OF MALIGNANT NEOPLASM OF BREAST: ICD-10-CM

## 2025-02-05 DIAGNOSIS — E87.21 ACUTE METABOLIC ACIDOSIS: ICD-10-CM

## 2025-02-05 DIAGNOSIS — R74.01 ELEVATION OF LEVELS OF LIVER TRANSAMINASE LEVELS: ICD-10-CM

## 2025-02-05 DIAGNOSIS — F05 DELIRIUM DUE TO KNOWN PHYSIOLOGICAL CONDITION: ICD-10-CM

## 2025-02-05 DIAGNOSIS — E78.5 HYPERLIPIDEMIA, UNSPECIFIED: ICD-10-CM

## 2025-02-05 DIAGNOSIS — E83.42 HYPOMAGNESEMIA: ICD-10-CM

## 2025-02-05 DIAGNOSIS — Z79.84 LONG TERM (CURRENT) USE OF ORAL HYPOGLYCEMIC DRUGS: ICD-10-CM

## 2025-02-05 DIAGNOSIS — Z90.11 ACQUIRED ABSENCE OF RIGHT BREAST AND NIPPLE: ICD-10-CM

## 2025-02-05 DIAGNOSIS — Z79.02 LONG TERM (CURRENT) USE OF ANTITHROMBOTICS/ANTIPLATELETS: ICD-10-CM

## 2025-02-05 DIAGNOSIS — I10 ESSENTIAL (PRIMARY) HYPERTENSION: ICD-10-CM

## 2025-02-05 DIAGNOSIS — R23.4 CHANGES IN SKIN TEXTURE: ICD-10-CM

## 2025-02-05 DIAGNOSIS — Z88.5 ALLERGY STATUS TO NARCOTIC AGENT: ICD-10-CM

## 2025-02-05 DIAGNOSIS — I69.398 OTHER SEQUELAE OF CEREBRAL INFARCTION: ICD-10-CM

## 2025-03-10 ENCOUNTER — APPOINTMENT (OUTPATIENT)
Dept: UROLOGY | Facility: CLINIC | Age: 89
End: 2025-03-10

## 2025-04-04 ENCOUNTER — APPOINTMENT (OUTPATIENT)
Dept: UROLOGY | Facility: CLINIC | Age: 89
End: 2025-04-04

## 2025-04-04 VITALS
OXYGEN SATURATION: 94 % | HEIGHT: 62 IN | RESPIRATION RATE: 18 BRPM | WEIGHT: 115 LBS | HEART RATE: 90 BPM | DIASTOLIC BLOOD PRESSURE: 65 MMHG | BODY MASS INDEX: 21.16 KG/M2 | SYSTOLIC BLOOD PRESSURE: 111 MMHG

## 2025-04-04 DIAGNOSIS — Z86.79 PERSONAL HISTORY OF OTHER DISEASES OF THE CIRCULATORY SYSTEM: ICD-10-CM

## 2025-04-04 DIAGNOSIS — G45.9 TRANSIENT CEREBRAL ISCHEMIC ATTACK, UNSPECIFIED: ICD-10-CM

## 2025-04-04 DIAGNOSIS — Z86.73 PERSONAL HISTORY OF TRANSIENT ISCHEMIC ATTACK (TIA), AND CEREBRAL INFARCTION W/OUT RESIDUAL DEFICITS: ICD-10-CM

## 2025-04-04 DIAGNOSIS — Z86.39 PERSONAL HISTORY OF OTHER ENDOCRINE, NUTRITIONAL AND METABOLIC DISEASE: ICD-10-CM

## 2025-04-04 DIAGNOSIS — R33.9 RETENTION OF URINE, UNSPECIFIED: ICD-10-CM

## 2025-04-04 PROCEDURE — 99204 OFFICE O/P NEW MOD 45 MIN: CPT

## 2025-04-04 PROCEDURE — G2211 COMPLEX E/M VISIT ADD ON: CPT

## 2025-04-09 ENCOUNTER — NON-APPOINTMENT (OUTPATIENT)
Age: 89
End: 2025-04-09

## 2025-04-10 ENCOUNTER — NON-APPOINTMENT (OUTPATIENT)
Age: 89
End: 2025-04-10

## 2025-04-11 PROBLEM — G45.9 TRANSIENT CEREBRAL ISCHEMIC ATTACK: Status: ACTIVE | Noted: 2023-06-16

## 2025-04-11 LAB — URINE CULTURE <10: ABNORMAL

## 2025-04-23 ENCOUNTER — APPOINTMENT (OUTPATIENT)
Dept: NEUROLOGY | Facility: CLINIC | Age: 89
End: 2025-04-23

## 2025-04-28 NOTE — OCCUPATIONAL THERAPY INITIAL EVALUATION ADULT - FINE MOTOR COORDINATION, RIGHT HAND THUMB/FINGER OPPOSITION SKILLS, OT EVAL
-- DO NOT REPLY / DO NOT REPLY ALL --  -- This inbox is not monitored. If this was sent to the wrong provider or department, reroute message to P ECO Reroute pool. --  -- Message is from Engagement Center Operations (ECO) --    Request Result      Which Result are your requesting?  Sleep Study    What is the full name of the provider that ordered the lab or test[facility]    Caller Information       Contact Date/Time Type Contact Phone/Fax    04/28/2025 01:45 PM CDT Phone (Incoming) Wilbert Christian FRANKY (Mother) [phone] (M)            Alternative phone number: n/a    Can a detailed message be left?: Yes - Voicemail     Patient has been advised the message will be addressed within 2-3 business days.      Copied from CRM #[id number]. Topic: MW Clinical Concerns - MW Medical Question  >> Apr 28, 2025  1:48 PM Ghada LANDAVERDE wrote:  Christian called to check if results are available:    Patient does not have [employer].Does not want to access [employer] for results; Selected 'Wrap Up CRM' and created new Telephone Encounter after clicking 'Convert to Clinical Call'. Selected Reason for Call 'Results'. Sent Result Request template and routed as routine priority per Clinician KB page to appropriate clinician pool. Readback full message.   normal performance

## 2025-04-29 ENCOUNTER — APPOINTMENT (OUTPATIENT)
Dept: UROLOGY | Facility: CLINIC | Age: 89
End: 2025-04-29

## 2025-04-29 PROCEDURE — 51784 ANAL/URINARY MUSCLE STUDY: CPT

## 2025-04-29 PROCEDURE — 51726 COMPLEX CYSTOMETROGRAM: CPT

## 2025-06-09 NOTE — ED ADULT TRIAGE NOTE - TEMPERATURE IN CELSIUS (DEGREES C)
Vimal Chavez Patient Age: 59 year old  MESSAGE:   Patient called. Stated she has left ear pain possible and infection. Started 6/4/25. Started with itchiness, progressed to ear pain. Took allergy medicine and was off balance due to left ear pain. Would like sooner appointment than next available 8/2025. Please advise.      WEIGHT AND HEIGHT:   Wt Readings from Last 1 Encounters:   02/01/25 98.8 kg (217 lb 13 oz)     Ht Readings from Last 1 Encounters:   02/01/25 4' 11\" (1.499 m)     BMI Readings from Last 1 Encounters:   02/01/25 43.99 kg/m²       ALLERGIES:  Codeine, Metformin, and Morphine  Current Outpatient Medications   Medication Sig Dispense Refill    lidocaine (LIDODERM) 5 % patch Place 1 patch onto the skin every 24 hours. Remove patch 12 hours after applying 30 patch 0    ondansetron (ZOFRAN ODT) 8 MG disintegrating tablet Place 1 tablet onto the tongue every 8 hours as needed for Nausea. 12 tablet 0    azelastine (ASTELIN) 0.1 % nasal spray Spray 2 sprays in each nostril in the morning and 2 sprays in the evening. Use in each nostril as directed 90 mL 0    fluticasone (FLONASE) 50 MCG/ACT nasal spray Spray 2 sprays in each nostril daily. 48 g 0    methylPREDNISolone (MEDROL DOSEPAK) 4 MG tablet Take 1 tablet by mouth as directed. follow package directions 21 tablet 0    omeprazole (PriLOSEC) 40 MG capsule Take 1 capsule by mouth daily. 30 capsule 5    dicyclomine (BENTYL) 20 MG tablet Take 1 tablet by mouth every 6 hours as needed (LUQ pain). 120 tablet 0    fexofenadine (ALLEGRA) 180 MG tablet Take 1 tablet by mouth daily. 30 tablet 3    budesonide (RHINOCORT ALLERGY) 32 MCG/ACT nasal spray Spray 2 sprays in each nostril in the morning and 2 sprays in the evening. 8.6 mL 3    meclizine (ANTIVERT) 25 MG tablet       albuterol (VENTOLIN) (2.5 MG/3ML) 0.083% nebulizer solution USE 1 VIAL VIA NEBULIZER EVERY 4 OR 6 HOURS AS NEEDED      aspirin (ECOTRIN) 81 MG EC tablet       cetirizine (ZyrTEC) 10  MG tablet as needed.      clonazePAM (KLONOPIN) 1 MG tablet Take 1 mg by mouth.      Probiotic Product (VSL#3) capsule       Multiple Vitamin (MULTI-VITAMINS) Tab       triamterene-hydroCHLOROthiazide (MAXZIDE-25) 37.5-25 MG per tablet as needed.      Ketoprofen 25 MG Cap Take 25 mg by mouth 4 times daily as needed (pain). 28 capsule 0     No current facility-administered medications for this visit.     [facility]Y [facility]: [id number] - [address] AT [facility] ([address] [address]  Phone: [phone] Fax: [phone]            Pharmacy preference(s) on file:   [facility] #[id number] - [address] AT [facility] ([address] [address]  Phone: [phone] Fax: [phone]    [facility] #[id number] - [place], [place] - [address] AT [facility] [address]  [address]  [facility] [address]  Phone: [phone] Fax: [phone]    [facility] #[id number] - [place], [place] - [address] [facility] [address]  Phone: [phone] Fax: [phone]      CALL BACK INFO: DO NOT LEAVE A MESSAGE - contact patient directly and Ok to respond via [employer]  ROUTING: Patient's physician/staff        PCP: Allen Varela MD         INS: Payor: MEDICARE / Plan: PARTA AND B / Product Type: MEDICARE   PATIENT ADDRESS:  [address] [place] [id number]   36.8

## 2025-06-18 NOTE — ED PROVIDER NOTE - WR ORDER NAME 1
Pt was in for Menveo and PNA 20 IM injection in her left and right deltoid. The pt tolerated the injections well and she had no adverse reactions. The pt was offered VIS information on both injections and she had no other questions at this time.    Xray Chest 1 View-PORTABLE IMMEDIATE

## 2025-07-16 ENCOUNTER — INPATIENT (INPATIENT)
Facility: HOSPITAL | Age: 89
LOS: 4 days | Discharge: NURSING, REHAB OR LONG-TERM CARE | DRG: 72 | End: 2025-07-21
Attending: STUDENT IN AN ORGANIZED HEALTH CARE EDUCATION/TRAINING PROGRAM | Admitting: INTERNAL MEDICINE
Payer: MEDICARE

## 2025-07-16 VITALS
TEMPERATURE: 99 F | HEART RATE: 77 BPM | RESPIRATION RATE: 18 BRPM | WEIGHT: 113.1 LBS | DIASTOLIC BLOOD PRESSURE: 76 MMHG | OXYGEN SATURATION: 96 % | SYSTOLIC BLOOD PRESSURE: 135 MMHG | HEIGHT: 64 IN

## 2025-07-16 DIAGNOSIS — Z90.10 ACQUIRED ABSENCE OF UNSPECIFIED BREAST AND NIPPLE: Chronic | ICD-10-CM

## 2025-07-16 LAB
ALBUMIN SERPL ELPH-MCNC: 4.2 G/DL — SIGNIFICANT CHANGE UP (ref 3.5–5.2)
ALP SERPL-CCNC: 63 U/L — SIGNIFICANT CHANGE UP (ref 30–115)
ALT FLD-CCNC: 18 U/L — SIGNIFICANT CHANGE UP (ref 0–41)
ANION GAP SERPL CALC-SCNC: 14 MMOL/L — SIGNIFICANT CHANGE UP (ref 7–14)
APAP SERPL-MCNC: <5 UG/ML — LOW (ref 10–30)
APPEARANCE UR: ABNORMAL
AST SERPL-CCNC: 34 U/L — SIGNIFICANT CHANGE UP (ref 0–41)
BACTERIA # UR AUTO: ABNORMAL /HPF
BASOPHILS # BLD AUTO: 0.02 K/UL — SIGNIFICANT CHANGE UP (ref 0–0.2)
BASOPHILS NFR BLD AUTO: 0.3 % — SIGNIFICANT CHANGE UP (ref 0–2)
BILIRUB SERPL-MCNC: <0.2 MG/DL — SIGNIFICANT CHANGE UP (ref 0.2–1.2)
BILIRUB UR-MCNC: NEGATIVE — SIGNIFICANT CHANGE UP
BUN SERPL-MCNC: 18 MG/DL — SIGNIFICANT CHANGE UP (ref 10–20)
CALCIUM SERPL-MCNC: 10.2 MG/DL — SIGNIFICANT CHANGE UP (ref 8.4–10.5)
CHLORIDE SERPL-SCNC: 103 MMOL/L — SIGNIFICANT CHANGE UP (ref 98–110)
CO2 SERPL-SCNC: 22 MMOL/L — SIGNIFICANT CHANGE UP (ref 17–32)
COLOR SPEC: YELLOW — SIGNIFICANT CHANGE UP
CREAT SERPL-MCNC: 0.9 MG/DL — SIGNIFICANT CHANGE UP (ref 0.7–1.5)
DIFF PNL FLD: ABNORMAL
EGFR: 61 ML/MIN/1.73M2 — SIGNIFICANT CHANGE UP
EGFR: 61 ML/MIN/1.73M2 — SIGNIFICANT CHANGE UP
EOSINOPHIL # BLD AUTO: 0.09 K/UL — SIGNIFICANT CHANGE UP (ref 0–0.5)
EOSINOPHIL NFR BLD AUTO: 1.2 % — SIGNIFICANT CHANGE UP (ref 0–6)
EPI CELLS # UR: PRESENT
ETHANOL SERPL-MCNC: <10 MG/DL — SIGNIFICANT CHANGE UP
GLUCOSE BLDC GLUCOMTR-MCNC: 137 MG/DL — HIGH (ref 70–99)
GLUCOSE BLDC GLUCOMTR-MCNC: 138 MG/DL — HIGH (ref 70–99)
GLUCOSE BLDC GLUCOMTR-MCNC: 182 MG/DL — HIGH (ref 70–99)
GLUCOSE BLDC GLUCOMTR-MCNC: 65 MG/DL — LOW (ref 70–99)
GLUCOSE SERPL-MCNC: 40 MG/DL — CRITICAL LOW (ref 70–99)
GLUCOSE UR QL: 500 MG/DL
HCT VFR BLD CALC: 30.6 % — LOW (ref 34.5–45)
HGB BLD-MCNC: 9.9 G/DL — LOW (ref 11.5–15.5)
IMM GRANULOCYTES # BLD AUTO: 0.01 K/UL — SIGNIFICANT CHANGE UP (ref 0–0.07)
IMM GRANULOCYTES NFR BLD AUTO: 0.1 % — SIGNIFICANT CHANGE UP (ref 0–0.9)
KETONES UR QL: NEGATIVE MG/DL — SIGNIFICANT CHANGE UP
LACTATE SERPL-SCNC: 1.1 MMOL/L — SIGNIFICANT CHANGE UP (ref 0.7–2)
LEUKOCYTE ESTERASE UR-ACNC: ABNORMAL
LYMPHOCYTES # BLD AUTO: 1.41 K/UL — SIGNIFICANT CHANGE UP (ref 1–3.3)
LYMPHOCYTES NFR BLD AUTO: 18.4 % — SIGNIFICANT CHANGE UP (ref 13–44)
MAGNESIUM SERPL-MCNC: 1.2 MG/DL — LOW (ref 1.8–2.4)
MCHC RBC-ENTMCNC: 27.5 PG — SIGNIFICANT CHANGE UP (ref 27–34)
MCHC RBC-ENTMCNC: 32.4 G/DL — SIGNIFICANT CHANGE UP (ref 32–36)
MCV RBC AUTO: 85 FL — SIGNIFICANT CHANGE UP (ref 80–100)
MONOCYTES # BLD AUTO: 0.49 K/UL — SIGNIFICANT CHANGE UP (ref 0–0.9)
MONOCYTES NFR BLD AUTO: 6.4 % — SIGNIFICANT CHANGE UP (ref 2–14)
MUCOUS THREADS # UR AUTO: PRESENT
NEUTROPHILS # BLD AUTO: 5.66 K/UL — SIGNIFICANT CHANGE UP (ref 1.8–7.4)
NEUTROPHILS NFR BLD AUTO: 73.6 % — SIGNIFICANT CHANGE UP (ref 43–77)
NITRITE UR-MCNC: NEGATIVE — SIGNIFICANT CHANGE UP
NRBC # BLD AUTO: 0 K/UL — SIGNIFICANT CHANGE UP (ref 0–0)
NRBC # FLD: 0 K/UL — SIGNIFICANT CHANGE UP (ref 0–0)
NRBC BLD AUTO-RTO: 0 /100 WBCS — SIGNIFICANT CHANGE UP (ref 0–0)
PH UR: 5.5 — SIGNIFICANT CHANGE UP (ref 5–8)
PLATELET # BLD AUTO: 179 K/UL — SIGNIFICANT CHANGE UP (ref 150–400)
PMV BLD: 8.2 FL — SIGNIFICANT CHANGE UP (ref 7–13)
POTASSIUM SERPL-MCNC: 4.5 MMOL/L — SIGNIFICANT CHANGE UP (ref 3.5–5)
POTASSIUM SERPL-SCNC: 4.5 MMOL/L — SIGNIFICANT CHANGE UP (ref 3.5–5)
PROT SERPL-MCNC: 6.6 G/DL — SIGNIFICANT CHANGE UP (ref 6–8)
PROT UR-MCNC: 30 MG/DL
RBC # BLD: 3.6 M/UL — LOW (ref 3.8–5.2)
RBC # FLD: 14.5 % — SIGNIFICANT CHANGE UP (ref 10.3–14.5)
RBC CASTS # UR COMP ASSIST: 3 /HPF — SIGNIFICANT CHANGE UP (ref 0–4)
SALICYLATES SERPL-MCNC: <0.3 MG/DL — LOW (ref 4–30)
SODIUM SERPL-SCNC: 139 MMOL/L — SIGNIFICANT CHANGE UP (ref 135–146)
SP GR SPEC: 1.02 — SIGNIFICANT CHANGE UP (ref 1–1.03)
SQUAMOUS # UR AUTO: 2 /HPF — SIGNIFICANT CHANGE UP (ref 0–5)
UROBILINOGEN FLD QL: 0.2 MG/DL — SIGNIFICANT CHANGE UP (ref 0.2–1)
WBC # BLD: 7.68 K/UL — SIGNIFICANT CHANGE UP (ref 3.8–10.5)
WBC # FLD AUTO: 7.68 K/UL — SIGNIFICANT CHANGE UP (ref 3.8–10.5)
WBC UR QL: 12 /HPF — HIGH (ref 0–5)

## 2025-07-16 PROCEDURE — 71045 X-RAY EXAM CHEST 1 VIEW: CPT | Mod: 26

## 2025-07-16 PROCEDURE — 99285 EMERGENCY DEPT VISIT HI MDM: CPT | Mod: FS

## 2025-07-16 PROCEDURE — 76857 US EXAM PELVIC LIMITED: CPT | Mod: 26

## 2025-07-16 PROCEDURE — 74177 CT ABD & PELVIS W/CONTRAST: CPT | Mod: 26

## 2025-07-16 RX ORDER — MAGNESIUM SULFATE 500 MG/ML
2 SYRINGE (ML) INJECTION ONCE
Refills: 0 | Status: COMPLETED | OUTPATIENT
Start: 2025-07-16 | End: 2025-07-16

## 2025-07-16 RX ORDER — DEXTROSE 50 % IN WATER 50 %
50 SYRINGE (ML) INTRAVENOUS ONCE
Refills: 0 | Status: COMPLETED | OUTPATIENT
Start: 2025-07-16 | End: 2025-07-16

## 2025-07-16 RX ORDER — CEFTRIAXONE 500 MG/1
2000 INJECTION, POWDER, FOR SOLUTION INTRAMUSCULAR; INTRAVENOUS ONCE
Refills: 0 | Status: COMPLETED | OUTPATIENT
Start: 2025-07-16 | End: 2025-07-16

## 2025-07-16 RX ADMIN — CEFTRIAXONE 100 MILLIGRAM(S): 500 INJECTION, POWDER, FOR SOLUTION INTRAMUSCULAR; INTRAVENOUS at 19:04

## 2025-07-16 RX ADMIN — Medication 50 MILLILITER(S): at 16:46

## 2025-07-16 RX ADMIN — Medication 25 GRAM(S): at 16:46

## 2025-07-17 DIAGNOSIS — Z71.89 OTHER SPECIFIED COUNSELING: ICD-10-CM

## 2025-07-17 DIAGNOSIS — Z51.5 ENCOUNTER FOR PALLIATIVE CARE: ICD-10-CM

## 2025-07-17 DIAGNOSIS — G93.41 METABOLIC ENCEPHALOPATHY: ICD-10-CM

## 2025-07-17 DIAGNOSIS — N39.0 URINARY TRACT INFECTION, SITE NOT SPECIFIED: ICD-10-CM

## 2025-07-17 LAB
A1C WITH ESTIMATED AVERAGE GLUCOSE RESULT: 5.7 % — HIGH (ref 4–5.6)
ANION GAP SERPL CALC-SCNC: 11 MMOL/L — SIGNIFICANT CHANGE UP (ref 7–14)
BUN SERPL-MCNC: 16 MG/DL — SIGNIFICANT CHANGE UP (ref 10–20)
CALCIUM SERPL-MCNC: 9.4 MG/DL — SIGNIFICANT CHANGE UP (ref 8.4–10.5)
CHLORIDE SERPL-SCNC: 106 MMOL/L — SIGNIFICANT CHANGE UP (ref 98–110)
CO2 SERPL-SCNC: 24 MMOL/L — SIGNIFICANT CHANGE UP (ref 17–32)
CREAT SERPL-MCNC: 0.7 MG/DL — SIGNIFICANT CHANGE UP (ref 0.7–1.5)
EGFR: 82 ML/MIN/1.73M2 — SIGNIFICANT CHANGE UP
EGFR: 82 ML/MIN/1.73M2 — SIGNIFICANT CHANGE UP
ESTIMATED AVERAGE GLUCOSE: 117 MG/DL — HIGH (ref 68–114)
GLUCOSE BLDC GLUCOMTR-MCNC: 118 MG/DL — HIGH (ref 70–99)
GLUCOSE BLDC GLUCOMTR-MCNC: 144 MG/DL — HIGH (ref 70–99)
GLUCOSE SERPL-MCNC: 130 MG/DL — HIGH (ref 70–99)
HCT VFR BLD CALC: 30.9 % — LOW (ref 34.5–45)
HGB BLD-MCNC: 10.2 G/DL — LOW (ref 11.5–15.5)
MCHC RBC-ENTMCNC: 28 PG — SIGNIFICANT CHANGE UP (ref 27–34)
MCHC RBC-ENTMCNC: 33 G/DL — SIGNIFICANT CHANGE UP (ref 32–36)
MCV RBC AUTO: 84.9 FL — SIGNIFICANT CHANGE UP (ref 80–100)
NRBC # BLD AUTO: 0 K/UL — SIGNIFICANT CHANGE UP (ref 0–0)
NRBC # FLD: 0 K/UL — SIGNIFICANT CHANGE UP (ref 0–0)
NRBC BLD AUTO-RTO: 0 /100 WBCS — SIGNIFICANT CHANGE UP (ref 0–0)
PLATELET # BLD AUTO: 175 K/UL — SIGNIFICANT CHANGE UP (ref 150–400)
PMV BLD: 8.6 FL — SIGNIFICANT CHANGE UP (ref 7–13)
POTASSIUM SERPL-MCNC: 4.2 MMOL/L — SIGNIFICANT CHANGE UP (ref 3.5–5)
POTASSIUM SERPL-SCNC: 4.2 MMOL/L — SIGNIFICANT CHANGE UP (ref 3.5–5)
RBC # BLD: 3.64 M/UL — LOW (ref 3.8–5.2)
RBC # FLD: 14.5 % — SIGNIFICANT CHANGE UP (ref 10.3–14.5)
SODIUM SERPL-SCNC: 141 MMOL/L — SIGNIFICANT CHANGE UP (ref 135–146)
WBC # BLD: 5.51 K/UL — SIGNIFICANT CHANGE UP (ref 3.8–10.5)
WBC # FLD AUTO: 5.51 K/UL — SIGNIFICANT CHANGE UP (ref 3.8–10.5)

## 2025-07-17 PROCEDURE — 93970 EXTREMITY STUDY: CPT | Mod: 26

## 2025-07-17 PROCEDURE — 70450 CT HEAD/BRAIN W/O DYE: CPT | Mod: 26

## 2025-07-17 PROCEDURE — 85027 COMPLETE CBC AUTOMATED: CPT

## 2025-07-17 PROCEDURE — 83036 HEMOGLOBIN GLYCOSYLATED A1C: CPT

## 2025-07-17 PROCEDURE — 80048 BASIC METABOLIC PNL TOTAL CA: CPT

## 2025-07-17 PROCEDURE — 82962 GLUCOSE BLOOD TEST: CPT

## 2025-07-17 PROCEDURE — 99497 ADVNCD CARE PLAN 30 MIN: CPT | Mod: 25

## 2025-07-17 PROCEDURE — 70450 CT HEAD/BRAIN W/O DYE: CPT

## 2025-07-17 PROCEDURE — 99223 1ST HOSP IP/OBS HIGH 75: CPT

## 2025-07-17 PROCEDURE — 99222 1ST HOSP IP/OBS MODERATE 55: CPT

## 2025-07-17 PROCEDURE — 97110 THERAPEUTIC EXERCISES: CPT | Mod: GP

## 2025-07-17 PROCEDURE — 93970 EXTREMITY STUDY: CPT

## 2025-07-17 PROCEDURE — 97116 GAIT TRAINING THERAPY: CPT | Mod: GP

## 2025-07-17 PROCEDURE — 97162 PT EVAL MOD COMPLEX 30 MIN: CPT | Mod: GP

## 2025-07-17 PROCEDURE — 36415 COLL VENOUS BLD VENIPUNCTURE: CPT

## 2025-07-17 RX ORDER — GLUCAGON 3 MG/1
1 POWDER NASAL ONCE
Refills: 0 | Status: DISCONTINUED | OUTPATIENT
Start: 2025-07-17 | End: 2025-07-21

## 2025-07-17 RX ORDER — DEXTROSE 50 % IN WATER 50 %
25 SYRINGE (ML) INTRAVENOUS ONCE
Refills: 0 | Status: DISCONTINUED | OUTPATIENT
Start: 2025-07-17 | End: 2025-07-21

## 2025-07-17 RX ORDER — SODIUM CHLORIDE 9 G/1000ML
1000 INJECTION, SOLUTION INTRAVENOUS
Refills: 0 | Status: DISCONTINUED | OUTPATIENT
Start: 2025-07-17 | End: 2025-07-17

## 2025-07-17 RX ORDER — HEPARIN SODIUM 1000 [USP'U]/ML
5000 INJECTION INTRAVENOUS; SUBCUTANEOUS EVERY 12 HOURS
Refills: 0 | Status: DISCONTINUED | OUTPATIENT
Start: 2025-07-17 | End: 2025-07-21

## 2025-07-17 RX ORDER — ASPIRIN 325 MG
81 TABLET ORAL DAILY
Refills: 0 | Status: DISCONTINUED | OUTPATIENT
Start: 2025-07-17 | End: 2025-07-21

## 2025-07-17 RX ORDER — INSULIN LISPRO 100 U/ML
INJECTION, SOLUTION INTRAVENOUS; SUBCUTANEOUS
Refills: 0 | Status: DISCONTINUED | OUTPATIENT
Start: 2025-07-17 | End: 2025-07-21

## 2025-07-17 RX ORDER — NIFEDIPINE 30 MG
30 TABLET, EXTENDED RELEASE 24 HR ORAL
Refills: 0 | Status: DISCONTINUED | OUTPATIENT
Start: 2025-07-17 | End: 2025-07-17

## 2025-07-17 RX ORDER — POLYETHYLENE GLYCOL 3350 17 G/17G
17 POWDER, FOR SOLUTION ORAL DAILY
Refills: 0 | Status: DISCONTINUED | OUTPATIENT
Start: 2025-07-17 | End: 2025-07-21

## 2025-07-17 RX ORDER — MAGNESIUM, ALUMINUM HYDROXIDE 200-200 MG
30 TABLET,CHEWABLE ORAL EVERY 4 HOURS
Refills: 0 | Status: DISCONTINUED | OUTPATIENT
Start: 2025-07-17 | End: 2025-07-21

## 2025-07-17 RX ORDER — SENNA 187 MG
2 TABLET ORAL ONCE
Refills: 0 | Status: DISCONTINUED | OUTPATIENT
Start: 2025-07-17 | End: 2025-07-21

## 2025-07-17 RX ORDER — ATORVASTATIN CALCIUM 80 MG/1
40 TABLET, FILM COATED ORAL AT BEDTIME
Refills: 0 | Status: DISCONTINUED | OUTPATIENT
Start: 2025-07-17 | End: 2025-07-21

## 2025-07-17 RX ORDER — SENNA 187 MG
2 TABLET ORAL AT BEDTIME
Refills: 0 | Status: DISCONTINUED | OUTPATIENT
Start: 2025-07-17 | End: 2025-07-21

## 2025-07-17 RX ORDER — DEXTROSE 50 % IN WATER 50 %
15 SYRINGE (ML) INTRAVENOUS ONCE
Refills: 0 | Status: DISCONTINUED | OUTPATIENT
Start: 2025-07-17 | End: 2025-07-21

## 2025-07-17 RX ORDER — ONDANSETRON HCL/PF 4 MG/2 ML
4 VIAL (ML) INJECTION EVERY 6 HOURS
Refills: 0 | Status: DISCONTINUED | OUTPATIENT
Start: 2025-07-17 | End: 2025-07-21

## 2025-07-17 RX ORDER — NIFEDIPINE 30 MG
30 TABLET, EXTENDED RELEASE 24 HR ORAL DAILY
Refills: 0 | Status: DISCONTINUED | OUTPATIENT
Start: 2025-07-17 | End: 2025-07-21

## 2025-07-17 RX ORDER — CEFTRIAXONE 500 MG/1
1000 INJECTION, POWDER, FOR SOLUTION INTRAMUSCULAR; INTRAVENOUS EVERY 24 HOURS
Refills: 0 | Status: DISCONTINUED | OUTPATIENT
Start: 2025-07-17 | End: 2025-07-19

## 2025-07-17 RX ORDER — LISINOPRIL 5 MG/1
5 TABLET ORAL DAILY
Refills: 0 | Status: DISCONTINUED | OUTPATIENT
Start: 2025-07-17 | End: 2025-07-20

## 2025-07-17 RX ORDER — MECLIZINE HCL 12.5 MG
25 TABLET ORAL THREE TIMES A DAY
Refills: 0 | Status: DISCONTINUED | OUTPATIENT
Start: 2025-07-17 | End: 2025-07-21

## 2025-07-17 RX ORDER — DEXTROSE 50 % IN WATER 50 %
12.5 SYRINGE (ML) INTRAVENOUS ONCE
Refills: 0 | Status: DISCONTINUED | OUTPATIENT
Start: 2025-07-17 | End: 2025-07-21

## 2025-07-17 RX ORDER — MELATONIN 5 MG
3 TABLET ORAL AT BEDTIME
Refills: 0 | Status: DISCONTINUED | OUTPATIENT
Start: 2025-07-17 | End: 2025-07-21

## 2025-07-17 RX ORDER — GABAPENTIN 400 MG/1
100 CAPSULE ORAL THREE TIMES A DAY
Refills: 0 | Status: DISCONTINUED | OUTPATIENT
Start: 2025-07-17 | End: 2025-07-21

## 2025-07-17 RX ORDER — CLOPIDOGREL BISULFATE 75 MG/1
75 TABLET, FILM COATED ORAL DAILY
Refills: 0 | Status: DISCONTINUED | OUTPATIENT
Start: 2025-07-17 | End: 2025-07-21

## 2025-07-17 RX ORDER — ACETAMINOPHEN 500 MG/5ML
650 LIQUID (ML) ORAL EVERY 6 HOURS
Refills: 0 | Status: DISCONTINUED | OUTPATIENT
Start: 2025-07-17 | End: 2025-07-21

## 2025-07-17 RX ADMIN — Medication 2 TABLET(S): at 21:25

## 2025-07-17 RX ADMIN — CEFTRIAXONE 100 MILLIGRAM(S): 500 INJECTION, POWDER, FOR SOLUTION INTRAMUSCULAR; INTRAVENOUS at 20:50

## 2025-07-17 RX ADMIN — Medication 81 MILLIGRAM(S): at 12:29

## 2025-07-17 RX ADMIN — Medication 30 MILLIGRAM(S): at 06:19

## 2025-07-17 RX ADMIN — Medication 1 APPLICATION(S): at 06:19

## 2025-07-17 RX ADMIN — GABAPENTIN 100 MILLIGRAM(S): 400 CAPSULE ORAL at 06:19

## 2025-07-17 RX ADMIN — INSULIN LISPRO 12: 100 INJECTION, SOLUTION INTRAVENOUS; SUBCUTANEOUS at 20:29

## 2025-07-17 RX ADMIN — CLOPIDOGREL BISULFATE 75 MILLIGRAM(S): 75 TABLET, FILM COATED ORAL at 12:28

## 2025-07-17 RX ADMIN — SODIUM CHLORIDE 75 MILLILITER(S): 9 INJECTION, SOLUTION INTRAVENOUS at 02:05

## 2025-07-17 RX ADMIN — HEPARIN SODIUM 5000 UNIT(S): 1000 INJECTION INTRAVENOUS; SUBCUTANEOUS at 17:30

## 2025-07-17 RX ADMIN — GABAPENTIN 100 MILLIGRAM(S): 400 CAPSULE ORAL at 21:24

## 2025-07-17 RX ADMIN — GABAPENTIN 100 MILLIGRAM(S): 400 CAPSULE ORAL at 13:21

## 2025-07-17 RX ADMIN — HEPARIN SODIUM 5000 UNIT(S): 1000 INJECTION INTRAVENOUS; SUBCUTANEOUS at 06:18

## 2025-07-17 RX ADMIN — INSULIN LISPRO 4: 100 INJECTION, SOLUTION INTRAVENOUS; SUBCUTANEOUS at 12:25

## 2025-07-17 RX ADMIN — ATORVASTATIN CALCIUM 40 MILLIGRAM(S): 80 TABLET, FILM COATED ORAL at 21:25

## 2025-07-17 RX ADMIN — LISINOPRIL 5 MILLIGRAM(S): 5 TABLET ORAL at 06:19

## 2025-07-17 RX ADMIN — POLYETHYLENE GLYCOL 3350 17 GRAM(S): 17 POWDER, FOR SOLUTION ORAL at 12:29

## 2025-07-18 PROCEDURE — 99232 SBSQ HOSP IP/OBS MODERATE 35: CPT

## 2025-07-18 RX ORDER — SODIUM CHLORIDE 9 G/1000ML
1000 INJECTION, SOLUTION INTRAVENOUS
Refills: 0 | Status: DISCONTINUED | OUTPATIENT
Start: 2025-07-18 | End: 2025-07-21

## 2025-07-18 RX ORDER — INSULIN GLARGINE-YFGN 100 [IU]/ML
5 INJECTION, SOLUTION SUBCUTANEOUS AT BEDTIME
Refills: 0 | Status: DISCONTINUED | OUTPATIENT
Start: 2025-07-18 | End: 2025-07-21

## 2025-07-18 RX ORDER — INSULIN LISPRO 100 U/ML
10 INJECTION, SOLUTION INTRAVENOUS; SUBCUTANEOUS ONCE
Refills: 0 | Status: COMPLETED | OUTPATIENT
Start: 2025-07-18 | End: 2025-07-18

## 2025-07-18 RX ADMIN — ATORVASTATIN CALCIUM 40 MILLIGRAM(S): 80 TABLET, FILM COATED ORAL at 21:43

## 2025-07-18 RX ADMIN — INSULIN LISPRO 8: 100 INJECTION, SOLUTION INTRAVENOUS; SUBCUTANEOUS at 11:43

## 2025-07-18 RX ADMIN — Medication 81 MILLIGRAM(S): at 11:43

## 2025-07-18 RX ADMIN — Medication 650 MILLIGRAM(S): at 13:26

## 2025-07-18 RX ADMIN — Medication 2 TABLET(S): at 21:43

## 2025-07-18 RX ADMIN — CEFTRIAXONE 100 MILLIGRAM(S): 500 INJECTION, POWDER, FOR SOLUTION INTRAMUSCULAR; INTRAVENOUS at 21:43

## 2025-07-18 RX ADMIN — Medication 650 MILLIGRAM(S): at 23:01

## 2025-07-18 RX ADMIN — INSULIN LISPRO 6: 100 INJECTION, SOLUTION INTRAVENOUS; SUBCUTANEOUS at 17:21

## 2025-07-18 RX ADMIN — HEPARIN SODIUM 5000 UNIT(S): 1000 INJECTION INTRAVENOUS; SUBCUTANEOUS at 17:23

## 2025-07-18 RX ADMIN — INSULIN GLARGINE-YFGN 5 UNIT(S): 100 INJECTION, SOLUTION SUBCUTANEOUS at 21:42

## 2025-07-18 RX ADMIN — INSULIN LISPRO 10 UNIT(S): 100 INJECTION, SOLUTION INTRAVENOUS; SUBCUTANEOUS at 21:42

## 2025-07-18 RX ADMIN — Medication 650 MILLIGRAM(S): at 22:55

## 2025-07-18 RX ADMIN — Medication 25 MILLIGRAM(S): at 13:37

## 2025-07-18 RX ADMIN — GABAPENTIN 100 MILLIGRAM(S): 400 CAPSULE ORAL at 13:27

## 2025-07-18 RX ADMIN — CLOPIDOGREL BISULFATE 75 MILLIGRAM(S): 75 TABLET, FILM COATED ORAL at 11:44

## 2025-07-18 RX ADMIN — GABAPENTIN 100 MILLIGRAM(S): 400 CAPSULE ORAL at 21:43

## 2025-07-18 RX ADMIN — Medication 650 MILLIGRAM(S): at 13:56

## 2025-07-19 ENCOUNTER — TRANSCRIPTION ENCOUNTER (OUTPATIENT)
Age: 89
End: 2025-07-19

## 2025-07-19 LAB
-  AMOXICILLIN/CLAVULANIC ACID: SIGNIFICANT CHANGE UP
-  AMOXICILLIN/CLAVULANIC ACID: SIGNIFICANT CHANGE UP
-  AMPICILLIN/SULBACTAM: SIGNIFICANT CHANGE UP
-  AMPICILLIN/SULBACTAM: SIGNIFICANT CHANGE UP
-  AMPICILLIN: SIGNIFICANT CHANGE UP
-  AMPICILLIN: SIGNIFICANT CHANGE UP
-  AZTREONAM: SIGNIFICANT CHANGE UP
-  AZTREONAM: SIGNIFICANT CHANGE UP
-  CEFAZOLIN: SIGNIFICANT CHANGE UP
-  CEFAZOLIN: SIGNIFICANT CHANGE UP
-  CEFEPIME: SIGNIFICANT CHANGE UP
-  CEFEPIME: SIGNIFICANT CHANGE UP
-  CEFOXITIN: SIGNIFICANT CHANGE UP
-  CEFOXITIN: SIGNIFICANT CHANGE UP
-  CEFTRIAXONE: SIGNIFICANT CHANGE UP
-  CEFTRIAXONE: SIGNIFICANT CHANGE UP
-  CEFUROXIME: SIGNIFICANT CHANGE UP
-  CEFUROXIME: SIGNIFICANT CHANGE UP
-  CIPROFLOXACIN: SIGNIFICANT CHANGE UP
-  CIPROFLOXACIN: SIGNIFICANT CHANGE UP
-  ERTAPENEM: SIGNIFICANT CHANGE UP
-  ERTAPENEM: SIGNIFICANT CHANGE UP
-  GENTAMICIN: SIGNIFICANT CHANGE UP
-  GENTAMICIN: SIGNIFICANT CHANGE UP
-  IMIPENEM: SIGNIFICANT CHANGE UP
-  IMIPENEM: SIGNIFICANT CHANGE UP
-  LEVOFLOXACIN: SIGNIFICANT CHANGE UP
-  LEVOFLOXACIN: SIGNIFICANT CHANGE UP
-  MEROPENEM: SIGNIFICANT CHANGE UP
-  MEROPENEM: SIGNIFICANT CHANGE UP
-  NITROFURANTOIN: SIGNIFICANT CHANGE UP
-  NITROFURANTOIN: SIGNIFICANT CHANGE UP
-  PIPERACILLIN/TAZOBACTAM: SIGNIFICANT CHANGE UP
-  PIPERACILLIN/TAZOBACTAM: SIGNIFICANT CHANGE UP
-  TIGECYCLINE: SIGNIFICANT CHANGE UP
-  TIGECYCLINE: SIGNIFICANT CHANGE UP
-  TOBRAMYCIN: SIGNIFICANT CHANGE UP
-  TOBRAMYCIN: SIGNIFICANT CHANGE UP
-  TRIMETHOPRIM/SULFAMETHOXAZOLE: SIGNIFICANT CHANGE UP
-  TRIMETHOPRIM/SULFAMETHOXAZOLE: SIGNIFICANT CHANGE UP
CULTURE RESULTS: ABNORMAL
METHOD TYPE: SIGNIFICANT CHANGE UP
METHOD TYPE: SIGNIFICANT CHANGE UP
ORGANISM # SPEC MICROSCOPIC CNT: ABNORMAL
ORGANISM # SPEC MICROSCOPIC CNT: ABNORMAL
ORGANISM # SPEC MICROSCOPIC CNT: SIGNIFICANT CHANGE UP
SPECIMEN SOURCE: SIGNIFICANT CHANGE UP

## 2025-07-19 PROCEDURE — 99232 SBSQ HOSP IP/OBS MODERATE 35: CPT

## 2025-07-19 RX ORDER — CEFPODOXIME PROXETIL 200 MG/1
100 TABLET, FILM COATED ORAL EVERY 12 HOURS
Refills: 0 | Status: DISCONTINUED | OUTPATIENT
Start: 2025-07-19 | End: 2025-07-21

## 2025-07-19 RX ADMIN — Medication 650 MILLIGRAM(S): at 22:28

## 2025-07-19 RX ADMIN — CEFPODOXIME PROXETIL 100 MILLIGRAM(S): 200 TABLET, FILM COATED ORAL at 18:57

## 2025-07-19 RX ADMIN — INSULIN LISPRO 6: 100 INJECTION, SOLUTION INTRAVENOUS; SUBCUTANEOUS at 17:26

## 2025-07-19 RX ADMIN — INSULIN LISPRO 4: 100 INJECTION, SOLUTION INTRAVENOUS; SUBCUTANEOUS at 12:20

## 2025-07-19 RX ADMIN — LISINOPRIL 5 MILLIGRAM(S): 5 TABLET ORAL at 05:45

## 2025-07-19 RX ADMIN — CLOPIDOGREL BISULFATE 75 MILLIGRAM(S): 75 TABLET, FILM COATED ORAL at 12:20

## 2025-07-19 RX ADMIN — POLYETHYLENE GLYCOL 3350 17 GRAM(S): 17 POWDER, FOR SOLUTION ORAL at 12:23

## 2025-07-19 RX ADMIN — Medication 30 MILLIGRAM(S): at 05:45

## 2025-07-19 RX ADMIN — Medication 2 TABLET(S): at 21:32

## 2025-07-19 RX ADMIN — Medication 650 MILLIGRAM(S): at 12:19

## 2025-07-19 RX ADMIN — GABAPENTIN 100 MILLIGRAM(S): 400 CAPSULE ORAL at 05:45

## 2025-07-19 RX ADMIN — ATORVASTATIN CALCIUM 40 MILLIGRAM(S): 80 TABLET, FILM COATED ORAL at 21:31

## 2025-07-19 RX ADMIN — HEPARIN SODIUM 5000 UNIT(S): 1000 INJECTION INTRAVENOUS; SUBCUTANEOUS at 05:45

## 2025-07-19 RX ADMIN — Medication 81 MILLIGRAM(S): at 12:20

## 2025-07-19 RX ADMIN — HEPARIN SODIUM 5000 UNIT(S): 1000 INJECTION INTRAVENOUS; SUBCUTANEOUS at 17:25

## 2025-07-19 RX ADMIN — INSULIN GLARGINE-YFGN 5 UNIT(S): 100 INJECTION, SOLUTION SUBCUTANEOUS at 21:31

## 2025-07-19 RX ADMIN — GABAPENTIN 100 MILLIGRAM(S): 400 CAPSULE ORAL at 13:32

## 2025-07-19 RX ADMIN — Medication 650 MILLIGRAM(S): at 21:41

## 2025-07-19 RX ADMIN — GABAPENTIN 100 MILLIGRAM(S): 400 CAPSULE ORAL at 21:31

## 2025-07-19 RX ADMIN — Medication 650 MILLIGRAM(S): at 08:53

## 2025-07-20 PROCEDURE — 99232 SBSQ HOSP IP/OBS MODERATE 35: CPT

## 2025-07-20 RX ADMIN — INSULIN GLARGINE-YFGN 5 UNIT(S): 100 INJECTION, SOLUTION SUBCUTANEOUS at 22:13

## 2025-07-20 RX ADMIN — Medication 650 MILLIGRAM(S): at 23:54

## 2025-07-20 RX ADMIN — ATORVASTATIN CALCIUM 40 MILLIGRAM(S): 80 TABLET, FILM COATED ORAL at 22:12

## 2025-07-20 RX ADMIN — Medication 650 MILLIGRAM(S): at 11:07

## 2025-07-20 RX ADMIN — POLYETHYLENE GLYCOL 3350 17 GRAM(S): 17 POWDER, FOR SOLUTION ORAL at 11:58

## 2025-07-20 RX ADMIN — GABAPENTIN 100 MILLIGRAM(S): 400 CAPSULE ORAL at 13:10

## 2025-07-20 RX ADMIN — INSULIN LISPRO 2: 100 INJECTION, SOLUTION INTRAVENOUS; SUBCUTANEOUS at 08:35

## 2025-07-20 RX ADMIN — CEFPODOXIME PROXETIL 100 MILLIGRAM(S): 200 TABLET, FILM COATED ORAL at 17:18

## 2025-07-20 RX ADMIN — GABAPENTIN 100 MILLIGRAM(S): 400 CAPSULE ORAL at 05:27

## 2025-07-20 RX ADMIN — Medication 81 MILLIGRAM(S): at 11:56

## 2025-07-20 RX ADMIN — GABAPENTIN 100 MILLIGRAM(S): 400 CAPSULE ORAL at 22:12

## 2025-07-20 RX ADMIN — INSULIN LISPRO 2: 100 INJECTION, SOLUTION INTRAVENOUS; SUBCUTANEOUS at 11:55

## 2025-07-20 RX ADMIN — Medication 650 MILLIGRAM(S): at 10:53

## 2025-07-20 RX ADMIN — LISINOPRIL 5 MILLIGRAM(S): 5 TABLET ORAL at 05:27

## 2025-07-20 RX ADMIN — CEFPODOXIME PROXETIL 100 MILLIGRAM(S): 200 TABLET, FILM COATED ORAL at 05:27

## 2025-07-20 RX ADMIN — HEPARIN SODIUM 5000 UNIT(S): 1000 INJECTION INTRAVENOUS; SUBCUTANEOUS at 17:20

## 2025-07-20 RX ADMIN — Medication 650 MILLIGRAM(S): at 22:13

## 2025-07-20 RX ADMIN — HEPARIN SODIUM 5000 UNIT(S): 1000 INJECTION INTRAVENOUS; SUBCUTANEOUS at 05:27

## 2025-07-20 RX ADMIN — INSULIN LISPRO 6: 100 INJECTION, SOLUTION INTRAVENOUS; SUBCUTANEOUS at 17:18

## 2025-07-20 RX ADMIN — CLOPIDOGREL BISULFATE 75 MILLIGRAM(S): 75 TABLET, FILM COATED ORAL at 11:56

## 2025-07-20 RX ADMIN — Medication 1 APPLICATION(S): at 05:27

## 2025-07-21 ENCOUNTER — TRANSCRIPTION ENCOUNTER (OUTPATIENT)
Age: 89
End: 2025-07-21

## 2025-07-21 VITALS
OXYGEN SATURATION: 97 % | RESPIRATION RATE: 18 BRPM | SYSTOLIC BLOOD PRESSURE: 107 MMHG | HEART RATE: 89 BPM | TEMPERATURE: 98 F | DIASTOLIC BLOOD PRESSURE: 65 MMHG

## 2025-07-21 PROCEDURE — 99239 HOSP IP/OBS DSCHRG MGMT >30: CPT

## 2025-07-21 RX ORDER — CEFPODOXIME PROXETIL 200 MG/1
1 TABLET, FILM COATED ORAL
Qty: 0 | Refills: 0 | DISCHARGE
Start: 2025-07-21

## 2025-07-21 RX ADMIN — GABAPENTIN 100 MILLIGRAM(S): 400 CAPSULE ORAL at 05:19

## 2025-07-21 RX ADMIN — HEPARIN SODIUM 5000 UNIT(S): 1000 INJECTION INTRAVENOUS; SUBCUTANEOUS at 05:19

## 2025-07-21 RX ADMIN — CEFPODOXIME PROXETIL 100 MILLIGRAM(S): 200 TABLET, FILM COATED ORAL at 17:18

## 2025-07-21 RX ADMIN — INSULIN LISPRO 8: 100 INJECTION, SOLUTION INTRAVENOUS; SUBCUTANEOUS at 17:15

## 2025-07-21 RX ADMIN — INSULIN LISPRO 2: 100 INJECTION, SOLUTION INTRAVENOUS; SUBCUTANEOUS at 11:44

## 2025-07-21 RX ADMIN — Medication 650 MILLIGRAM(S): at 07:57

## 2025-07-21 RX ADMIN — Medication 30 MILLIGRAM(S): at 05:19

## 2025-07-21 RX ADMIN — CEFPODOXIME PROXETIL 100 MILLIGRAM(S): 200 TABLET, FILM COATED ORAL at 05:19

## 2025-07-21 RX ADMIN — Medication 650 MILLIGRAM(S): at 05:23

## 2025-07-21 RX ADMIN — POLYETHYLENE GLYCOL 3350 17 GRAM(S): 17 POWDER, FOR SOLUTION ORAL at 09:30

## 2025-07-21 RX ADMIN — INSULIN LISPRO 2: 100 INJECTION, SOLUTION INTRAVENOUS; SUBCUTANEOUS at 08:03

## 2025-07-21 RX ADMIN — GABAPENTIN 100 MILLIGRAM(S): 400 CAPSULE ORAL at 13:51

## 2025-07-21 RX ADMIN — Medication 1 APPLICATION(S): at 05:20

## 2025-07-21 RX ADMIN — CLOPIDOGREL BISULFATE 75 MILLIGRAM(S): 75 TABLET, FILM COATED ORAL at 09:30

## 2025-07-21 RX ADMIN — Medication 81 MILLIGRAM(S): at 09:30

## 2025-08-05 DIAGNOSIS — R33.9 RETENTION OF URINE, UNSPECIFIED: ICD-10-CM

## 2025-08-05 DIAGNOSIS — N39.0 URINARY TRACT INFECTION, SITE NOT SPECIFIED: ICD-10-CM

## 2025-08-05 DIAGNOSIS — Z86.73 PERSONAL HISTORY OF TRANSIENT ISCHEMIC ATTACK (TIA), AND CEREBRAL INFARCTION WITHOUT RESIDUAL DEFICITS: ICD-10-CM

## 2025-08-05 DIAGNOSIS — G93.41 METABOLIC ENCEPHALOPATHY: ICD-10-CM

## 2025-08-05 DIAGNOSIS — Z85.3 PERSONAL HISTORY OF MALIGNANT NEOPLASM OF BREAST: ICD-10-CM

## 2025-08-05 DIAGNOSIS — B96.1 KLEBSIELLA PNEUMONIAE [K. PNEUMONIAE] AS THE CAUSE OF DISEASES CLASSIFIED ELSEWHERE: ICD-10-CM

## 2025-08-05 DIAGNOSIS — Z79.82 LONG TERM (CURRENT) USE OF ASPIRIN: ICD-10-CM

## 2025-08-05 DIAGNOSIS — E11.65 TYPE 2 DIABETES MELLITUS WITH HYPERGLYCEMIA: ICD-10-CM

## 2025-08-05 DIAGNOSIS — E83.42 HYPOMAGNESEMIA: ICD-10-CM
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
## 2025-08-05 DIAGNOSIS — T83.511A INFECTION AND INFLAMMATORY REACTION DUE TO INDWELLING URETHRAL CATHETER, INITIAL ENCOUNTER: ICD-10-CM

## 2025-08-05 DIAGNOSIS — E78.5 HYPERLIPIDEMIA, UNSPECIFIED: ICD-10-CM

## 2025-08-05 DIAGNOSIS — I10 ESSENTIAL (PRIMARY) HYPERTENSION: ICD-10-CM

## 2025-08-05 DIAGNOSIS — M71.21 SYNOVIAL CYST OF POPLITEAL SPACE [BAKER], RIGHT KNEE: ICD-10-CM

## 2025-08-05 DIAGNOSIS — Z88.6 ALLERGY STATUS TO ANALGESIC AGENT: ICD-10-CM

## 2025-08-05 DIAGNOSIS — Z79.02 LONG TERM (CURRENT) USE OF ANTITHROMBOTICS/ANTIPLATELETS: ICD-10-CM

## 2025-08-05 DIAGNOSIS — I25.10 ATHEROSCLEROTIC HEART DISEASE OF NATIVE CORONARY ARTERY WITHOUT ANGINA PECTORIS: ICD-10-CM

## 2025-08-05 DIAGNOSIS — E11.649 TYPE 2 DIABETES MELLITUS WITH HYPOGLYCEMIA WITHOUT COMA: ICD-10-CM

## 2025-08-05 DIAGNOSIS — Z79.85 LONG-TERM (CURRENT) USE OF INJECTABLE NON-INSULIN ANTIDIABETIC DRUGS: ICD-10-CM

## 2025-09-15 ENCOUNTER — APPOINTMENT (OUTPATIENT)
Dept: UROLOGY | Facility: CLINIC | Age: 89
End: 2025-09-15
Payer: MEDICARE

## 2025-09-15 DIAGNOSIS — G45.9 TRANSIENT CEREBRAL ISCHEMIC ATTACK, UNSPECIFIED: ICD-10-CM

## 2025-09-15 DIAGNOSIS — R33.9 RETENTION OF URINE, UNSPECIFIED: ICD-10-CM

## 2025-09-15 PROCEDURE — 99214 OFFICE O/P EST MOD 30 MIN: CPT

## 2025-09-15 PROCEDURE — G2211 COMPLEX E/M VISIT ADD ON: CPT
